# Patient Record
Sex: MALE | Race: BLACK OR AFRICAN AMERICAN | NOT HISPANIC OR LATINO | URBAN - METROPOLITAN AREA
[De-identification: names, ages, dates, MRNs, and addresses within clinical notes are randomized per-mention and may not be internally consistent; named-entity substitution may affect disease eponyms.]

---

## 2017-09-28 ENCOUNTER — OUTPATIENT (OUTPATIENT)
Dept: OUTPATIENT SERVICES | Facility: HOSPITAL | Age: 52
LOS: 1 days | Discharge: HOME | End: 2017-09-28
Payer: COMMERCIAL

## 2017-09-28 VITALS
TEMPERATURE: 98 F | SYSTOLIC BLOOD PRESSURE: 106 MMHG | RESPIRATION RATE: 20 BRPM | OXYGEN SATURATION: 100 % | HEART RATE: 89 BPM | WEIGHT: 233.91 LBS | HEIGHT: 72 IN | DIASTOLIC BLOOD PRESSURE: 61 MMHG

## 2017-09-28 DIAGNOSIS — Z98.890 OTHER SPECIFIED POSTPROCEDURAL STATES: Chronic | ICD-10-CM

## 2017-09-28 DIAGNOSIS — Z90.89 ACQUIRED ABSENCE OF OTHER ORGANS: Chronic | ICD-10-CM

## 2017-09-28 DIAGNOSIS — M25.512 PAIN IN LEFT SHOULDER: ICD-10-CM

## 2017-09-28 LAB
ANION GAP SERPL CALC-SCNC: 7 MMOL/L — SIGNIFICANT CHANGE UP (ref 5–17)
APTT BLD: 32.2 SEC — SIGNIFICANT CHANGE UP (ref 27.5–37.4)
BASOPHILS # BLD AUTO: 0.1 K/UL — SIGNIFICANT CHANGE UP (ref 0–0.2)
BASOPHILS NFR BLD AUTO: 1.2 % — SIGNIFICANT CHANGE UP (ref 0–2)
BUN SERPL-MCNC: 11 MG/DL — SIGNIFICANT CHANGE UP (ref 7–23)
CALCIUM SERPL-MCNC: 8.7 MG/DL — SIGNIFICANT CHANGE UP (ref 8.5–10.1)
CHLORIDE SERPL-SCNC: 106 MMOL/L — SIGNIFICANT CHANGE UP (ref 96–108)
CO2 SERPL-SCNC: 26 MMOL/L — SIGNIFICANT CHANGE UP (ref 22–31)
CREAT SERPL-MCNC: 1.29 MG/DL — SIGNIFICANT CHANGE UP (ref 0.5–1.3)
EOSINOPHIL # BLD AUTO: 0.3 K/UL — SIGNIFICANT CHANGE UP (ref 0–0.5)
EOSINOPHIL NFR BLD AUTO: 3.6 % — SIGNIFICANT CHANGE UP (ref 0–6)
GLUCOSE SERPL-MCNC: 104 MG/DL — HIGH (ref 70–99)
HCT VFR BLD CALC: 41.8 % — SIGNIFICANT CHANGE UP (ref 39–50)
HGB BLD-MCNC: 13.8 G/DL — SIGNIFICANT CHANGE UP (ref 13–17)
INR BLD: 1.09 RATIO — SIGNIFICANT CHANGE UP (ref 0.88–1.16)
LYMPHOCYTES # BLD AUTO: 2 K/UL — SIGNIFICANT CHANGE UP (ref 1–3.3)
LYMPHOCYTES # BLD AUTO: 23.2 % — SIGNIFICANT CHANGE UP (ref 13–44)
MCHC RBC-ENTMCNC: 28.3 PG — SIGNIFICANT CHANGE UP (ref 27–34)
MCHC RBC-ENTMCNC: 33 GM/DL — SIGNIFICANT CHANGE UP (ref 32–36)
MCV RBC AUTO: 85.6 FL — SIGNIFICANT CHANGE UP (ref 80–100)
MONOCYTES # BLD AUTO: 0.8 K/UL — SIGNIFICANT CHANGE UP (ref 0–0.9)
MONOCYTES NFR BLD AUTO: 8.6 % — SIGNIFICANT CHANGE UP (ref 2–14)
NEUTROPHILS # BLD AUTO: 5.6 K/UL — SIGNIFICANT CHANGE UP (ref 1.8–7.4)
NEUTROPHILS NFR BLD AUTO: 63.4 % — SIGNIFICANT CHANGE UP (ref 43–77)
PLATELET # BLD AUTO: 237 K/UL — SIGNIFICANT CHANGE UP (ref 150–400)
POTASSIUM SERPL-MCNC: 4 MMOL/L — SIGNIFICANT CHANGE UP (ref 3.5–5.3)
POTASSIUM SERPL-SCNC: 4 MMOL/L — SIGNIFICANT CHANGE UP (ref 3.5–5.3)
PROTHROM AB SERPL-ACNC: 11.8 SEC — SIGNIFICANT CHANGE UP (ref 9.8–12.7)
RBC # BLD: 4.88 M/UL — SIGNIFICANT CHANGE UP (ref 4.2–5.8)
RBC # FLD: 14.8 % — HIGH (ref 10.3–14.5)
SODIUM SERPL-SCNC: 139 MMOL/L — SIGNIFICANT CHANGE UP (ref 135–145)
WBC # BLD: 8.8 K/UL — SIGNIFICANT CHANGE UP (ref 3.8–10.5)
WBC # FLD AUTO: 8.8 K/UL — SIGNIFICANT CHANGE UP (ref 3.8–10.5)

## 2017-09-28 PROCEDURE — 71020: CPT | Mod: 26

## 2017-09-28 PROCEDURE — 93010 ELECTROCARDIOGRAM REPORT: CPT

## 2017-09-28 NOTE — H&P PST ADULT - PMH
Anxiety    Asthma    Diabetes mellitus    GERD (gastroesophageal reflux disease)    Hiatal hernia    HTN (hypertension)    Intervertebral disc disorder  bulging discs cervical and lumbar region  Low testosterone in male    Osteoarthritis    Reactive airway disease    Rotator cuff tear, left Anxiety    Asthma    Diabetes mellitus    GERD (gastroesophageal reflux disease)    Hiatal hernia    HTN (hypertension)    Intervertebral disc disorder  bulging discs cervical and lumbar region  Low testosterone in male    Osteoarthritis    Reactive airway disease    Shoulder pain, left

## 2017-09-28 NOTE — H&P PST ADULT - ASSESSMENT
53 y/o male with chronic left shoulder pain. Pt tried physical therapy and injection without pain relief. Pt has limited ROM of left shoulder. Scheduled for left shoulder arthroscopy with Dr. Ram.    Plan  1. Stop all NSAIDS, herbal supplements and vitamins for 7 days.  2. NPO at midnight.  3. Take the following medications (norvasc, lisinopril, reglan, omeprazole, escitalopram and use inhalers) with small sips of water on the morning of your procedure/surgery.  4. Use sponges as directed

## 2017-09-28 NOTE — H&P PST ADULT - MUSCULOSKELETAL
No joint pain, swelling or deformity; no limitation of movement details… detailed exam left shoulder/decreased ROM due to pain

## 2017-09-28 NOTE — H&P PST ADULT - PSH
H/O submucous nasal surgery  septoplasty  S/P Achilles tendon repair  right - done 2x  S/P carpal tunnel release  right  S/P laparoscopic fundoplication  2x  S/P tonsillectomy and adenoidectomy

## 2017-09-28 NOTE — H&P PST ADULT - FAMILY HISTORY
Father  Still living? No  Family history of diabetes mellitus, Age at diagnosis: Age Unknown  Family history of prostate cancer, Age at diagnosis: Age Unknown     Mother  Still living? No  Family history of other heart disease, Age at diagnosis: Age Unknown

## 2017-09-28 NOTE — H&P PST ADULT - HISTORY OF PRESENT ILLNESS
53 y/o male with chronic left shoulder pain. Pt tried physical therapy and injection without pain relief. Pt has limited ROM of left shoulder. Here today for PST for left shoulder arthroscopy.

## 2017-10-05 ENCOUNTER — OUTPATIENT (OUTPATIENT)
Dept: OUTPATIENT SERVICES | Facility: HOSPITAL | Age: 52
LOS: 1 days | Discharge: HOME | End: 2017-10-05
Payer: COMMERCIAL

## 2017-10-05 VITALS
TEMPERATURE: 98 F | HEART RATE: 81 BPM | DIASTOLIC BLOOD PRESSURE: 87 MMHG | RESPIRATION RATE: 16 BRPM | OXYGEN SATURATION: 100 % | WEIGHT: 233.69 LBS | HEIGHT: 72 IN | SYSTOLIC BLOOD PRESSURE: 128 MMHG

## 2017-10-05 VITALS
DIASTOLIC BLOOD PRESSURE: 70 MMHG | SYSTOLIC BLOOD PRESSURE: 109 MMHG | TEMPERATURE: 98 F | HEART RATE: 77 BPM | OXYGEN SATURATION: 98 % | RESPIRATION RATE: 16 BRPM

## 2017-10-05 DIAGNOSIS — Z90.89 ACQUIRED ABSENCE OF OTHER ORGANS: Chronic | ICD-10-CM

## 2017-10-05 DIAGNOSIS — Z98.890 OTHER SPECIFIED POSTPROCEDURAL STATES: Chronic | ICD-10-CM

## 2017-10-05 PROCEDURE — 88304 TISSUE EXAM BY PATHOLOGIST: CPT | Mod: 26

## 2017-10-05 RX ORDER — OXYCODONE HYDROCHLORIDE 5 MG/1
5 TABLET ORAL EVERY 4 HOURS
Qty: 0 | Refills: 0 | Status: DISCONTINUED | OUTPATIENT
Start: 2017-10-05 | End: 2017-10-05

## 2017-10-05 RX ORDER — SODIUM CHLORIDE 9 MG/ML
1000 INJECTION INTRAMUSCULAR; INTRAVENOUS; SUBCUTANEOUS
Qty: 0 | Refills: 0 | Status: DISCONTINUED | OUTPATIENT
Start: 2017-10-05 | End: 2017-10-05

## 2017-10-05 RX ORDER — ACETAMINOPHEN 500 MG
1000 TABLET ORAL ONCE
Qty: 0 | Refills: 0 | Status: DISCONTINUED | OUTPATIENT
Start: 2017-10-05 | End: 2017-10-05

## 2017-10-05 RX ORDER — SODIUM CHLORIDE 9 MG/ML
3 INJECTION INTRAMUSCULAR; INTRAVENOUS; SUBCUTANEOUS EVERY 8 HOURS
Qty: 0 | Refills: 0 | Status: DISCONTINUED | OUTPATIENT
Start: 2017-10-05 | End: 2017-10-05

## 2017-10-05 RX ORDER — ONDANSETRON 8 MG/1
4 TABLET, FILM COATED ORAL ONCE
Qty: 0 | Refills: 0 | Status: DISCONTINUED | OUTPATIENT
Start: 2017-10-05 | End: 2017-10-05

## 2017-10-05 RX ORDER — FENTANYL CITRATE 50 UG/ML
25 INJECTION INTRAVENOUS
Qty: 0 | Refills: 0 | Status: DISCONTINUED | OUTPATIENT
Start: 2017-10-05 | End: 2017-10-05

## 2017-10-05 RX ADMIN — SODIUM CHLORIDE 75 MILLILITER(S): 9 INJECTION INTRAMUSCULAR; INTRAVENOUS; SUBCUTANEOUS at 12:32

## 2017-10-05 NOTE — PROGRESS NOTE ADULT - SUBJECTIVE AND OBJECTIVE BOX
__Left____ Interscalene Nerve Block Note:  Time out performed, pt awake and alert, sterile prep with chlorhexidine and drape, ultrasound-guided, 21G 2" echostim needle, good visualization of needle and nerve at all times, 20cc of 0.5% Ropivacaine and 10cc of 2% Lidocaine injected easily, no heme after aspirating every 5cc, no intraneural injection, no paresthesia.  Procedure well tolerated without complications.

## 2017-10-05 NOTE — BRIEF OPERATIVE NOTE - PROCEDURE
<<-----Click on this checkbox to enter Procedure Subacromial decompression  10/05/2017    Active  NFRANE

## 2017-10-05 NOTE — ASU DISCHARGE PLAN (ADULT/PEDIATRIC). - MEDICATION SUMMARY - MEDICATIONS TO TAKE
I will START or STAY ON the medications listed below when I get home from the hospital:    oxyCODONE-acetaminophen 5 mg-325 mg oral tablet  -- 1 tab(s) by mouth every 4 hours, As Needed -for severe pain MDD:6  -- Caution federal law prohibits the transfer of this drug to any person other  than the person for whom it was prescribed.  May cause drowsiness.  Alcohol may intensify this effect.  Use care when operating dangerous machinery.  This prescription cannot be refilled.  This product contains acetaminophen.  Do not use  with any other product containing acetaminophen to prevent possible liver damage.  Using more of this medication than prescribed may cause serious breathing problems.    -- Indication: For Prn pain    lisinopril 5 mg oral tablet  -- 1 tab(s) by mouth once a day  -- Indication: For home med    escitalopram 10 mg oral tablet  -- 1 tab(s) by mouth once a day  -- Indication: For home med    metFORMIN 500 mg oral tablet  -- 3 tab(s) by mouth once a day  -- Indication: For home med    Reglan 10 mg oral tablet  -- 1 tab(s) by mouth once a day, As Needed  -- Indication: For home med    zolpidem 12.5 mg oral tablet, extended release  -- 1 tab(s) by mouth once a day (at bedtime), As Needed  -- Indication: For home med    Advair Diskus 500 mcg-50 mcg inhalation powder  -- 1 puff(s) inhaled 2 times a day  -- Indication: For home med    Spiriva 18 mcg inhalation capsule  -- 1 cap(s) inhaled once a day  -- Indication: For home med    Norvasc 5 mg oral tablet  -- 1 tab(s) by mouth once a day  -- Indication: For home med    Zantac 150 oral tablet  -- 1 tab(s) by mouth once a day (at bedtime), As Needed  -- Indication: For home med    Nasacort 55 mcg/inh nasal aerosol  -- 2 spray(s) into nose once a day  -- Indication: For home med    omeprazole 40 mg oral delayed release capsule  -- 1 cap(s) by mouth once a day  -- Indication: For home med    Testosterone Cypionate 200 mg/mL intramuscular solution  -- 0.5 milliliter(s) intramuscular once a week  -- Indication: For home med    Vitamin D2 50,000 intl units (1.25 mg) oral capsule  -- 1 cap(s) by mouth once a week  -- Indication: For home med

## 2017-10-05 NOTE — ASU PATIENT PROFILE, ADULT - PMH
Anxiety    Asthma    Diabetes mellitus    GERD (gastroesophageal reflux disease)    Hiatal hernia    HTN (hypertension)    Intervertebral disc disorder  bulging discs cervical and lumbar region  Low testosterone in male    Osteoarthritis    Reactive airway disease    Shoulder pain, left

## 2017-10-06 LAB — SURGICAL PATHOLOGY FINAL REPORT - CH: SIGNIFICANT CHANGE UP

## 2017-10-11 DIAGNOSIS — M75.42 IMPINGEMENT SYNDROME OF LEFT SHOULDER: ICD-10-CM

## 2017-10-11 DIAGNOSIS — J43.9 EMPHYSEMA, UNSPECIFIED: ICD-10-CM

## 2017-10-11 DIAGNOSIS — J45.909 UNSPECIFIED ASTHMA, UNCOMPLICATED: ICD-10-CM

## 2017-10-11 DIAGNOSIS — K21.9 GASTRO-ESOPHAGEAL REFLUX DISEASE WITHOUT ESOPHAGITIS: ICD-10-CM

## 2017-10-11 DIAGNOSIS — I10 ESSENTIAL (PRIMARY) HYPERTENSION: ICD-10-CM

## 2017-10-11 DIAGNOSIS — E11.9 TYPE 2 DIABETES MELLITUS WITHOUT COMPLICATIONS: ICD-10-CM

## 2017-10-11 DIAGNOSIS — G47.33 OBSTRUCTIVE SLEEP APNEA (ADULT) (PEDIATRIC): ICD-10-CM

## 2018-04-30 PROBLEM — Z00.00 ENCOUNTER FOR PREVENTIVE HEALTH EXAMINATION: Status: ACTIVE | Noted: 2018-04-30

## 2018-05-09 ENCOUNTER — APPOINTMENT (OUTPATIENT)
Dept: GASTROENTEROLOGY | Facility: CLINIC | Age: 53
End: 2018-05-09
Payer: COMMERCIAL

## 2018-05-09 VITALS
HEIGHT: 72 IN | DIASTOLIC BLOOD PRESSURE: 64 MMHG | OXYGEN SATURATION: 98 % | TEMPERATURE: 96.9 F | RESPIRATION RATE: 15 BRPM | WEIGHT: 232 LBS | HEART RATE: 79 BPM | SYSTOLIC BLOOD PRESSURE: 110 MMHG | BODY MASS INDEX: 31.42 KG/M2

## 2018-05-09 DIAGNOSIS — G47.30 SLEEP APNEA, UNSPECIFIED: ICD-10-CM

## 2018-05-09 DIAGNOSIS — Z80.0 FAMILY HISTORY OF MALIGNANT NEOPLASM OF DIGESTIVE ORGANS: ICD-10-CM

## 2018-05-09 DIAGNOSIS — E11.9 TYPE 2 DIABETES MELLITUS W/OUT COMPLICATIONS: ICD-10-CM

## 2018-05-09 DIAGNOSIS — J45.909 UNSPECIFIED ASTHMA, UNCOMPLICATED: ICD-10-CM

## 2018-05-09 DIAGNOSIS — Z80.42 FAMILY HISTORY OF MALIGNANT NEOPLASM OF PROSTATE: ICD-10-CM

## 2018-05-09 PROCEDURE — 99204 OFFICE O/P NEW MOD 45 MIN: CPT

## 2018-05-16 ENCOUNTER — FORM ENCOUNTER (OUTPATIENT)
Age: 53
End: 2018-05-16

## 2018-05-17 ENCOUNTER — OUTPATIENT (OUTPATIENT)
Dept: OUTPATIENT SERVICES | Facility: HOSPITAL | Age: 53
LOS: 1 days | End: 2018-05-17

## 2018-05-17 ENCOUNTER — APPOINTMENT (OUTPATIENT)
Dept: RADIOLOGY | Facility: HOSPITAL | Age: 53
End: 2018-05-17
Payer: COMMERCIAL

## 2018-05-17 DIAGNOSIS — Z98.890 OTHER SPECIFIED POSTPROCEDURAL STATES: Chronic | ICD-10-CM

## 2018-05-17 DIAGNOSIS — Z90.89 ACQUIRED ABSENCE OF OTHER ORGANS: Chronic | ICD-10-CM

## 2018-05-17 DIAGNOSIS — K21.9 GASTRO-ESOPHAGEAL REFLUX DISEASE WITHOUT ESOPHAGITIS: ICD-10-CM

## 2018-05-17 DIAGNOSIS — R13.12 DYSPHAGIA, OROPHARYNGEAL PHASE: ICD-10-CM

## 2018-05-17 PROCEDURE — 74241: CPT | Mod: 26

## 2018-05-30 ENCOUNTER — APPOINTMENT (OUTPATIENT)
Dept: GASTROENTEROLOGY | Facility: CLINIC | Age: 53
End: 2018-05-30
Payer: COMMERCIAL

## 2018-05-30 ENCOUNTER — APPOINTMENT (OUTPATIENT)
Dept: GASTROENTEROLOGY | Facility: CLINIC | Age: 53
End: 2018-05-30

## 2018-05-30 VITALS
OXYGEN SATURATION: 99 % | BODY MASS INDEX: 30.75 KG/M2 | HEIGHT: 72 IN | HEART RATE: 98 BPM | RESPIRATION RATE: 16 BRPM | WEIGHT: 227 LBS | SYSTOLIC BLOOD PRESSURE: 119 MMHG | DIASTOLIC BLOOD PRESSURE: 78 MMHG

## 2018-05-30 DIAGNOSIS — J32.9 CHRONIC SINUSITIS, UNSPECIFIED: ICD-10-CM

## 2018-05-30 DIAGNOSIS — E55.9 VITAMIN D DEFICIENCY, UNSPECIFIED: ICD-10-CM

## 2018-05-30 DIAGNOSIS — Z82.49 FAMILY HISTORY OF ISCHEMIC HEART DISEASE AND OTHER DISEASES OF THE CIRCULATORY SYSTEM: ICD-10-CM

## 2018-05-30 DIAGNOSIS — Z83.79 FAMILY HISTORY OF OTHER DISEASES OF THE DIGESTIVE SYSTEM: ICD-10-CM

## 2018-05-30 DIAGNOSIS — K57.30 DIVERTICULOSIS OF LARGE INTESTINE W/OUT PERFORATION OR ABSCESS W/OUT BLEEDING: ICD-10-CM

## 2018-05-30 DIAGNOSIS — N52.9 MALE ERECTILE DYSFUNCTION, UNSPECIFIED: ICD-10-CM

## 2018-05-30 DIAGNOSIS — Z57.9 OCCUPATIONAL EXPOSURE TO UNSPECIFIED RISK FACTOR: ICD-10-CM

## 2018-05-30 PROCEDURE — 99214 OFFICE O/P EST MOD 30 MIN: CPT

## 2018-05-30 RX ORDER — TIOTROPIUM BROMIDE 18 UG/1
18 CAPSULE ORAL; RESPIRATORY (INHALATION) DAILY
Refills: 0 | Status: ACTIVE | COMMUNITY

## 2018-05-30 RX ORDER — RANITIDINE 150 MG/1
150 TABLET ORAL
Refills: 0 | Status: COMPLETED | COMMUNITY
Start: 2010-05-30 | End: 2018-02-28

## 2018-05-30 RX ORDER — ALBUTEROL 90 MCG
90 AEROSOL (GRAM) INHALATION
Refills: 0 | Status: ACTIVE | COMMUNITY

## 2018-05-30 RX ORDER — CHLORPROMAZINE HCL 100 MG
100 TABLET ORAL
Refills: 0 | Status: DISCONTINUED | COMMUNITY
End: 2018-05-30

## 2018-05-30 RX ORDER — CELECOXIB 100 MG/1
100 CAPSULE ORAL
Refills: 0 | Status: DISCONTINUED | COMMUNITY
End: 2018-05-30

## 2018-05-30 RX ORDER — PROMETHAZINE HYDROCHLORIDE 25 MG/1
25 SUPPOSITORY RECTAL
Refills: 0 | Status: DISCONTINUED | COMMUNITY
End: 2018-05-30

## 2018-05-30 RX ORDER — OMEPRAZOLE 40 MG/1
40 CAPSULE, DELAYED RELEASE ORAL
Refills: 0 | Status: DISCONTINUED | COMMUNITY
End: 2018-05-30

## 2018-05-30 RX ORDER — FLUTICASONE PROPIONATE AND SALMETEROL 50; 500 UG/1; UG/1
500-50 POWDER RESPIRATORY (INHALATION) TWICE DAILY
Refills: 0 | Status: ACTIVE | COMMUNITY

## 2018-05-30 SDOH — HEALTH STABILITY - PHYSICAL HEALTH: OCCUPATIONAL EXPOSURE TO UNSPECIFIED RISK FACTOR: Z57.9

## 2018-06-13 ENCOUNTER — APPOINTMENT (OUTPATIENT)
Age: 53
End: 2018-06-13

## 2018-06-13 ENCOUNTER — APPOINTMENT (OUTPATIENT)
Dept: RADIOLOGY | Facility: HOSPITAL | Age: 53
End: 2018-06-13

## 2018-06-13 ENCOUNTER — APPOINTMENT (OUTPATIENT)
Dept: SPEECH THERAPY | Facility: HOSPITAL | Age: 53
End: 2018-06-13

## 2018-06-13 PROBLEM — Z00.00 ENCOUNTER FOR PREVENTIVE HEALTH EXAMINATION: Noted: 2018-06-13

## 2018-06-15 ENCOUNTER — OUTPATIENT (OUTPATIENT)
Dept: OUTPATIENT SERVICES | Facility: HOSPITAL | Age: 53
LOS: 1 days | End: 2018-06-15
Payer: COMMERCIAL

## 2018-06-15 ENCOUNTER — APPOINTMENT (OUTPATIENT)
Dept: GASTROENTEROLOGY | Facility: HOSPITAL | Age: 53
End: 2018-06-15

## 2018-06-15 ENCOUNTER — RESULT REVIEW (OUTPATIENT)
Age: 53
End: 2018-06-15

## 2018-06-15 DIAGNOSIS — Z98.890 OTHER SPECIFIED POSTPROCEDURAL STATES: Chronic | ICD-10-CM

## 2018-06-15 DIAGNOSIS — Z90.89 ACQUIRED ABSENCE OF OTHER ORGANS: Chronic | ICD-10-CM

## 2018-06-15 DIAGNOSIS — K21.9 GASTRO-ESOPHAGEAL REFLUX DISEASE WITHOUT ESOPHAGITIS: ICD-10-CM

## 2018-06-15 PROBLEM — Z57.9 OCCUPATIONAL EXPOSURE IN WORKPLACE: Status: ACTIVE | Noted: 2018-05-30

## 2018-06-15 LAB — GLUCOSE BLDC GLUCOMTR-MCNC: 99 MG/DL — SIGNIFICANT CHANGE UP (ref 70–99)

## 2018-06-15 PROCEDURE — 43239 EGD BIOPSY SINGLE/MULTIPLE: CPT

## 2018-06-15 PROCEDURE — 82962 GLUCOSE BLOOD TEST: CPT

## 2018-06-15 PROCEDURE — 91035 G-ESOPH REFLX TST W/ELECTROD: CPT

## 2018-06-15 PROCEDURE — C1889: CPT

## 2018-06-15 RX ORDER — AZITHROMYCIN 250 MG/1
250 TABLET, FILM COATED ORAL
Qty: 6 | Refills: 0 | Status: DISCONTINUED | COMMUNITY
Start: 2018-02-26 | End: 2018-06-15

## 2018-06-15 RX ORDER — CYCLOBENZAPRINE HYDROCHLORIDE 7.5 MG/1
7.5 TABLET, FILM COATED ORAL
Qty: 90 | Refills: 0 | Status: DISCONTINUED | COMMUNITY
Start: 2017-11-17 | End: 2018-06-15

## 2018-06-15 RX ORDER — FLUCONAZOLE 100 MG/1
100 TABLET ORAL DAILY
Qty: 10 | Refills: 0 | Status: COMPLETED | COMMUNITY
Start: 2018-06-15 | End: 2018-06-25

## 2018-06-15 RX ORDER — LIDOCAINE 5 G/100G
5 OINTMENT TOPICAL
Qty: 300 | Refills: 0 | Status: DISCONTINUED | COMMUNITY
Start: 2017-11-17 | End: 2018-06-15

## 2018-06-15 RX ORDER — MIRTAZAPINE 15 MG/1
15 TABLET, FILM COATED ORAL
Qty: 30 | Refills: 0 | Status: DISCONTINUED | COMMUNITY
Start: 2017-11-28 | End: 2018-06-15

## 2018-06-18 PROCEDURE — 91035 G-ESOPH REFLX TST W/ELECTROD: CPT | Mod: 26

## 2018-07-10 ENCOUNTER — APPOINTMENT (OUTPATIENT)
Dept: GASTROENTEROLOGY | Facility: HOSPITAL | Age: 53
End: 2018-07-10
Payer: COMMERCIAL

## 2018-07-10 ENCOUNTER — LABORATORY RESULT (OUTPATIENT)
Age: 53
End: 2018-07-10

## 2018-07-10 ENCOUNTER — OUTPATIENT (OUTPATIENT)
Dept: OUTPATIENT SERVICES | Facility: HOSPITAL | Age: 53
LOS: 1 days | Discharge: HOME | End: 2018-07-10
Payer: COMMERCIAL

## 2018-07-10 DIAGNOSIS — Z98.890 OTHER SPECIFIED POSTPROCEDURAL STATES: Chronic | ICD-10-CM

## 2018-07-10 DIAGNOSIS — K31.84 GASTROPARESIS: ICD-10-CM

## 2018-07-10 DIAGNOSIS — Z90.89 ACQUIRED ABSENCE OF OTHER ORGANS: Chronic | ICD-10-CM

## 2018-07-10 DIAGNOSIS — R13.12 DYSPHAGIA, OROPHARYNGEAL PHASE: ICD-10-CM

## 2018-07-10 PROCEDURE — 43239 EGD BIOPSY SINGLE/MULTIPLE: CPT

## 2018-07-10 PROCEDURE — 91010 ESOPHAGUS MOTILITY STUDY: CPT | Mod: 26,53

## 2018-07-10 RX ORDER — ONABOTULINUMTOXINA 100 [USP'U]/1
100 INJECTION, POWDER, LYOPHILIZED, FOR SOLUTION INTRADERMAL; INTRAMUSCULAR
Qty: 1 | Refills: 0 | Status: COMPLETED | COMMUNITY
Start: 2018-05-11 | End: 2018-06-15

## 2018-07-10 RX ORDER — DULAGLUTIDE 1.5 MG/.5ML
1.5 INJECTION, SOLUTION SUBCUTANEOUS
Qty: 6 | Refills: 0 | Status: DISCONTINUED | COMMUNITY
Start: 2018-04-02 | End: 2018-06-15

## 2018-07-10 RX ORDER — TESTOSTERONE CYPIONATE 100 MG/ML
100 INJECTION, SOLUTION INTRAMUSCULAR WEEKLY
Refills: 0 | Status: DISCONTINUED | COMMUNITY
Start: 2018-05-30 | End: 2018-06-10

## 2018-07-11 ENCOUNTER — APPOINTMENT (OUTPATIENT)
Dept: GASTROENTEROLOGY | Facility: CLINIC | Age: 53
End: 2018-07-11
Payer: COMMERCIAL

## 2018-07-11 DIAGNOSIS — K20.9 ESOPHAGITIS, UNSPECIFIED: ICD-10-CM

## 2018-07-11 DIAGNOSIS — K31.83: ICD-10-CM

## 2018-07-11 DIAGNOSIS — T18.2XXA FOREIGN BODY IN STOMACH, INITIAL ENCOUNTER: ICD-10-CM

## 2018-07-11 DIAGNOSIS — K29.40 CHRONIC ATROPHIC GASTRITIS W/OUT BLEEDING: ICD-10-CM

## 2018-07-11 PROCEDURE — 99214 OFFICE O/P EST MOD 30 MIN: CPT

## 2018-07-13 ENCOUNTER — RESULT REVIEW (OUTPATIENT)
Age: 53
End: 2018-07-13

## 2018-07-16 PROBLEM — E29.1 TESTICULAR HYPOFUNCTION: Chronic | Status: ACTIVE | Noted: 2017-09-28

## 2018-07-16 PROBLEM — M19.90 UNSPECIFIED OSTEOARTHRITIS, UNSPECIFIED SITE: Chronic | Status: ACTIVE | Noted: 2017-09-28

## 2018-07-16 PROBLEM — I10 ESSENTIAL (PRIMARY) HYPERTENSION: Chronic | Status: ACTIVE | Noted: 2017-09-28

## 2018-07-16 PROBLEM — J45.909 UNSPECIFIED ASTHMA, UNCOMPLICATED: Chronic | Status: ACTIVE | Noted: 2017-09-28

## 2018-07-16 PROBLEM — M51.9 UNSPECIFIED THORACIC, THORACOLUMBAR AND LUMBOSACRAL INTERVERTEBRAL DISC DISORDER: Chronic | Status: ACTIVE | Noted: 2017-09-28

## 2018-07-16 PROBLEM — K21.9 GASTRO-ESOPHAGEAL REFLUX DISEASE WITHOUT ESOPHAGITIS: Chronic | Status: ACTIVE | Noted: 2017-09-28

## 2018-07-16 PROBLEM — E11.9 TYPE 2 DIABETES MELLITUS WITHOUT COMPLICATIONS: Chronic | Status: ACTIVE | Noted: 2017-09-28

## 2018-07-16 PROBLEM — M25.512 PAIN IN LEFT SHOULDER: Chronic | Status: ACTIVE | Noted: 2017-09-28

## 2018-07-16 PROBLEM — K44.9 DIAPHRAGMATIC HERNIA WITHOUT OBSTRUCTION OR GANGRENE: Chronic | Status: ACTIVE | Noted: 2017-09-28

## 2018-07-18 DIAGNOSIS — K31.83 ACHLORHYDRIA: ICD-10-CM

## 2018-07-23 ENCOUNTER — FORM ENCOUNTER (OUTPATIENT)
Age: 53
End: 2018-07-23

## 2018-07-24 ENCOUNTER — OUTPATIENT (OUTPATIENT)
Dept: OUTPATIENT SERVICES | Facility: HOSPITAL | Age: 53
LOS: 1 days | End: 2018-07-24
Payer: COMMERCIAL

## 2018-07-24 ENCOUNTER — APPOINTMENT (OUTPATIENT)
Dept: GASTROENTEROLOGY | Facility: HOSPITAL | Age: 53
End: 2018-07-24
Payer: COMMERCIAL

## 2018-07-24 DIAGNOSIS — R51 HEADACHE: ICD-10-CM

## 2018-07-24 DIAGNOSIS — K29.40 CHRONIC ATROPHIC GASTRITIS WITHOUT BLEEDING: ICD-10-CM

## 2018-07-24 DIAGNOSIS — Z90.89 ACQUIRED ABSENCE OF OTHER ORGANS: Chronic | ICD-10-CM

## 2018-07-24 DIAGNOSIS — Z98.890 OTHER SPECIFIED POSTPROCEDURAL STATES: Chronic | ICD-10-CM

## 2018-07-24 DIAGNOSIS — K31.83 ACHLORHYDRIA: ICD-10-CM

## 2018-07-24 PROCEDURE — 71046 X-RAY EXAM CHEST 2 VIEWS: CPT

## 2018-07-24 PROCEDURE — 71046 X-RAY EXAM CHEST 2 VIEWS: CPT | Mod: 26

## 2018-07-24 PROCEDURE — 91034 GASTROESOPHAGEAL REFLUX TEST: CPT

## 2018-07-24 PROCEDURE — 91038 ESOPH IMPED FUNCT TEST > 1HR: CPT | Mod: 26

## 2018-07-24 RX ORDER — METFORMIN ER 500 MG 500 MG/1
500 TABLET ORAL
Qty: 270 | Refills: 0 | Status: DISCONTINUED | COMMUNITY
Start: 2017-06-30 | End: 2018-07-24

## 2018-07-25 RX ORDER — OXYCODONE AND ACETAMINOPHEN 7.5; 325 MG/1; MG/1
7.5-325 TABLET ORAL
Qty: 3 | Refills: 0 | Status: COMPLETED | COMMUNITY
Start: 2018-07-24 | End: 2018-07-26

## 2018-08-20 ENCOUNTER — APPOINTMENT (OUTPATIENT)
Dept: GASTROENTEROLOGY | Facility: CLINIC | Age: 53
End: 2018-08-20
Payer: COMMERCIAL

## 2018-08-20 PROCEDURE — 91112 GI WIRELESS CAPSULE MEASURE: CPT

## 2018-09-04 ENCOUNTER — APPOINTMENT (OUTPATIENT)
Dept: GASTROENTEROLOGY | Facility: CLINIC | Age: 53
End: 2018-09-04

## 2018-09-07 ENCOUNTER — APPOINTMENT (OUTPATIENT)
Dept: GASTROENTEROLOGY | Facility: CLINIC | Age: 53
End: 2018-09-07
Payer: COMMERCIAL

## 2018-09-07 VITALS
WEIGHT: 210 LBS | TEMPERATURE: 98.1 F | SYSTOLIC BLOOD PRESSURE: 133 MMHG | OXYGEN SATURATION: 99 % | RESPIRATION RATE: 16 BRPM | HEIGHT: 72 IN | DIASTOLIC BLOOD PRESSURE: 89 MMHG | HEART RATE: 87 BPM | BODY MASS INDEX: 28.44 KG/M2

## 2018-09-07 PROCEDURE — 99214 OFFICE O/P EST MOD 30 MIN: CPT

## 2018-09-07 RX ORDER — NEEDLES, DISPOSABLE 25GX5/8"
18G X 1" NEEDLE, DISPOSABLE MISCELLANEOUS
Qty: 1 | Refills: 0 | Status: DISCONTINUED | COMMUNITY
Start: 2018-04-02 | End: 2018-09-07

## 2018-09-07 RX ORDER — SYRINGE WITH NEEDLE, 1 ML 25GX5/8"
25G X 5/8" SYRINGE, EMPTY DISPOSABLE MISCELLANEOUS
Qty: 5 | Refills: 2 | Status: DISCONTINUED | COMMUNITY
Start: 2018-07-13 | End: 2018-09-07

## 2018-09-07 RX ORDER — TADALAFIL 10 MG/1
10 TABLET, FILM COATED ORAL
Refills: 0 | Status: DISCONTINUED | COMMUNITY
End: 2018-09-07

## 2018-09-07 RX ORDER — MUPIROCIN 20 MG/G
2 OINTMENT TOPICAL
Refills: 0 | Status: DISCONTINUED | COMMUNITY
End: 2018-09-07

## 2018-09-07 RX ORDER — CLOTRIMAZOLE 10 MG/1
10 LOZENGE ORAL 3 TIMES DAILY
Qty: 63 | Refills: 1 | Status: COMPLETED | COMMUNITY
Start: 2018-06-15 | End: 2018-09-07

## 2018-09-07 RX ORDER — METOCLOPRAMIDE 5 MG/1
5 TABLET ORAL 3 TIMES DAILY
Qty: 90 | Refills: 1 | Status: DISCONTINUED | COMMUNITY
Start: 2018-07-12 | End: 2018-09-07

## 2018-09-07 RX ORDER — OMEPRAZOLE 40 MG/1
40 CAPSULE, DELAYED RELEASE ORAL
Refills: 0 | Status: DISCONTINUED | COMMUNITY
End: 2018-09-07

## 2018-09-07 RX ORDER — TESTOSTERONE CYPIONATE 200 MG/ML
200 INJECTION, SOLUTION INTRAMUSCULAR
Qty: 6 | Refills: 0 | Status: DISCONTINUED | COMMUNITY
Start: 2018-02-07 | End: 2018-09-07

## 2018-09-07 RX ORDER — SYRINGE W-NEEDLE,DISPOSAB,3 ML 25GX1"
25G X 1" SYRINGE, EMPTY DISPOSABLE MISCELLANEOUS
Qty: 12 | Refills: 0 | Status: DISCONTINUED | COMMUNITY
Start: 2018-08-20 | End: 2018-09-07

## 2018-09-07 RX ORDER — SYRINGE WITH NEEDLE, 1 ML 25GX5/8"
23G X 1" SYRINGE, EMPTY DISPOSABLE MISCELLANEOUS
Qty: 4 | Refills: 0 | Status: DISCONTINUED | COMMUNITY
Start: 2017-06-30 | End: 2018-09-07

## 2018-09-07 RX ORDER — CHOLECALCIFEROL (VITAMIN D3) 1250 MCG
1.25 MG CAPSULE ORAL
Qty: 12 | Refills: 0 | Status: DISCONTINUED | COMMUNITY
Start: 2018-02-26 | End: 2018-09-07

## 2018-09-20 ENCOUNTER — APPOINTMENT (OUTPATIENT)
Dept: SURGERY | Facility: CLINIC | Age: 53
End: 2018-09-20
Payer: COMMERCIAL

## 2018-09-20 VITALS
WEIGHT: 215 LBS | SYSTOLIC BLOOD PRESSURE: 130 MMHG | DIASTOLIC BLOOD PRESSURE: 86 MMHG | BODY MASS INDEX: 29.12 KG/M2 | HEART RATE: 101 BPM | OXYGEN SATURATION: 97 % | HEIGHT: 72 IN | TEMPERATURE: 98 F

## 2018-09-20 PROCEDURE — 99242 OFF/OP CONSLTJ NEW/EST SF 20: CPT

## 2018-09-24 ENCOUNTER — OUTPATIENT (OUTPATIENT)
Dept: OUTPATIENT SERVICES | Facility: HOSPITAL | Age: 53
LOS: 1 days | End: 2018-09-24
Payer: COMMERCIAL

## 2018-09-24 DIAGNOSIS — Z98.890 OTHER SPECIFIED POSTPROCEDURAL STATES: Chronic | ICD-10-CM

## 2018-09-24 DIAGNOSIS — Z90.89 ACQUIRED ABSENCE OF OTHER ORGANS: Chronic | ICD-10-CM

## 2018-09-24 DIAGNOSIS — Z00.00 ENCOUNTER FOR GENERAL ADULT MEDICAL EXAMINATION WITHOUT ABNORMAL FINDINGS: ICD-10-CM

## 2018-09-24 LAB — GLUCOSE BLDC GLUCOMTR-MCNC: 99 MG/DL — SIGNIFICANT CHANGE UP (ref 70–99)

## 2018-09-24 PROCEDURE — 78264 GASTRIC EMPTYING IMG STUDY: CPT

## 2018-09-24 PROCEDURE — 78264 GASTRIC EMPTYING IMG STUDY: CPT | Mod: 26,GC

## 2018-09-24 PROCEDURE — A9541: CPT

## 2018-09-24 PROCEDURE — 82962 GLUCOSE BLOOD TEST: CPT

## 2018-09-27 ENCOUNTER — APPOINTMENT (OUTPATIENT)
Dept: SURGERY | Facility: CLINIC | Age: 53
End: 2018-09-27
Payer: COMMERCIAL

## 2018-09-27 VITALS
BODY MASS INDEX: 29.12 KG/M2 | WEIGHT: 215 LBS | OXYGEN SATURATION: 99 % | HEIGHT: 72 IN | HEART RATE: 104 BPM | DIASTOLIC BLOOD PRESSURE: 77 MMHG | SYSTOLIC BLOOD PRESSURE: 122 MMHG | RESPIRATION RATE: 16 BRPM

## 2018-09-27 PROCEDURE — 99214 OFFICE O/P EST MOD 30 MIN: CPT

## 2018-10-10 ENCOUNTER — OUTPATIENT (OUTPATIENT)
Dept: OUTPATIENT SERVICES | Facility: HOSPITAL | Age: 53
LOS: 1 days | End: 2018-10-10
Payer: COMMERCIAL

## 2018-10-10 VITALS
WEIGHT: 218.04 LBS | TEMPERATURE: 98 F | OXYGEN SATURATION: 99 % | DIASTOLIC BLOOD PRESSURE: 57 MMHG | SYSTOLIC BLOOD PRESSURE: 98 MMHG | RESPIRATION RATE: 16 BRPM | HEART RATE: 107 BPM | HEIGHT: 72 IN

## 2018-10-10 DIAGNOSIS — I10 ESSENTIAL (PRIMARY) HYPERTENSION: ICD-10-CM

## 2018-10-10 DIAGNOSIS — E11.43 TYPE 2 DIABETES MELLITUS WITH DIABETIC AUTONOMIC (POLY)NEUROPATHY: ICD-10-CM

## 2018-10-10 DIAGNOSIS — Z98.890 OTHER SPECIFIED POSTPROCEDURAL STATES: Chronic | ICD-10-CM

## 2018-10-10 DIAGNOSIS — Z01.818 ENCOUNTER FOR OTHER PREPROCEDURAL EXAMINATION: ICD-10-CM

## 2018-10-10 DIAGNOSIS — Z90.89 ACQUIRED ABSENCE OF OTHER ORGANS: Chronic | ICD-10-CM

## 2018-10-10 DIAGNOSIS — E11.9 TYPE 2 DIABETES MELLITUS WITHOUT COMPLICATIONS: ICD-10-CM

## 2018-10-10 DIAGNOSIS — Z87.19 PERSONAL HISTORY OF OTHER DISEASES OF THE DIGESTIVE SYSTEM: Chronic | ICD-10-CM

## 2018-10-10 DIAGNOSIS — K31.84 GASTROPARESIS: ICD-10-CM

## 2018-10-10 DIAGNOSIS — G47.00 INSOMNIA, UNSPECIFIED: ICD-10-CM

## 2018-10-10 DIAGNOSIS — F32.9 MAJOR DEPRESSIVE DISORDER, SINGLE EPISODE, UNSPECIFIED: ICD-10-CM

## 2018-10-10 DIAGNOSIS — I26.99 OTHER PULMONARY EMBOLISM WITHOUT ACUTE COR PULMONALE: ICD-10-CM

## 2018-10-10 DIAGNOSIS — J45.909 UNSPECIFIED ASTHMA, UNCOMPLICATED: ICD-10-CM

## 2018-10-10 LAB
ALBUMIN SERPL ELPH-MCNC: 3.9 G/DL — SIGNIFICANT CHANGE UP (ref 3.3–5)
ALP SERPL-CCNC: 82 U/L — SIGNIFICANT CHANGE UP (ref 40–120)
ALT FLD-CCNC: 75 U/L — SIGNIFICANT CHANGE UP (ref 12–78)
ANION GAP SERPL CALC-SCNC: 7 MMOL/L — SIGNIFICANT CHANGE UP (ref 5–17)
AST SERPL-CCNC: 45 U/L — HIGH (ref 15–37)
BILIRUB SERPL-MCNC: 0.5 MG/DL — SIGNIFICANT CHANGE UP (ref 0.2–1.2)
BUN SERPL-MCNC: 7 MG/DL — SIGNIFICANT CHANGE UP (ref 7–23)
CALCIUM SERPL-MCNC: 8.7 MG/DL — SIGNIFICANT CHANGE UP (ref 8.5–10.1)
CHLORIDE SERPL-SCNC: 105 MMOL/L — SIGNIFICANT CHANGE UP (ref 96–108)
CO2 SERPL-SCNC: 30 MMOL/L — SIGNIFICANT CHANGE UP (ref 22–31)
CREAT SERPL-MCNC: 1.2 MG/DL — SIGNIFICANT CHANGE UP (ref 0.5–1.3)
GLUCOSE SERPL-MCNC: 92 MG/DL — SIGNIFICANT CHANGE UP (ref 70–99)
HBA1C BLD-MCNC: 5.8 % — HIGH (ref 4–5.6)
HCT VFR BLD CALC: 42.8 % — SIGNIFICANT CHANGE UP (ref 39–50)
HGB BLD-MCNC: 14.5 G/DL — SIGNIFICANT CHANGE UP (ref 13–17)
MCHC RBC-ENTMCNC: 28.9 PG — SIGNIFICANT CHANGE UP (ref 27–34)
MCHC RBC-ENTMCNC: 33.9 GM/DL — SIGNIFICANT CHANGE UP (ref 32–36)
MCV RBC AUTO: 85.3 FL — SIGNIFICANT CHANGE UP (ref 80–100)
NRBC # BLD: 0 /100 WBCS — SIGNIFICANT CHANGE UP (ref 0–0)
PLATELET # BLD AUTO: 233 K/UL — SIGNIFICANT CHANGE UP (ref 150–400)
POTASSIUM SERPL-MCNC: 3.8 MMOL/L — SIGNIFICANT CHANGE UP (ref 3.5–5.3)
POTASSIUM SERPL-SCNC: 3.8 MMOL/L — SIGNIFICANT CHANGE UP (ref 3.5–5.3)
PROT SERPL-MCNC: 8.1 G/DL — SIGNIFICANT CHANGE UP (ref 6–8.3)
RBC # BLD: 5.02 M/UL — SIGNIFICANT CHANGE UP (ref 4.2–5.8)
RBC # FLD: 17.5 % — HIGH (ref 10.3–14.5)
SODIUM SERPL-SCNC: 142 MMOL/L — SIGNIFICANT CHANGE UP (ref 135–145)
WBC # BLD: 6.76 K/UL — SIGNIFICANT CHANGE UP (ref 3.8–10.5)
WBC # FLD AUTO: 6.76 K/UL — SIGNIFICANT CHANGE UP (ref 3.8–10.5)

## 2018-10-10 PROCEDURE — 83036 HEMOGLOBIN GLYCOSYLATED A1C: CPT

## 2018-10-10 PROCEDURE — 80053 COMPREHEN METABOLIC PANEL: CPT

## 2018-10-10 PROCEDURE — 93010 ELECTROCARDIOGRAM REPORT: CPT | Mod: NC

## 2018-10-10 PROCEDURE — 85027 COMPLETE CBC AUTOMATED: CPT

## 2018-10-10 PROCEDURE — G0463: CPT

## 2018-10-10 PROCEDURE — 93005 ELECTROCARDIOGRAM TRACING: CPT

## 2018-10-10 PROCEDURE — 36415 COLL VENOUS BLD VENIPUNCTURE: CPT

## 2018-10-10 RX ORDER — ERGOCALCIFEROL 1.25 MG/1
1 CAPSULE ORAL
Qty: 0 | Refills: 0 | COMMUNITY

## 2018-10-10 RX ORDER — OMEPRAZOLE 10 MG/1
1 CAPSULE, DELAYED RELEASE ORAL
Qty: 0 | Refills: 0 | COMMUNITY

## 2018-10-10 RX ORDER — RANITIDINE HYDROCHLORIDE 150 MG/1
1 TABLET, FILM COATED ORAL
Qty: 0 | Refills: 0 | COMMUNITY

## 2018-10-10 RX ORDER — METOCLOPRAMIDE HCL 10 MG
1 TABLET ORAL
Qty: 0 | Refills: 0 | COMMUNITY

## 2018-10-10 RX ORDER — METFORMIN HYDROCHLORIDE 850 MG/1
3 TABLET ORAL
Qty: 0 | Refills: 0 | COMMUNITY

## 2018-10-10 NOTE — H&P PST ADULT - NEGATIVE NEUROLOGICAL SYMPTOMS
no loss of consciousness/no generalized seizures/no focal seizures/no headache/no confusion/no tremors/no loss of sensation/no facial palsy/no paresthesias/no vertigo/no transient paralysis/no weakness/no syncope/no difficulty walking

## 2018-10-10 NOTE — H&P PST ADULT - RS GEN PE MLT RESP DETAILS PC
no wheezes/good air movement/respirations non-labored/airway patent/breath sounds equal/clear to auscultation bilaterally

## 2018-10-10 NOTE — H&P PST ADULT - PROBLEM SELECTOR PLAN 3
Pt to continue Metformin as ordered, last dose to be taken in am on 10/21/18, no pm doses, aware to hold on 10/22.

## 2018-10-10 NOTE — H&P PST ADULT - NEGATIVE CARDIOVASCULAR SYMPTOMS
no palpitations/no orthopnea/no paroxysmal nocturnal dyspnea/no chest pain/no peripheral edema/no dyspnea on exertion/no claudication

## 2018-10-10 NOTE — H&P PST ADULT - NEGATIVE MUSCULOSKELETAL SYMPTOMS
no muscle weakness/no back pain/no stiffness/no arm pain L/no arm pain R/no leg pain R/no leg pain L/no muscle cramps/no neck pain/no arthritis/no joint swelling/no myalgia

## 2018-10-10 NOTE — H&P PST ADULT - ASSESSMENT
DX: Type 2 DM with diabetic autonomic (poly) neuropathy, gastroparesis and evaluated for a scheduled placement of gastric pacemaker and upper endoscopy on 10/22/1

## 2018-10-10 NOTE — H&P PST ADULT - HISTORY OF PRESENT ILLNESS
54 y/o male w/ PMH of PE (7/2018, on Eliquis), DM, Asthma, HTN. Presents to PST with a preop dx of Type 2 DM with diabetic autonomic (poly) neuropathy, gastroparesis and to be evaluated for a scheduled placement of gastric pacemaker and upper endoscopy on 10/22/18. Pt states "the nerve in my stomach isn't functioning well, my emptying ability isn't good" this problem has been present for several years, pt is S/P fundoplication x 2, hiatal hernia repair as well. Pt now referred to Dr Herrera for gastric pacemaker for which is now scheduled.

## 2018-10-10 NOTE — H&P PST ADULT - PROBLEM SELECTOR PLAN 1
Pt is scheduled for a placement of gastric pacemaker and upper endoscopy on 10/22/18. Preop instructions provided including NPO status, Hibiclens. Verbalized understanding.   Pt states has PCP and new cardiologist appt next week. Forms provided for clearances.

## 2018-10-10 NOTE — H&P PST ADULT - NEGATIVE PSYCHIATRIC SYMPTOMS
no paranoia/no mood swings/no anxiety/no suicidal ideation/no memory loss/no auditory hallucinations/no agitation/no visual hallucinations

## 2018-10-10 NOTE — H&P PST ADULT - PMH
Anxiety    Asthma    Depression    Diabetes mellitus    Gastroparesis    GERD (gastroesophageal reflux disease)    Hiatal hernia    HTN (hypertension)    Insomnia    Intervertebral disc disorder  bulging discs cervical and lumbar region  Low testosterone in male    Osteoarthritis    PE (pulmonary thromboembolism)    Reactive airway disease    Shoulder pain, left

## 2018-10-15 PROBLEM — K31.84 GASTROPARESIS: Chronic | Status: ACTIVE | Noted: 2018-10-10

## 2018-10-15 PROBLEM — I26.99 OTHER PULMONARY EMBOLISM WITHOUT ACUTE COR PULMONALE: Chronic | Status: ACTIVE | Noted: 2018-10-10

## 2018-10-15 PROBLEM — G47.00 INSOMNIA, UNSPECIFIED: Chronic | Status: ACTIVE | Noted: 2018-10-10

## 2018-10-15 PROBLEM — F32.9 MAJOR DEPRESSIVE DISORDER, SINGLE EPISODE, UNSPECIFIED: Chronic | Status: ACTIVE | Noted: 2018-10-10

## 2018-10-19 RX ORDER — SODIUM CHLORIDE 9 MG/ML
1000 INJECTION, SOLUTION INTRAVENOUS
Qty: 0 | Refills: 0 | Status: DISCONTINUED | OUTPATIENT
Start: 2018-10-22 | End: 2018-10-22

## 2018-10-19 RX ORDER — DEXTROSE 50 % IN WATER 50 %
12.5 SYRINGE (ML) INTRAVENOUS ONCE
Qty: 0 | Refills: 0 | Status: DISCONTINUED | OUTPATIENT
Start: 2018-10-22 | End: 2018-11-06

## 2018-10-19 RX ORDER — DEXTROSE 50 % IN WATER 50 %
25 SYRINGE (ML) INTRAVENOUS ONCE
Qty: 0 | Refills: 0 | Status: DISCONTINUED | OUTPATIENT
Start: 2018-10-22 | End: 2018-11-06

## 2018-10-21 ENCOUNTER — TRANSCRIPTION ENCOUNTER (OUTPATIENT)
Age: 53
End: 2018-10-21

## 2018-10-22 ENCOUNTER — OUTPATIENT (OUTPATIENT)
Dept: OUTPATIENT SERVICES | Facility: HOSPITAL | Age: 53
LOS: 1 days | End: 2018-10-22
Payer: COMMERCIAL

## 2018-10-22 ENCOUNTER — APPOINTMENT (OUTPATIENT)
Dept: SURGERY | Facility: HOSPITAL | Age: 53
End: 2018-10-22

## 2018-10-22 VITALS
RESPIRATION RATE: 17 BRPM | HEART RATE: 82 BPM | DIASTOLIC BLOOD PRESSURE: 80 MMHG | WEIGHT: 220.02 LBS | SYSTOLIC BLOOD PRESSURE: 121 MMHG | TEMPERATURE: 98 F | HEIGHT: 72 IN | OXYGEN SATURATION: 100 %

## 2018-10-22 VITALS
OXYGEN SATURATION: 96 % | TEMPERATURE: 98 F | SYSTOLIC BLOOD PRESSURE: 142 MMHG | RESPIRATION RATE: 15 BRPM | DIASTOLIC BLOOD PRESSURE: 89 MMHG | HEART RATE: 85 BPM

## 2018-10-22 DIAGNOSIS — Z98.890 OTHER SPECIFIED POSTPROCEDURAL STATES: Chronic | ICD-10-CM

## 2018-10-22 DIAGNOSIS — E11.43 TYPE 2 DIABETES MELLITUS WITH DIABETIC AUTONOMIC (POLY)NEUROPATHY: ICD-10-CM

## 2018-10-22 DIAGNOSIS — Z90.89 ACQUIRED ABSENCE OF OTHER ORGANS: Chronic | ICD-10-CM

## 2018-10-22 DIAGNOSIS — Z87.19 PERSONAL HISTORY OF OTHER DISEASES OF THE DIGESTIVE SYSTEM: Chronic | ICD-10-CM

## 2018-10-22 DIAGNOSIS — K31.84 GASTROPARESIS: ICD-10-CM

## 2018-10-22 PROCEDURE — 43647 LAP IMPL ELECTRODE ANTRUM: CPT

## 2018-10-22 PROCEDURE — 64590 INS/RPL PRPH SAC/GSTR NPG/R: CPT

## 2018-10-22 PROCEDURE — 43647 LAP IMPL ELECTRODE ANTRUM: CPT | Mod: AS

## 2018-10-22 PROCEDURE — C1778: CPT

## 2018-10-22 PROCEDURE — 82962 GLUCOSE BLOOD TEST: CPT

## 2018-10-22 PROCEDURE — C1767: CPT

## 2018-10-22 RX ORDER — CEFAZOLIN SODIUM 1 G
2000 VIAL (EA) INJECTION ONCE
Qty: 0 | Refills: 0 | Status: COMPLETED | OUTPATIENT
Start: 2018-10-22 | End: 2018-10-22

## 2018-10-22 RX ORDER — APIXABAN 2.5 MG/1
1 TABLET, FILM COATED ORAL
Qty: 0 | Refills: 0 | COMMUNITY

## 2018-10-22 RX ORDER — DEXTROSE 50 % IN WATER 50 %
25 SYRINGE (ML) INTRAVENOUS ONCE
Qty: 0 | Refills: 0 | Status: DISCONTINUED | OUTPATIENT
Start: 2018-10-22 | End: 2018-10-22

## 2018-10-22 RX ORDER — ACETAMINOPHEN 500 MG
1000 TABLET ORAL ONCE
Qty: 0 | Refills: 0 | Status: COMPLETED | OUTPATIENT
Start: 2018-10-22 | End: 2018-10-22

## 2018-10-22 RX ORDER — ONDANSETRON 8 MG/1
4 TABLET, FILM COATED ORAL ONCE
Qty: 0 | Refills: 0 | Status: DISCONTINUED | OUTPATIENT
Start: 2018-10-22 | End: 2018-10-22

## 2018-10-22 RX ORDER — DEXTROSE 50 % IN WATER 50 %
12.5 SYRINGE (ML) INTRAVENOUS ONCE
Qty: 0 | Refills: 0 | Status: DISCONTINUED | OUTPATIENT
Start: 2018-10-22 | End: 2018-10-22

## 2018-10-22 RX ORDER — HYDROMORPHONE HYDROCHLORIDE 2 MG/ML
0.5 INJECTION INTRAMUSCULAR; INTRAVENOUS; SUBCUTANEOUS
Qty: 0 | Refills: 0 | Status: DISCONTINUED | OUTPATIENT
Start: 2018-10-22 | End: 2018-10-22

## 2018-10-22 RX ORDER — HYDROMORPHONE HYDROCHLORIDE 2 MG/ML
1 INJECTION INTRAMUSCULAR; INTRAVENOUS; SUBCUTANEOUS
Qty: 0 | Refills: 0 | Status: DISCONTINUED | OUTPATIENT
Start: 2018-10-22 | End: 2018-10-22

## 2018-10-22 RX ORDER — DEXTROSE 50 % IN WATER 50 %
12.5 SYRINGE (ML) INTRAVENOUS ONCE
Qty: 0 | Refills: 0 | Status: COMPLETED | OUTPATIENT
Start: 2018-10-22 | End: 2018-10-22

## 2018-10-22 RX ORDER — SODIUM CHLORIDE 9 MG/ML
1000 INJECTION, SOLUTION INTRAVENOUS
Qty: 0 | Refills: 0 | Status: DISCONTINUED | OUTPATIENT
Start: 2018-10-22 | End: 2018-10-22

## 2018-10-22 RX ORDER — DEXTROSE 50 % IN WATER 50 %
12.5 SYRINGE (ML) INTRAVENOUS ONCE
Qty: 0 | Refills: 0 | Status: DISCONTINUED | OUTPATIENT
Start: 2018-10-22 | End: 2018-11-06

## 2018-10-22 RX ORDER — ZOLPIDEM TARTRATE 10 MG/1
1 TABLET ORAL
Qty: 0 | Refills: 0 | COMMUNITY

## 2018-10-22 RX ORDER — OXYCODONE HYDROCHLORIDE 5 MG/1
1 TABLET ORAL
Qty: 10 | Refills: 0 | OUTPATIENT
Start: 2018-10-22 | End: 2018-10-23

## 2018-10-22 RX ADMIN — SODIUM CHLORIDE 40 MILLILITER(S): 9 INJECTION, SOLUTION INTRAVENOUS at 06:43

## 2018-10-22 RX ADMIN — Medication 400 MILLIGRAM(S): at 14:58

## 2018-10-22 RX ADMIN — HYDROMORPHONE HYDROCHLORIDE 0.5 MILLIGRAM(S): 2 INJECTION INTRAMUSCULAR; INTRAVENOUS; SUBCUTANEOUS at 10:20

## 2018-10-22 RX ADMIN — SODIUM CHLORIDE 50 MILLILITER(S): 9 INJECTION, SOLUTION INTRAVENOUS at 10:07

## 2018-10-22 RX ADMIN — HYDROMORPHONE HYDROCHLORIDE 0.5 MILLIGRAM(S): 2 INJECTION INTRAMUSCULAR; INTRAVENOUS; SUBCUTANEOUS at 10:05

## 2018-10-22 RX ADMIN — HYDROMORPHONE HYDROCHLORIDE 1 MILLIGRAM(S): 2 INJECTION INTRAMUSCULAR; INTRAVENOUS; SUBCUTANEOUS at 10:25

## 2018-10-22 RX ADMIN — Medication 12.5 MILLILITER(S): at 10:52

## 2018-10-22 RX ADMIN — HYDROMORPHONE HYDROCHLORIDE 1 MILLIGRAM(S): 2 INJECTION INTRAMUSCULAR; INTRAVENOUS; SUBCUTANEOUS at 10:40

## 2018-10-22 NOTE — ASU DISCHARGE PLAN (ADULT/PEDIATRIC). - NOTIFY
Fever greater than 101/Pain not relieved by Medications/Unable to Urinate/Bleeding that does not stop/Inability to Tolerate Liquids or Foods/Persistent Nausea and Vomiting

## 2018-10-22 NOTE — PROVIDER CONTACT NOTE (MEDICATION) - ASSESSMENT
bs=94 attempted po only able to tolerate about 2 oz juice bs repeated after 15 mins =91 pt unable to delio po fluids

## 2018-10-22 NOTE — ASU DISCHARGE PLAN (ADULT/PEDIATRIC). - MEDICATION SUMMARY - MEDICATIONS TO TAKE
I will START or STAY ON the medications listed below when I get home from the hospital:    Tylenol Extra Strength 500 mg oral tablet  -- 2 tab(s) by mouth every 6 hours, As Needed  -- Indication: For MILD TO MODERATE PAIN     oxyCODONE 5 mg oral tablet  -- 1 tab(s) by mouth every 8 hours, As Needed -for severe pain MDD:3  -- Caution federal law prohibits the transfer of this drug to any person other  than the person for whom it was prescribed.  It is very important that you take or use this exactly as directed.  Do not skip doses or discontinue unless directed by your doctor.  May cause drowsiness.  Alcohol may intensify this effect.  Use care when operating dangerous machinery.  This prescription cannot be refilled.  Using more of this medication than prescribed may cause serious breathing problems.    -- Indication: For SEVERE PAIN     lisinopril 5 mg oral tablet  -- 1 tab(s) by mouth once a day  -- Indication: For HIGH BLOOD PRESSURE     Eliquis 5 mg oral tablet  -- 1 tab(s) by mouth 2 times a day  -- Indication: For PULMONARY EMBOLISM     escitalopram 10 mg oral tablet  -- 1 tab(s) by mouth once a day  -- Indication: For DEPRESSION     metFORMIN 500 mg oral tablet  -- 1 tab(s) by mouth once a day  -- Indication: For DIABETES     zolpidem 12.5 mg oral tablet, extended release  -- 1 tab(s) by mouth once a day (at bedtime), As Needed  -- Indication: For INSOMNIA     Spiriva 18 mcg inhalation capsule  -- 1 cap(s) inhaled once a day  -- Indication: For ASTHMA     ProAir HFA 90 mcg/inh inhalation aerosol  -- 2 puff(s) inhaled 4 times a day, As Needed  -- Indication: For ASTHMA     Advair Diskus 500 mcg-50 mcg inhalation powder  -- 1 puff(s) inhaled 2 times a day  -- Indication: For ASTHMA     Norvasc 5 mg oral tablet  -- 1 tab(s) by mouth once a day  -- Indication: For HIGH BLOOD PRESSURE     Nasacort 55 mcg/inh nasal aerosol  -- 2 spray(s) into nose once a day, As Needed  -- Indication: For ASTHMA I will START or STAY ON the medications listed below when I get home from the hospital:    Tylenol Extra Strength 500 mg oral tablet  -- 2 tab(s) by mouth every 6 hours, As Needed  -- Indication: For MILD TO MODERATE PAIN     oxyCODONE 5 mg oral tablet  -- 1 tab(s) by mouth every 8 hours, As Needed -for severe pain MDD:3  -- Caution federal law prohibits the transfer of this drug to any person other  than the person for whom it was prescribed.  It is very important that you take or use this exactly as directed.  Do not skip doses or discontinue unless directed by your doctor.  May cause drowsiness.  Alcohol may intensify this effect.  Use care when operating dangerous machinery.  This prescription cannot be refilled.  Using more of this medication than prescribed may cause serious breathing problems.    -- Indication: For SEVERE PAIN     lisinopril 5 mg oral tablet  -- 1 tab(s) by mouth once a day  -- Indication: For HIGH BLOOD PRESSURE     Eliquis 5 mg oral tablet  -- 1 tab(s) by mouth 2 times a day  -- Indication: For HISTORY OF PULMONARY EMBOLISM, RESUME TUESDAY 10/23/2018    escitalopram 10 mg oral tablet  -- 1 tab(s) by mouth once a day  -- Indication: For DEPRESSION     metFORMIN 500 mg oral tablet  -- 1 tab(s) by mouth once a day  -- Indication: For DIABETES     zolpidem 12.5 mg oral tablet, extended release  -- 1 tab(s) by mouth once a day (at bedtime), As Needed  -- Indication: For INSOMNIA,  RESUME TUESDAY 10/23/2018    Advair Diskus 500 mcg-50 mcg inhalation powder  -- 1 puff(s) inhaled 2 times a day  -- Indication: For ASTHMA     ProAir HFA 90 mcg/inh inhalation aerosol  -- 2 puff(s) inhaled 4 times a day, As Needed  -- Indication: For ASTHMA     Spiriva 18 mcg inhalation capsule  -- 1 cap(s) inhaled once a day  -- Indication: For ASTHMA     Norvasc 5 mg oral tablet  -- 1 tab(s) by mouth once a day  -- Indication: For HIGH BLOOD PRESSURE     Nasacort 55 mcg/inh nasal aerosol  -- 2 spray(s) into nose once a day, As Needed  -- Indication: For ASTHMA

## 2018-10-22 NOTE — ASU PATIENT PROFILE, ADULT - PSH
H/O hiatal hernia  repaired  H/O submucous nasal surgery  septoplasty  S/P Achilles tendon repair  right - done 2x  S/P carpal tunnel release  right  S/P laparoscopic fundoplication  2x  S/P tonsillectomy and adenoidectomy

## 2018-10-22 NOTE — BRIEF OPERATIVE NOTE - PROCEDURE
<<-----Click on this checkbox to enter Procedure Gastric stimulator implant surgery  10/22/2018    Active  RAYMOND

## 2018-10-22 NOTE — BRIEF OPERATIVE NOTE - OPERATION/FINDINGS
LAPAROSCOPIC GASTRIC NEUROSTIMULATOR PLACED , ELECTRODES SECURED IN PLACE, PLACEMENT CONFIRMED WITH EGD. SC POCKET MADE FOR THE GENERATOR, DEVICE CHECKED PRE AND POST CLOSURE  WITH COMPANY'S  REP.

## 2018-10-23 RX ORDER — APIXABAN 2.5 MG/1
1 TABLET, FILM COATED ORAL
Qty: 0 | Refills: 0 | DISCHARGE
Start: 2018-10-23

## 2018-10-23 RX ORDER — ZOLPIDEM TARTRATE 10 MG/1
1 TABLET ORAL
Qty: 0 | Refills: 0 | COMMUNITY
Start: 2018-10-23

## 2018-10-23 RX ORDER — APIXABAN 2.5 MG/1
1 TABLET, FILM COATED ORAL
Qty: 0 | Refills: 0 | COMMUNITY
Start: 2018-10-23

## 2018-10-30 ENCOUNTER — APPOINTMENT (OUTPATIENT)
Dept: SURGERY | Facility: CLINIC | Age: 53
End: 2018-10-30
Payer: COMMERCIAL

## 2018-10-30 VITALS
WEIGHT: 220 LBS | HEIGHT: 72 IN | BODY MASS INDEX: 29.8 KG/M2 | SYSTOLIC BLOOD PRESSURE: 120 MMHG | HEART RATE: 88 BPM | RESPIRATION RATE: 14 BRPM | OXYGEN SATURATION: 99 % | DIASTOLIC BLOOD PRESSURE: 82 MMHG

## 2018-10-30 PROCEDURE — 99024 POSTOP FOLLOW-UP VISIT: CPT

## 2018-11-04 ENCOUNTER — TRANSCRIPTION ENCOUNTER (OUTPATIENT)
Age: 53
End: 2018-11-04

## 2018-11-08 ENCOUNTER — APPOINTMENT (OUTPATIENT)
Dept: GASTROENTEROLOGY | Facility: CLINIC | Age: 53
End: 2018-11-08
Payer: COMMERCIAL

## 2018-11-08 VITALS
DIASTOLIC BLOOD PRESSURE: 78 MMHG | TEMPERATURE: 98 F | OXYGEN SATURATION: 97 % | BODY MASS INDEX: 29.8 KG/M2 | WEIGHT: 220 LBS | SYSTOLIC BLOOD PRESSURE: 126 MMHG | HEART RATE: 88 BPM | HEIGHT: 72 IN

## 2018-11-08 DIAGNOSIS — R06.6 HICCOUGH: ICD-10-CM

## 2018-11-08 DIAGNOSIS — D51.0 VITAMIN B12 DEFICIENCY ANEMIA DUE TO INTRINSIC FACTOR DEFICIENCY: ICD-10-CM

## 2018-11-08 DIAGNOSIS — R07.89 OTHER CHEST PAIN: ICD-10-CM

## 2018-11-08 DIAGNOSIS — K12.1 OTHER FORMS OF STOMATITIS: ICD-10-CM

## 2018-11-08 DIAGNOSIS — Z86.010 PERSONAL HISTORY OF COLONIC POLYPS: ICD-10-CM

## 2018-11-08 PROCEDURE — 99214 OFFICE O/P EST MOD 30 MIN: CPT

## 2018-12-29 PROBLEM — K20.9 LYMPHOCYTIC ESOPHAGITIS: Status: ACTIVE | Noted: 2018-06-20

## 2018-12-29 PROBLEM — K29.40 ATROPHIC GASTRITIS: Status: ACTIVE | Noted: 2018-06-20

## 2018-12-29 PROBLEM — R51 ACUTE FACIAL PAIN: Status: RESOLVED | Noted: 2018-07-24 | Resolved: 2018-07-31

## 2018-12-29 PROBLEM — T18.2XXA: Status: ACTIVE | Noted: 2018-06-15

## 2018-12-29 PROBLEM — K31.83 ACHLORHYDRIA, GASTRIC: Status: ACTIVE | Noted: 2018-07-13

## 2018-12-31 ENCOUNTER — MOBILE ON CALL (OUTPATIENT)
Age: 53
End: 2018-12-31

## 2019-01-01 LAB
BASOPHILS # BLD AUTO: 0 K/UL
BASOPHILS NFR BLD AUTO: 0 %
EOSINOPHIL # BLD AUTO: 0 K/UL
EOSINOPHIL NFR BLD AUTO: 0 %
FERRITIN SERPL-MCNC: 295 NG/ML
FOLATE SERPL-MCNC: 8.9 NG/ML
GASTRIN SERPL-MCNC: 404 PG/ML
HCT VFR BLD CALC: 43.5 %
HGB BLD-MCNC: 13.4 G/DL
IF BLOCK AB SER QL: NORMAL
IRON SATN MFR SERPL: 9 %
IRON SERPL-MCNC: 48 UG/DL
LYMPHOCYTES # BLD AUTO: 0.99 K/UL
LYMPHOCYTES NFR BLD AUTO: 10.9 %
MAN DIFF?: NORMAL
MCHC RBC-ENTMCNC: 24.5 PG
MCHC RBC-ENTMCNC: 30.8 GM/DL
MCV RBC AUTO: 79.4 FL
MONOCYTES # BLD AUTO: 0.33 K/UL
MONOCYTES NFR BLD AUTO: 3.6 %
NEUTROPHILS # BLD AUTO: 7.78 K/UL
NEUTROPHILS NFR BLD AUTO: 85.5 %
PCA AB SER QL IF: ABNORMAL
PLATELET # BLD AUTO: 459 K/UL
RBC # BLD: 5.48 M/UL
RBC # FLD: 21 %
TIBC SERPL-MCNC: 508 UG/DL
UIBC SERPL-MCNC: 460 UG/DL
VIT B12 SERPL-MCNC: 483 PG/ML
WBC # FLD AUTO: 9.1 K/UL

## 2019-01-14 ENCOUNTER — APPOINTMENT (OUTPATIENT)
Dept: GASTROENTEROLOGY | Facility: CLINIC | Age: 54
End: 2019-01-14

## 2019-01-24 ENCOUNTER — APPOINTMENT (OUTPATIENT)
Dept: GASTROENTEROLOGY | Facility: CLINIC | Age: 54
End: 2019-01-24
Payer: COMMERCIAL

## 2019-01-24 VITALS
TEMPERATURE: 98 F | HEIGHT: 72 IN | OXYGEN SATURATION: 98 % | HEART RATE: 90 BPM | RESPIRATION RATE: 14 BRPM | SYSTOLIC BLOOD PRESSURE: 110 MMHG | BODY MASS INDEX: 29.8 KG/M2 | WEIGHT: 220 LBS | DIASTOLIC BLOOD PRESSURE: 82 MMHG

## 2019-01-24 PROCEDURE — 99214 OFFICE O/P EST MOD 30 MIN: CPT

## 2019-01-24 NOTE — REVIEW OF SYSTEMS
[Recent Weight Loss (___ Lbs)] : recent [unfilled] ~Ulb weight loss [As Noted in HPI] : as noted in HPI [Negative] : Heme/Lymph [Recent Weight Gain (___ Lbs)] : no recent weight gain

## 2019-01-24 NOTE — PHYSICAL EXAM
[General Appearance - Alert] : alert [General Appearance - In No Acute Distress] : in no acute distress [General Appearance - Well Nourished] : well nourished [General Appearance - Well Developed] : well developed [General Appearance - Well-Appearing] : healthy appearing [Sclera] : the sclera and conjunctiva were normal [Neck Appearance] : the appearance of the neck was normal [Neck Cervical Mass (___cm)] : no neck mass was observed [Jugular Venous Distention Increased] : there was no jugular-venous distention [Exaggerated Use Of Accessory Muscles For Inspiration] : no accessory muscle use [Heart Sounds] : normal S1 and S2 [Abdomen Soft] : soft [Abdomen Tenderness] : non-tender [Abdomen Mass (___ Cm)] : no abdominal mass palpated [Cervical Lymph Nodes Enlarged Posterior Bilaterally] : posterior cervical [Cervical Lymph Nodes Enlarged Anterior Bilaterally] : anterior cervical [No CVA Tenderness] : no ~M costovertebral angle tenderness [No Spinal Tenderness] : no spinal tenderness [] : no rash [No Focal Deficits] : no focal deficits [Oriented To Time, Place, And Person] : oriented to person, place, and time [Impaired Insight] : insight and judgment were intact [Affect] : the affect was normal

## 2019-01-24 NOTE — HISTORY OF PRESENT ILLNESS
[FreeTextEntry1] : 53 with gastroparesis. Symptoms were GERD and bloating predominant and started after 911, where he was a [facility] . He underwent two fundoplications for his GERD symptoms, though prior to this a gastric emptying had never been done. His reflux somewhat improved but he has difficulty with bloating. He was then diagnosed by Dr. Oliveros with GP and underwent a gastric pacemaker by Dr. Herrera. he has been symptomatically better but again many symptoms persist and he is here to further discuss the possibility iof a GPOEM. He has not had any repeat gastric emptying studies after his PPM. \par His BMs are normal. he believes he has lost weight. No diarrhea or steatorrhea. No vomiting, but dyspepsia, fullness and nausea are common. He also has hiccups and belching issues which have started since the fundoplication.

## 2019-01-24 NOTE — ASSESSMENT
[FreeTextEntry1] : Repeat gastric emptying to ascertain what improvement if any he has had in gastric emptying post PPM. if the emptying is truly delayed we will offer a G POEM. This has a 70% chance of objective improvement and 85% chance of symptomatic improvement. He understands that there is a cohort of patients that do not respond but to date there is no good way to predict who that will be.

## 2019-01-31 ENCOUNTER — FORM ENCOUNTER (OUTPATIENT)
Age: 54
End: 2019-01-31

## 2019-02-01 ENCOUNTER — APPOINTMENT (OUTPATIENT)
Dept: NUCLEAR MEDICINE | Facility: HOSPITAL | Age: 54
End: 2019-02-01
Payer: COMMERCIAL

## 2019-02-01 ENCOUNTER — OUTPATIENT (OUTPATIENT)
Dept: OUTPATIENT SERVICES | Facility: HOSPITAL | Age: 54
LOS: 1 days | End: 2019-02-01

## 2019-02-01 DIAGNOSIS — Z98.890 OTHER SPECIFIED POSTPROCEDURAL STATES: Chronic | ICD-10-CM

## 2019-02-01 DIAGNOSIS — Z90.89 ACQUIRED ABSENCE OF OTHER ORGANS: Chronic | ICD-10-CM

## 2019-02-01 DIAGNOSIS — Z87.19 PERSONAL HISTORY OF OTHER DISEASES OF THE DIGESTIVE SYSTEM: Chronic | ICD-10-CM

## 2019-02-01 DIAGNOSIS — K31.84 GASTROPARESIS: ICD-10-CM

## 2019-02-01 PROCEDURE — 78264 GASTRIC EMPTYING IMG STUDY: CPT | Mod: 26

## 2019-02-07 ENCOUNTER — APPOINTMENT (OUTPATIENT)
Dept: SURGERY | Facility: CLINIC | Age: 54
End: 2019-02-07

## 2019-02-14 ENCOUNTER — APPOINTMENT (OUTPATIENT)
Dept: GASTROENTEROLOGY | Facility: CLINIC | Age: 54
End: 2019-02-14
Payer: COMMERCIAL

## 2019-02-14 VITALS
DIASTOLIC BLOOD PRESSURE: 80 MMHG | RESPIRATION RATE: 14 BRPM | OXYGEN SATURATION: 97 % | BODY MASS INDEX: 29.8 KG/M2 | TEMPERATURE: 98.5 F | SYSTOLIC BLOOD PRESSURE: 120 MMHG | HEIGHT: 72 IN | WEIGHT: 220 LBS | HEART RATE: 92 BPM

## 2019-02-14 PROCEDURE — 99213 OFFICE O/P EST LOW 20 MIN: CPT

## 2019-02-22 NOTE — HISTORY OF PRESENT ILLNESS
[FreeTextEntry1] : 53 year old male with gastroparesis. Underwent two fundoplications for GERD symptoms and recently underwent gastric pacemaker by Dr. Herrera. Symptomatically better but has persisting symptoms. Post-ppm GES is improved but remains abnormal. Here to discuss GPOEM.

## 2019-02-22 NOTE — ASSESSMENT
[FreeTextEntry1] : Plan for G POEM\par patient still has evidence of gastroparesis on most recent study and is symptomatic \par We have discussed the possibility that G POEm may have no effect, and also the possible risks involved which include bleeding, infection, perforation and.or leak.\par

## 2019-03-08 ENCOUNTER — APPOINTMENT (OUTPATIENT)
Dept: GASTROENTEROLOGY | Facility: HOSPITAL | Age: 54
End: 2019-03-08

## 2019-03-08 ENCOUNTER — INPATIENT (INPATIENT)
Facility: HOSPITAL | Age: 54
LOS: 0 days | Discharge: HOME | End: 2019-03-09
Attending: HOSPITALIST | Admitting: HOSPITALIST
Payer: COMMERCIAL

## 2019-03-08 VITALS
DIASTOLIC BLOOD PRESSURE: 95 MMHG | SYSTOLIC BLOOD PRESSURE: 146 MMHG | RESPIRATION RATE: 18 BRPM | TEMPERATURE: 97 F | HEART RATE: 80 BPM | OXYGEN SATURATION: 100 %

## 2019-03-08 DIAGNOSIS — I26.99 OTHER PULMONARY EMBOLISM WITHOUT ACUTE COR PULMONALE: ICD-10-CM

## 2019-03-08 DIAGNOSIS — J45.909 UNSPECIFIED ASTHMA, UNCOMPLICATED: ICD-10-CM

## 2019-03-08 DIAGNOSIS — K21.9 GASTRO-ESOPHAGEAL REFLUX DISEASE WITHOUT ESOPHAGITIS: ICD-10-CM

## 2019-03-08 DIAGNOSIS — I10 ESSENTIAL (PRIMARY) HYPERTENSION: ICD-10-CM

## 2019-03-08 DIAGNOSIS — E11.9 TYPE 2 DIABETES MELLITUS WITHOUT COMPLICATIONS: ICD-10-CM

## 2019-03-08 DIAGNOSIS — Z98.890 OTHER SPECIFIED POSTPROCEDURAL STATES: Chronic | ICD-10-CM

## 2019-03-08 DIAGNOSIS — R13.10 DYSPHAGIA, UNSPECIFIED: ICD-10-CM

## 2019-03-08 DIAGNOSIS — Z90.89 ACQUIRED ABSENCE OF OTHER ORGANS: Chronic | ICD-10-CM

## 2019-03-08 DIAGNOSIS — K31.84 GASTROPARESIS: ICD-10-CM

## 2019-03-08 DIAGNOSIS — Z29.9 ENCOUNTER FOR PROPHYLACTIC MEASURES, UNSPECIFIED: ICD-10-CM

## 2019-03-08 DIAGNOSIS — F32.9 MAJOR DEPRESSIVE DISORDER, SINGLE EPISODE, UNSPECIFIED: ICD-10-CM

## 2019-03-08 DIAGNOSIS — R07.89 OTHER CHEST PAIN: ICD-10-CM

## 2019-03-08 DIAGNOSIS — Z87.19 PERSONAL HISTORY OF OTHER DISEASES OF THE DIGESTIVE SYSTEM: Chronic | ICD-10-CM

## 2019-03-08 DIAGNOSIS — G47.00 INSOMNIA, UNSPECIFIED: ICD-10-CM

## 2019-03-08 LAB
ALBUMIN SERPL ELPH-MCNC: 4.4 G/DL — SIGNIFICANT CHANGE UP (ref 3.3–5)
ALP SERPL-CCNC: 66 U/L — SIGNIFICANT CHANGE UP (ref 40–120)
ALT FLD-CCNC: 45 U/L — HIGH (ref 4–41)
ANION GAP SERPL CALC-SCNC: 12 MMO/L — SIGNIFICANT CHANGE UP (ref 7–14)
AST SERPL-CCNC: 30 U/L — SIGNIFICANT CHANGE UP (ref 4–40)
BILIRUB SERPL-MCNC: 0.7 MG/DL — SIGNIFICANT CHANGE UP (ref 0.2–1.2)
BUN SERPL-MCNC: 10 MG/DL — SIGNIFICANT CHANGE UP (ref 7–23)
CALCIUM SERPL-MCNC: 9.6 MG/DL — SIGNIFICANT CHANGE UP (ref 8.4–10.5)
CHLORIDE SERPL-SCNC: 103 MMOL/L — SIGNIFICANT CHANGE UP (ref 98–107)
CO2 SERPL-SCNC: 27 MMOL/L — SIGNIFICANT CHANGE UP (ref 22–31)
CREAT SERPL-MCNC: 1.13 MG/DL — SIGNIFICANT CHANGE UP (ref 0.5–1.3)
GLUCOSE SERPL-MCNC: 119 MG/DL — HIGH (ref 70–99)
HBA1C BLD-MCNC: 5.6 % — SIGNIFICANT CHANGE UP (ref 4–5.6)
HCT VFR BLD CALC: 45.9 % — SIGNIFICANT CHANGE UP (ref 39–50)
HGB BLD-MCNC: 15.1 G/DL — SIGNIFICANT CHANGE UP (ref 13–17)
MCHC RBC-ENTMCNC: 29.9 PG — SIGNIFICANT CHANGE UP (ref 27–34)
MCHC RBC-ENTMCNC: 32.9 % — SIGNIFICANT CHANGE UP (ref 32–36)
MCV RBC AUTO: 90.9 FL — SIGNIFICANT CHANGE UP (ref 80–100)
NRBC # FLD: 0 K/UL — LOW (ref 25–125)
PLATELET # BLD AUTO: 226 K/UL — SIGNIFICANT CHANGE UP (ref 150–400)
PMV BLD: 10.3 FL — SIGNIFICANT CHANGE UP (ref 7–13)
POTASSIUM SERPL-MCNC: 4 MMOL/L — SIGNIFICANT CHANGE UP (ref 3.5–5.3)
POTASSIUM SERPL-SCNC: 4 MMOL/L — SIGNIFICANT CHANGE UP (ref 3.5–5.3)
PROT SERPL-MCNC: 7.8 G/DL — SIGNIFICANT CHANGE UP (ref 6–8.3)
RBC # BLD: 5.05 M/UL — SIGNIFICANT CHANGE UP (ref 4.2–5.8)
RBC # FLD: 13.4 % — SIGNIFICANT CHANGE UP (ref 10.3–14.5)
SODIUM SERPL-SCNC: 142 MMOL/L — SIGNIFICANT CHANGE UP (ref 135–145)
TROPONIN T, HIGH SENSITIVITY: 8 NG/L — SIGNIFICANT CHANGE UP (ref ?–14)
WBC # BLD: 8.77 K/UL — SIGNIFICANT CHANGE UP (ref 3.8–10.5)
WBC # FLD AUTO: 8.77 K/UL — SIGNIFICANT CHANGE UP (ref 3.8–10.5)

## 2019-03-08 PROCEDURE — 99223 1ST HOSP IP/OBS HIGH 75: CPT

## 2019-03-08 PROCEDURE — 71045 X-RAY EXAM CHEST 1 VIEW: CPT | Mod: 26

## 2019-03-08 RX ORDER — LISINOPRIL 2.5 MG/1
1 TABLET ORAL
Qty: 0 | Refills: 0 | COMMUNITY

## 2019-03-08 RX ORDER — BUDESONIDE AND FORMOTEROL FUMARATE DIHYDRATE 160; 4.5 UG/1; UG/1
2 AEROSOL RESPIRATORY (INHALATION)
Qty: 0 | Refills: 0 | Status: DISCONTINUED | OUTPATIENT
Start: 2019-03-08 | End: 2019-03-09

## 2019-03-08 RX ORDER — PANTOPRAZOLE SODIUM 20 MG/1
40 TABLET, DELAYED RELEASE ORAL
Qty: 0 | Refills: 0 | Status: DISCONTINUED | OUTPATIENT
Start: 2019-03-08 | End: 2019-03-09

## 2019-03-08 RX ORDER — DEXTROSE 50 % IN WATER 50 %
15 SYRINGE (ML) INTRAVENOUS ONCE
Qty: 0 | Refills: 0 | Status: DISCONTINUED | OUTPATIENT
Start: 2019-03-08 | End: 2019-03-09

## 2019-03-08 RX ORDER — FLUOXETINE HCL 10 MG
20 CAPSULE ORAL DAILY
Qty: 0 | Refills: 0 | Status: DISCONTINUED | OUTPATIENT
Start: 2019-03-08 | End: 2019-03-09

## 2019-03-08 RX ORDER — METOPROLOL TARTRATE 50 MG
50 TABLET ORAL DAILY
Qty: 0 | Refills: 0 | Status: DISCONTINUED | OUTPATIENT
Start: 2019-03-08 | End: 2019-03-09

## 2019-03-08 RX ORDER — ALBUTEROL 90 UG/1
1 AEROSOL, METERED ORAL
Qty: 0 | Refills: 0 | Status: DISCONTINUED | OUTPATIENT
Start: 2019-03-08 | End: 2019-03-09

## 2019-03-08 RX ORDER — SODIUM CHLORIDE 9 MG/ML
1000 INJECTION, SOLUTION INTRAVENOUS
Qty: 0 | Refills: 0 | Status: DISCONTINUED | OUTPATIENT
Start: 2019-03-08 | End: 2019-03-09

## 2019-03-08 RX ORDER — ZOLPIDEM TARTRATE 10 MG/1
5 TABLET ORAL AT BEDTIME
Qty: 0 | Refills: 0 | Status: DISCONTINUED | OUTPATIENT
Start: 2019-03-08 | End: 2019-03-09

## 2019-03-08 RX ORDER — DEXTROSE 50 % IN WATER 50 %
12.5 SYRINGE (ML) INTRAVENOUS ONCE
Qty: 0 | Refills: 0 | Status: DISCONTINUED | OUTPATIENT
Start: 2019-03-08 | End: 2019-03-09

## 2019-03-08 RX ORDER — ACETAMINOPHEN 500 MG
2 TABLET ORAL
Qty: 0 | Refills: 0 | COMMUNITY

## 2019-03-08 RX ORDER — GLUCAGON INJECTION, SOLUTION 0.5 MG/.1ML
1 INJECTION, SOLUTION SUBCUTANEOUS ONCE
Qty: 0 | Refills: 0 | Status: DISCONTINUED | OUTPATIENT
Start: 2019-03-08 | End: 2019-03-09

## 2019-03-08 RX ORDER — TRIAMCINOLONE ACETONIDE 55 MCG
2 AEROSOL, SPRAY (GRAM) NASAL
Qty: 0 | Refills: 0 | COMMUNITY

## 2019-03-08 RX ORDER — AMLODIPINE BESYLATE 2.5 MG/1
1 TABLET ORAL
Qty: 0 | Refills: 0 | COMMUNITY

## 2019-03-08 RX ORDER — INSULIN LISPRO 100/ML
VIAL (ML) SUBCUTANEOUS EVERY 6 HOURS
Qty: 0 | Refills: 0 | Status: DISCONTINUED | OUTPATIENT
Start: 2019-03-08 | End: 2019-03-09

## 2019-03-08 RX ORDER — DULAGLUTIDE 4.5 MG/.5ML
1 INJECTION, SOLUTION SUBCUTANEOUS
Qty: 0 | Refills: 0 | COMMUNITY

## 2019-03-08 RX ORDER — METFORMIN HYDROCHLORIDE 850 MG/1
1 TABLET ORAL
Qty: 0 | Refills: 0 | COMMUNITY

## 2019-03-08 RX ORDER — TIOTROPIUM BROMIDE 18 UG/1
1 CAPSULE ORAL; RESPIRATORY (INHALATION) DAILY
Qty: 0 | Refills: 0 | Status: DISCONTINUED | OUTPATIENT
Start: 2019-03-08 | End: 2019-03-09

## 2019-03-08 RX ORDER — ACETAMINOPHEN 500 MG
975 TABLET ORAL ONCE
Qty: 0 | Refills: 0 | Status: COMPLETED | OUTPATIENT
Start: 2019-03-08 | End: 2019-03-08

## 2019-03-08 RX ADMIN — TIOTROPIUM BROMIDE 1 CAPSULE(S): 18 CAPSULE ORAL; RESPIRATORY (INHALATION) at 22:30

## 2019-03-08 RX ADMIN — PANTOPRAZOLE SODIUM 40 MILLIGRAM(S): 20 TABLET, DELAYED RELEASE ORAL at 21:21

## 2019-03-08 RX ADMIN — Medication 20 MILLIGRAM(S): at 21:23

## 2019-03-08 RX ADMIN — Medication 975 MILLIGRAM(S): at 22:23

## 2019-03-08 RX ADMIN — Medication 975 MILLIGRAM(S): at 21:23

## 2019-03-08 RX ADMIN — BUDESONIDE AND FORMOTEROL FUMARATE DIHYDRATE 2 PUFF(S): 160; 4.5 AEROSOL RESPIRATORY (INHALATION) at 22:30

## 2019-03-08 RX ADMIN — Medication 50 MILLIGRAM(S): at 21:23

## 2019-03-08 NOTE — H&P ADULT - PROBLEM SELECTOR PLAN 3
s/p POEM procedure, appreciate GI recs  IV PPI BID  Advance to clears tomorrow and continue for 72 hours followed by Full liquid diet for 72 hours.   Levaquin 500 PO daily for 7 days  elevated head of bed.   NPO for now.

## 2019-03-08 NOTE — H&P ADULT - NSHPREVIEWOFSYSTEMS_GEN_ALL_CORE
REVIEW OF SYSTEMS  General: no sweating; fever; chills; + weight loss unintended.  Skin/Breast: no rash; no itching; no dryness	  ENMT: no sinus symptoms: no throat pain  oral pain with lesions.  Respiratory and Thorax: no wheezing; no dyspnea; no cough; no hemoptysis, no sputum production, no stridor	  Cardiovascular: + Chest pain; no palpitations; no dyspnea on exertion; no orthopnea, no pedal edema  Gastrointestinal: no nausea; no vomiting; no melena; no hematochezia; no jaundice; no diarrhea, + abminal pain. chronic GERD symptoms.   Genitourinary: no hematuria; no renal colic; no flank pain L; no flank pain R; no urine discoloration; no dysuria  Musculoskeletal: no arthralgia; no arthritis; no joint swelling; no myalgia  Neurological: no weakness; no headache; no loss of consciousness; no confusion  Psychiatric: no suicidal ideation; no depression; no anxiety; no insomnia  Hematology/Lymphatics: no gum bleeding; no nose bleeding; no skin lumps  Endocrine:	No heat/cold intolerence, no polydipsia, or polyuria.

## 2019-03-08 NOTE — H&P ADULT - FAMILY HISTORY
Father  Still living? Unknown  Family history of diabetes mellitus, Age at diagnosis: Age Unknown  Family history of prostate cancer, Age at diagnosis: Age Unknown     Sibling  Still living? Unknown  Family history of diabetes mellitus, Age at diagnosis: Age Unknown     Mother  Still living? Unknown  Family history of other heart disease, Age at diagnosis: Age Unknown

## 2019-03-08 NOTE — CONSULT NOTE ADULT - ASSESSMENT
Impression:    1. Chest pain: ddx due to intubation vs. esophageal irritation during procedure.    2. Gastroparesis s/p G-POEM    3. PE on Eliquis    Recommendation:  - Admit the patient to hospital [facility] for observation.  - NPO today except for ice chips  - Start clear liquids tomorrow for 72 hours  - Follow this with full liquids for 72 hours  - Levaquin 500 mg PO daily for 7 days  - IV PPI 40 mg BID  - Elevated head of bed  - Hold Eliquis for 48 hours Impression:    1. Chest pain: ddx due to intubation vs. esophageal irritation during procedure. XR/ECG unremarkable.    2. Gastroparesis s/p G-POEM    3. PE on Eliquis    Recommendation:  - Admit the patient to hospital [facility] for observation.  - NPO today except for ice chips  - Start clear liquids tomorrow for 72 hours  - Follow this with full liquids for 72 hours  - Levaquin 500 mg PO daily for 7 days  - IV PPI 40 mg BID  - Elevated head of bed  - Hold Eliquis for 48 hours  - Check routine labs. Impression:    1. Chest pain: ddx due to intubation vs. esophageal irritation during procedure. XR/ECG unremarkable.    2. Gastroparesis s/p G-POEM    3. PE on Eliquis    Recommendation:  - Admit the patient to hospital [facility] for observation.  - NPO today except for ice chips  - Start clear liquids tomorrow for 72 hours  - Follow this with full liquids for 72 hours  - Levaquin 500 mg PO daily for 7 days  - IV PPI 40 mg BID  - Elevated head of bed  - Hold Eliquis for 48 hours  - Check routine labs.  - F/u official ECG read

## 2019-03-08 NOTE — CONSULT NOTE ADULT - SUBJECTIVE AND OBJECTIVE BOX
Chief Complaint:  Patient is a 53y old  Male who presents with a chief complaint of     HPI:  This is a 53 year old man with history of PE on Eliquis, DM, HTN, WTC , GERD s/p fundoplication who presented for elective G-POEM procedure for gastroparesis. The procedure was completed today 3/8/19 without any complications. Post procedure the patient experienced severe chest pain and the decision was made to admit. The chest pain was not related to inspiration. There was no abdominal pain, n/v, shortness of breath. VS were unremarkable and CXR and ECG were unchanged compared to prior studies.  The patient received hydromorphone 0.5 with significant improved.    Allergies:  No Known Allergies      Home Medications:    Hospital Medications:      PMHX/PSHX:  Gastroparesis  Insomnia  PE (pulmonary thromboembolism)  Depression  Shoulder pain, left  Rotator cuff tear, left  Intervertebral disc disorder  Osteoarthritis  Low testosterone in male  Anxiety  Diabetes mellitus  Hiatal hernia  GERD (gastroesophageal reflux disease)  Asthma  Reactive airway disease  HTN (hypertension)  H/O hiatal hernia  S/P tonsillectomy and adenoidectomy  S/P carpal tunnel release  S/P Achilles tendon repair  S/P laparoscopic fundoplication  H/O submucous nasal surgery      Family history:  Family history of prostate cancer  Family history of other heart disease  Family history of diabetes mellitus      Social History:     ROS:     General:  No wt loss, fevers, chills, night sweats, fatigue,   Eyes:  Good vision, no reported pain  ENT:  No sore throat, pain, runny nose, dysphagia  CV:  No pain, palpitations, hypo/hypertension  Resp:  No dyspnea, cough, tachypnea, wheezing  GI:  See HPI  :  No pain, bleeding, incontinence, nocturia  Muscle:  No pain, weakness  Neuro:  No weakness, tingling, memory problems  Psych:  No fatigue, insomnia, mood problems, depression  Endocrine:  No polyuria, polydipsia, cold/heat intolerance  Heme:  No petechiae, ecchymosis, easy bruisability  Skin:  No rash, edema      PHYSICAL EXAM:     GENERAL:  Appears stated age, well-groomed, well-nourished, no distress  HEENT:  NC/AT,  conjunctivae clear and pink,  no JVD  CHEST:  Full & symmetric excursion, no increased effort, breath sounds clear  HEART:  Regular rhythm, S1, S2, no murmur/rub/S3/S4, no abdominal bruit, no edema  ABDOMEN:  Soft, non-tender, non-distended, normoactive bowel sounds,  no masses , no hepatosplenomegaly  EXTREMITIES:  no cyanosis,clubbing or edema  SKIN:  No rash/erythema/ecchymoses/petechiae/wounds/abscess/warm/dry  NEURO:  Alert, oriented    Vital Signs:  Vital Signs Last 24 Hrs  T(C): --  T(F): --  HR: --  BP: --  BP(mean): --  RR: --  SpO2: --  Daily     Daily     LABS:                    Imaging:

## 2019-03-08 NOTE — H&P ADULT - ASSESSMENT
Th patient is a 53 Y.O. male with pmh of severe GERD, gastroparesis s/p fundoplication x2,, dysphagia, PE in 7/2018 on eliquis, long history of non-cardiac chest pain who presented for a POEM procedure complicated by acute chest/abdominal pain post endoscopy.

## 2019-03-08 NOTE — H&P ADULT - NSHPPHYSICALEXAM_GEN_ALL_CORE
PHYSICAL EXAM:    Constitutional: well-developed; well-groomed; well-nourished; no distress,  Eyes: PERRL; EOMI  ENMT: Normal oropharnxy, no oral lesions, no erythema, no exudates  Neck:  Supple; no JVD; normal thyroid gland  MSK/Back: normal shape; ROM intact; strength intact, no CVA tenderness. Normal strength in all ext, No joint swelling, no joint tenderenss, no joint erythema, no effusions  Respiratory: airway patent; breath sounds equal; good air movement, no wheezing, no crackes, no rhonchi. no increase in WOB  Cardiovascular: regular rate and rhythm  no rub , no murmur, no gallops. Chest wall non-tender  Gastrointestinal: soft; no distention, normal BS, tender to palpation in the mid-epigastrics, no rebound, no guarding, no mass, no organomegaly, no ascites.  Extremities: no clubbing; no cyanosis; no pedal edema, non-tender to palpation, DP and Radial pulses intact.  Neurological: alert and oriented x 3; responds to pain; responds to verbal commands; sensation intact: CN nerves grossly intact.   Skin: warm and dry; color normal: no rash: no ulcers  Psychiatric: Calm, no SI/HI VS: Afebrile, HR 80's, /82, Sats 100 RR 16  PHYSICAL EXAM:    Constitutional: well-developed; well-groomed; well-nourished; no distress,  Eyes: PERRL; EOMI  ENMT: Normal oropharnxy, no oral lesions, no erythema, no exudates  Neck:  Supple; no JVD; normal thyroid gland  MSK/Back: normal shape; ROM intact; strength intact, no CVA tenderness. Normal strength in all ext, No joint swelling, no joint tenderenss, no joint erythema, no effusions  Respiratory: airway patent; breath sounds equal; good air movement, no wheezing, no crackes, no rhonchi. no increase in WOB  Cardiovascular: regular rate and rhythm  no rub , no murmur, no gallops. Chest wall non-tender  Gastrointestinal: soft; no distention, normal BS, tender to palpation in the mid-epigastrics, no rebound, no guarding, no mass, no organomegaly, no ascites.  Extremities: no clubbing; no cyanosis; no pedal edema, non-tender to palpation, DP and Radial pulses intact.  Neurological: alert and oriented x 3; responds to pain; responds to verbal commands; sensation intact: CN nerves grossly intact.   Skin: warm and dry; color normal: no rash: no ulcers  Psychiatric: Calm, no SI/HI

## 2019-03-08 NOTE — H&P ADULT - PROBLEM SELECTOR PLAN 1
Long history of atypical chest pain/abn pain. EKG nondiagnostic, still having pain. CXR clear. Recent negative C/W in OCT, not likely cardiac in origin.   - admit to medicine, serial abn exam  - Check Cristofer,  - HD stable, no need for tele at this time. Will reconsider after CE's.

## 2019-03-08 NOTE — H&P ADULT - NSHPLABSRESULTS_GEN_ALL_CORE
Labs: Ordered, pending blood draw  EKG : Personally reviewed. No change from previous ekg. NSR, no ST segment changes.   CXR: Personally review. Clear lungs on my read.

## 2019-03-08 NOTE — H&P ADULT - PROBLEM SELECTOR PLAN 2
History of PE, unclear if provoked or non-provoked, A/c has been on hold for 2 days. Chest pain non-pleuritic.   Restart in 48 hours post procedure.

## 2019-03-08 NOTE — H&P ADULT - HISTORY OF PRESENT ILLNESS
Th patient is a 53 Y.O. male with pmh of severe GERD, gastroparesis s/p fundoplication x2,, dysphagia, PE in 7/2018 on eliquis, long history of non-cardiac chest pain who presented for a POEM procedure complicated by acute chest/abdominal pain post endoscopy.     Patient states he was in his usual state of health prior to procedure today. Has long history of atypical chest pain, recent cardiac workup with normal ECHO and Nuclear stress outpatient in Oct of this year. States he woke up with sharp, non-pleuritic pain in the epigastric and substernal area. Minimally better. No radiation, no n/v/ diphoresis or associated SOB. Pain is currently 7/10 but unlike any other pain he has had. Has not yet pass gas post procedure. EKG performed was non-diagnotic, CXR, clear on my review. Was admitted to medicine for w/u chest pain and observation.

## 2019-03-09 ENCOUNTER — TRANSCRIPTION ENCOUNTER (OUTPATIENT)
Age: 54
End: 2019-03-09

## 2019-03-09 VITALS
OXYGEN SATURATION: 100 % | RESPIRATION RATE: 18 BRPM | TEMPERATURE: 99 F | SYSTOLIC BLOOD PRESSURE: 127 MMHG | HEART RATE: 98 BPM | DIASTOLIC BLOOD PRESSURE: 79 MMHG

## 2019-03-09 LAB
HCV AB S/CO SERPL IA: 0.11 S/CO — SIGNIFICANT CHANGE UP (ref 0–0.79)
HCV AB SERPL-IMP: SIGNIFICANT CHANGE UP

## 2019-03-09 PROCEDURE — 99239 HOSP IP/OBS DSCHRG MGMT >30: CPT

## 2019-03-09 PROCEDURE — 99232 SBSQ HOSP IP/OBS MODERATE 35: CPT | Mod: GC

## 2019-03-09 RX ORDER — DULAGLUTIDE 4.5 MG/.5ML
0.5 INJECTION, SOLUTION SUBCUTANEOUS
Qty: 0 | Refills: 0 | COMMUNITY

## 2019-03-09 RX ORDER — FLUTICASONE PROPIONATE AND SALMETEROL 50; 250 UG/1; UG/1
1 POWDER ORAL; RESPIRATORY (INHALATION)
Qty: 0 | Refills: 0 | COMMUNITY

## 2019-03-09 RX ORDER — ZOLPIDEM TARTRATE 10 MG/1
2 TABLET ORAL
Qty: 2 | Refills: 0
Start: 2019-03-09

## 2019-03-09 RX ORDER — TIOTROPIUM BROMIDE 18 UG/1
1 CAPSULE ORAL; RESPIRATORY (INHALATION)
Qty: 0 | Refills: 0 | COMMUNITY

## 2019-03-09 RX ORDER — ERGOCALCIFEROL 1.25 MG/1
1 CAPSULE ORAL
Qty: 0 | Refills: 0 | COMMUNITY

## 2019-03-09 RX ORDER — FLUOXETINE HCL 10 MG
1 CAPSULE ORAL
Qty: 0 | Refills: 0 | DISCHARGE
Start: 2019-03-09

## 2019-03-09 RX ORDER — FLUOXETINE HCL 10 MG
1 CAPSULE ORAL
Qty: 0 | Refills: 0 | COMMUNITY

## 2019-03-09 RX ORDER — LINACLOTIDE 145 UG/1
1 CAPSULE, GELATIN COATED ORAL
Qty: 0 | Refills: 0 | COMMUNITY

## 2019-03-09 RX ORDER — METOPROLOL TARTRATE 50 MG
1 TABLET ORAL
Qty: 0 | Refills: 0 | COMMUNITY

## 2019-03-09 RX ORDER — METFORMIN HYDROCHLORIDE 850 MG/1
1 TABLET ORAL
Qty: 0 | Refills: 0 | COMMUNITY

## 2019-03-09 RX ORDER — ALBUTEROL 90 UG/1
2 AEROSOL, METERED ORAL
Qty: 0 | Refills: 0 | COMMUNITY

## 2019-03-09 RX ADMIN — Medication 50 MILLIGRAM(S): at 05:43

## 2019-03-09 RX ADMIN — SODIUM CHLORIDE 60 MILLILITER(S): 9 INJECTION, SOLUTION INTRAVENOUS at 08:58

## 2019-03-09 RX ADMIN — Medication 20 MILLIGRAM(S): at 12:40

## 2019-03-09 RX ADMIN — PANTOPRAZOLE SODIUM 40 MILLIGRAM(S): 20 TABLET, DELAYED RELEASE ORAL at 08:58

## 2019-03-09 RX ADMIN — BUDESONIDE AND FORMOTEROL FUMARATE DIHYDRATE 2 PUFF(S): 160; 4.5 AEROSOL RESPIRATORY (INHALATION) at 08:58

## 2019-03-09 RX ADMIN — TIOTROPIUM BROMIDE 1 CAPSULE(S): 18 CAPSULE ORAL; RESPIRATORY (INHALATION) at 09:00

## 2019-03-09 NOTE — PROGRESS NOTE ADULT - SUBJECTIVE AND OBJECTIVE BOX
Patient is a 53y old  Male who presents with a chief complaint of Chest pain (08 Mar 2019 19:44)    HPI: Pt feels well. Denies pain.     Vital Signs Last 24 Hrs  T(C): 36.7 (09 Mar 2019 05:41), Max: 36.7 (09 Mar 2019 05:41)  T(F): 98.1 (09 Mar 2019 05:41), Max: 98.1 (09 Mar 2019 05:41)  HR: 86 (09 Mar 2019 05:41) (80 - 86)  BP: 115/81 (09 Mar 2019 05:41) (115/81 - 146/95)  BP(mean): --  RR: 18 (09 Mar 2019 05:41) (18 - 18)  SpO2: 100% (09 Mar 2019 05:41) (100% - 100%)                          15.1   8.77  )-----------( 226      ( 08 Mar 2019 21:25 )             45.9     03-08    142  |  103  |  10  ----------------------------<  119<H>  4.0   |  27  |  1.13    Ca    9.6      08 Mar 2019 21:25    TPro  7.8  /  Alb  4.4  /  TBili  0.7  /  DBili  x   /  AST  30  /  ALT  45<H>  /  AlkPhos  66  03-08    MEDICATIONS  (STANDING):  buDESOnide 160 MICROgram(s)/formoterol 4.5 MICROgram(s) Inhaler 2 Puff(s) Inhalation two times a day  dextrose 5%. 1000 milliLiter(s) (50 mL/Hr) IV Continuous <Continuous>  dextrose 50% Injectable 12.5 Gram(s) IV Push once  FLUoxetine 20 milliGRAM(s) Oral daily  insulin lispro (HumaLOG) corrective regimen sliding scale   SubCutaneous every 6 hours  lactated ringers. 1000 milliLiter(s) (60 mL/Hr) IV Continuous <Continuous>  levoFLOXacin  Tablet 500 milliGRAM(s) Oral every 24 hours  metoprolol succinate ER 50 milliGRAM(s) Oral daily  pantoprazole  Injectable 40 milliGRAM(s) IV Push two times a day  tiotropium 18 MICROgram(s) Capsule 1 Capsule(s) Inhalation daily    MEDICATIONS  (PRN):  ALBUTerol    90 MICROgram(s) HFA Inhaler 1 Puff(s) Inhalation four times a day PRN Shortness of Breath and/or Wheezing  dextrose 40% Gel 15 Gram(s) Oral once PRN Blood Glucose LESS THAN 70 milliGRAM(s)/deciliter  glucagon  Injectable 1 milliGRAM(s) IntraMuscular once PRN Glucose LESS THAN 70 milligrams/deciliter  zolpidem 5 milliGRAM(s) Oral at bedtime PRN Insomnia  zolpidem 5 milliGRAM(s) Oral at bedtime PRN Insomnia

## 2019-03-09 NOTE — PROGRESS NOTE ADULT - SUBJECTIVE AND OBJECTIVE BOX
Chief Complaint:  Patient is a 53y old  Male who presents with a chief complaint of Chest pain (09 Mar 2019 14:33)    Interval Events:   No acute overnight events. Patient complains of hiccups. Denies chest pain     Allergies:  No Known Allergies    Hospital Medications:  ALBUTerol    90 MICROgram(s) HFA Inhaler 1 Puff(s) Inhalation four times a day PRN  buDESOnide 160 MICROgram(s)/formoterol 4.5 MICROgram(s) Inhaler 2 Puff(s) Inhalation two times a day  dextrose 40% Gel 15 Gram(s) Oral once PRN  dextrose 5%. 1000 milliLiter(s) IV Continuous <Continuous>  dextrose 50% Injectable 12.5 Gram(s) IV Push once  FLUoxetine 20 milliGRAM(s) Oral daily  glucagon  Injectable 1 milliGRAM(s) IntraMuscular once PRN  insulin lispro (HumaLOG) corrective regimen sliding scale   SubCutaneous every 6 hours  lactated ringers. 1000 milliLiter(s) IV Continuous <Continuous>  levoFLOXacin  Tablet 500 milliGRAM(s) Oral every 24 hours  metoprolol succinate ER 50 milliGRAM(s) Oral daily  pantoprazole  Injectable 40 milliGRAM(s) IV Push two times a day  tiotropium 18 MICROgram(s) Capsule 1 Capsule(s) Inhalation daily  zolpidem 5 milliGRAM(s) Oral at bedtime PRN  zolpidem 5 milliGRAM(s) Oral at bedtime PRN    PMHX/PSHX:  Gastroparesis  Insomnia  PE (pulmonary thromboembolism)  Depression  Shoulder pain, left  Rotator cuff tear, left  Intervertebral disc disorder  Osteoarthritis  Low testosterone in male  Anxiety  Diabetes mellitus  Hiatal hernia  GERD (gastroesophageal reflux disease)  Asthma  Reactive airway disease  HTN (hypertension)  H/O hiatal hernia  S/P tonsillectomy and adenoidectomy  S/P carpal tunnel release  S/P Achilles tendon repair  S/P laparoscopic fundoplication  H/O submucous nasal surgery    ROS:   General:  No fevers, chills   ENT:  No sore throat or dysphagia  CV:  No pain or palpitations  Resp:  No dyspnea, cough or  wheezing  GI:  No pain, No nausea, , No rectal bleeding, No tarry stools,  Skin:  No rash or edema    PHYSICAL EXAM:   Vital Signs:  Vital Signs Last 24 Hrs  T(C): 37.2 (09 Mar 2019 14:29), Max: 37.2 (09 Mar 2019 14:29)  T(F): 99 (09 Mar 2019 14:29), Max: 99 (09 Mar 2019 14:29)  HR: 98 (09 Mar 2019 14:29) (80 - 98)  BP: 127/79 (09 Mar 2019 14:29) (115/81 - 146/95)  BP(mean): --  RR: 18 (09 Mar 2019 14:29) (18 - 18)  SpO2: 100% (09 Mar 2019 14:29) (100% - 100%)  Daily Height in cm: 182.88 (09 Mar 2019 05:41)    Daily     GENERAL:  NAD, Appears stated age  HEENT:  NC/AT,  conjunctivae clear and pink, sclera -anicteric  CHEST:  Normal Effort, Breath sounds clear  HEART:  RRR, S1 + S2, no murmurs  ABDOMEN:  Soft, non-tender, non-distended BS+  EXTEREMITIES:  no cyanosis or edema  SKIN:  Warm & Dry.  NEURO:  Alert, oriented    LABS:                        15.1   8.77  )-----------( 226      ( 08 Mar 2019 21:25 )             45.9     Mean Cell Volume: 90.9 fL (03-08-19 @ 21:25)    03-08    142  |  103  |  10  ----------------------------<  119<H>  4.0   |  27  |  1.13    Ca    9.6      08 Mar 2019 21:25    TPro  7.8  /  Alb  4.4  /  TBili  0.7  /  DBili  x   /  AST  30  /  ALT  45<H>  /  AlkPhos  66  03-08    LIVER FUNCTIONS - ( 08 Mar 2019 21:25 )  Alb: 4.4 g/dL / Pro: 7.8 g/dL / ALK PHOS: 66 u/L / ALT: 45 u/L / AST: 30 u/L / GGT: x           Imaging:

## 2019-03-09 NOTE — PROGRESS NOTE ADULT - PROBLEM SELECTOR PLAN 2
History of PE, unclear if provoked or non-provoked, A/c has been on hold for 2 days. Chest pain non-pleuritic.   Restart in 48 hours post procedure- tomorrow.

## 2019-03-09 NOTE — DISCHARGE NOTE PROVIDER - INSTRUCTIONS
Clear liquid diet for 72 hrs post procedure (Until Sunday)  Full liquid diet for 72 hrs (Until Wednesday)

## 2019-03-09 NOTE — PROGRESS NOTE ADULT - ATTENDING COMMENTS
Chest pain resolved. Ambulatory. Okay to discharge if tolerating CLD. Remainder of plan as above.
D/c planning if tolerates clears.

## 2019-03-09 NOTE — DISCHARGE NOTE NURSING/CASE MANAGEMENT/SOCIAL WORK - NSDCDPATPORTLINK_GEN_ALL_CORE
You can access the [facility] Patient Portal, offered by [facility], by registering with the following website: [email or web address]

## 2019-03-09 NOTE — PROGRESS NOTE ADULT - ASSESSMENT
Impression:  # Gastroparesis s/p G-POEM  # Chest pain: Resolved, suspect secondary to intubation vs irritation from procedure  # PE (Takes Eliquis)    Recommendation:  - Advance to CLD for 72 hours, followed by full liquids for 72 hours  - Levaquin 500 mg PO daily for 7 days  - IV PPI 40 mg BID  - Elevated head of bed  - Hold Eliquis for 24 more hours  - Supportive care per primary team    Emma Bell MD  Gastroenterology Fellow  [phone]  Please page on call fellow on weekends and after 5pm on weekdays Impression:  # Gastroparesis s/p G-POEM  # Chest pain: Resolved, suspect secondary to intubation vs irritation from procedure  # PE (Takes Eliquis)    Recommendation:  - Advance to CLD for 72 hours, followed by full liquids for 72 hours  - Levaquin 500 mg PO daily for 7 days  - PPI 40 mg BID  - Elevated head of bed  - Hold Eliquis for 24 more hours  - Supportive care per primary team    Emma Bell MD  Gastroenterology Fellow  [phone]  Please page on call fellow on weekends and after 5pm on weekdays

## 2019-03-09 NOTE — DISCHARGE NOTE PROVIDER - HOSPITAL COURSE
53 Y.O. male with pmh of severe GERD, gastroparesis s/p fundoplication x2,, dysphagia, PE in 7/2018 on eliquis, long history of non-cardiac chest pain who presented for a POEM procedure complicated by acute chest/abdominal pain post endoscopy.                 Hospital Course     Atypical chest pain.       Long history of atypical chest pain/abn pain. Pain now resolved.     CXR clear. Recent negative C/W in OCT, not likely cardiac in origin.             PE (pulmonary thromboembolism).      History of PE, unclear if provoked or non-provoked, A/c has been on hold for 2 days. Chest pain non-pleuritic.     Restart in 48 hours post procedure- tomorrow.         Dysphagia.     s/p POEM procedure    IV PPI BID    Advance to clears today and continue for 72 hours followed by Full liquid diet for 72 hours.     Levaquin 500 PO daily for 7 days    elevated head of bed.             Essential hypertension.     Stated has been off of lisinopril and norvasc. BP lower with recent wt loss.     C/W metoprolol.         Asthma.     stable, controlled,     C/w home bronchodilators, ICS, LAMA and PRN albuterol.         Diabetes mellitus.     On ISS, FS's,    Re-adjust with advancing diet.        Insomnia.       Hx of PTSD and insomnia.     C/W with zolpidem.         Depression.       psychiatrically stable,     c/w fluoxitine.         Prophylactic measure.     Holding A/C post procedure: Restart in 24 hours    DVT ppx with SCD's.             Dispo: Home

## 2019-03-09 NOTE — PROGRESS NOTE ADULT - PROBLEM SELECTOR PLAN 3
s/p POEM procedure  IV PPI BID  Advance to clears today and continue for 72 hours followed by Full liquid diet for 72 hours.   Levaquin 500 PO daily for 7 days  elevated head of bed.

## 2019-03-09 NOTE — DISCHARGE NOTE PROVIDER - CARE PROVIDER_API CALL
Angel Lindo (MD)  Gastroenterology; Internal Medicine  [address] [place] [address]  Phone: [phone]  Fax: [phone]  Follow Up Time:

## 2019-03-09 NOTE — DISCHARGE NOTE PROVIDER - NSDCCPCAREPLAN_GEN_ALL_CORE_FT
PRINCIPAL DISCHARGE DIAGNOSIS  Problem: Atypical chest pain  Assessment and Plan of Treatment: Resolved  Cardiac Enzymes negative      SECONDARY DISCHARGE DIAGNOSES  Problem: PE (pulmonary thromboembolism)  Assessment and Plan of Treatment: You can start your Eliquis on Sunday.    Problem: Dysphagia  Assessment and Plan of Treatment: Follow up with your Gastrenterologist in 1 week. Call for an appointment  Patient tolerated clear liquid  Take antibiotics as prescribed    Problem: Essential hypertension  Assessment and Plan of Treatment: Low salt diet  Activity as tolerated.  Take all medication as prescribed.  Follow up with your medical doctor for routine blood pressure monitoring at your next visit.  Notify your doctor if you have any of the following symptoms:   Dizziness, Lightheadedness, Blurry vision, Headache, Chest pain, Shortness of breath    Problem: Asthma  Assessment and Plan of Treatment: Continue taking your Advair and Spiriva    Problem: Diabetes mellitus  Assessment and Plan of Treatment: Continue taking your metformin    Problem: Insomnia  Assessment and Plan of Treatment: Continue taking your Ambien as needed at night for insomnia    Problem: Depression  Assessment and Plan of Treatment: Continue taking your Prozac

## 2019-03-09 NOTE — PROGRESS NOTE ADULT - PROBLEM SELECTOR PLAN 1
Long history of atypical chest pain/abn pain. Pain now resolved.     CXR clear. Recent negative C/W in OCT, not likely cardiac in origin.

## 2019-03-16 ENCOUNTER — TRANSCRIPTION ENCOUNTER (OUTPATIENT)
Age: 54
End: 2019-03-16

## 2019-03-29 LAB
BASOPHILS # BLD AUTO: 0.01 K/UL
BASOPHILS NFR BLD AUTO: 0.2 %
EOSINOPHIL # BLD AUTO: 0.16 K/UL
EOSINOPHIL NFR BLD AUTO: 2.5 %
HCT VFR BLD CALC: 43.3 %
HGB BLD-MCNC: 14.3 G/DL
IMM GRANULOCYTES NFR BLD AUTO: 0.5 %
LYMPHOCYTES # BLD AUTO: 2.01 K/UL
LYMPHOCYTES NFR BLD AUTO: 31.2 %
MAN DIFF?: NORMAL
MCHC RBC-ENTMCNC: 30.4 PG
MCHC RBC-ENTMCNC: 33 GM/DL
MCV RBC AUTO: 92.1 FL
MONOCYTES # BLD AUTO: 0.51 K/UL
MONOCYTES NFR BLD AUTO: 7.9 %
NEUTROPHILS # BLD AUTO: 3.72 K/UL
NEUTROPHILS NFR BLD AUTO: 57.7 %
PLATELET # BLD AUTO: 255 K/UL
RBC # BLD: 4.7 M/UL
RBC # FLD: 14.3 %
WBC # FLD AUTO: 6.44 K/UL

## 2019-04-11 ENCOUNTER — APPOINTMENT (OUTPATIENT)
Dept: GASTROENTEROLOGY | Facility: CLINIC | Age: 54
End: 2019-04-11
Payer: COMMERCIAL

## 2019-04-11 VITALS
DIASTOLIC BLOOD PRESSURE: 70 MMHG | WEIGHT: 214 LBS | HEIGHT: 72 IN | RESPIRATION RATE: 14 BRPM | SYSTOLIC BLOOD PRESSURE: 120 MMHG | TEMPERATURE: 98.2 F | OXYGEN SATURATION: 97 % | HEART RATE: 86 BPM | BODY MASS INDEX: 28.99 KG/M2

## 2019-04-11 PROCEDURE — 99213 OFFICE O/P EST LOW 20 MIN: CPT

## 2019-04-11 NOTE — PHYSICAL EXAM
[General Appearance - Alert] : alert [General Appearance - In No Acute Distress] : in no acute distress [Sclera] : the sclera and conjunctiva were normal [Outer Ear] : the ears and nose were normal in appearance [PERRL With Normal Accommodation] : pupils were equal in size, round, and reactive to light [Hearing Threshold Finger Rub Not King] : hearing was normal [Neck Appearance] : the appearance of the neck was normal [Respiration, Rhythm And Depth] : normal respiratory rhythm and effort [Heart Sounds] : normal S1 and S2 [Bowel Sounds] : normal bowel sounds [Abdomen Soft] : soft [Abdomen Tenderness] : non-tender [Cervical Lymph Nodes Enlarged Posterior Bilaterally] : posterior cervical [Cervical Lymph Nodes Enlarged Anterior Bilaterally] : anterior cervical [No CVA Tenderness] : no ~M costovertebral angle tenderness [No Spinal Tenderness] : no spinal tenderness [Abnormal Walk] : normal gait [Musculoskeletal - Swelling] : no joint swelling seen [Skin Color & Pigmentation] : normal skin color and pigmentation [] : no rash [No Focal Deficits] : no focal deficits [Oriented To Time, Place, And Person] : oriented to person, place, and time [Impaired Insight] : insight and judgment were intact

## 2019-04-20 NOTE — HISTORY OF PRESENT ILLNESS
[FreeTextEntry1] : 53 year old male with gastroparesis. History of two fundoplications for GERD symptoms and gastric pacemaker by Dr. Herrera. Recently underwent G-POEM. Reports improvement in nausea. Denies abdominal pain, vomiting.

## 2019-04-20 NOTE — ASSESSMENT
[FreeTextEntry1] : Plan to repeat GES 3 months after GPOEM.\par Overall he should continue low residue low roughage diet.

## 2019-04-25 ENCOUNTER — APPOINTMENT (OUTPATIENT)
Dept: SURGERY | Facility: CLINIC | Age: 54
End: 2019-04-25
Payer: COMMERCIAL

## 2019-04-25 VITALS
BODY MASS INDEX: 28.1 KG/M2 | RESPIRATION RATE: 14 BRPM | HEART RATE: 82 BPM | OXYGEN SATURATION: 96 % | SYSTOLIC BLOOD PRESSURE: 130 MMHG | WEIGHT: 212 LBS | DIASTOLIC BLOOD PRESSURE: 82 MMHG | HEIGHT: 73 IN

## 2019-04-25 PROCEDURE — 99213 OFFICE O/P EST LOW 20 MIN: CPT

## 2019-06-18 ENCOUNTER — FORM ENCOUNTER (OUTPATIENT)
Age: 54
End: 2019-06-18

## 2019-06-19 ENCOUNTER — APPOINTMENT (OUTPATIENT)
Dept: NUCLEAR MEDICINE | Facility: HOSPITAL | Age: 54
End: 2019-06-19
Payer: COMMERCIAL

## 2019-06-19 ENCOUNTER — OUTPATIENT (OUTPATIENT)
Dept: OUTPATIENT SERVICES | Facility: HOSPITAL | Age: 54
LOS: 1 days | End: 2019-06-19

## 2019-06-19 DIAGNOSIS — Z98.890 OTHER SPECIFIED POSTPROCEDURAL STATES: Chronic | ICD-10-CM

## 2019-06-19 DIAGNOSIS — Z87.19 PERSONAL HISTORY OF OTHER DISEASES OF THE DIGESTIVE SYSTEM: Chronic | ICD-10-CM

## 2019-06-19 DIAGNOSIS — K31.84 GASTROPARESIS: ICD-10-CM

## 2019-06-19 DIAGNOSIS — Z90.89 ACQUIRED ABSENCE OF OTHER ORGANS: Chronic | ICD-10-CM

## 2019-06-19 PROCEDURE — 78264 GASTRIC EMPTYING IMG STUDY: CPT | Mod: 26

## 2019-07-26 ENCOUNTER — INBOUND DOCUMENT (OUTPATIENT)
Age: 54
End: 2019-07-26

## 2019-10-17 ENCOUNTER — APPOINTMENT (OUTPATIENT)
Dept: GASTROENTEROLOGY | Facility: CLINIC | Age: 54
End: 2019-10-17
Payer: COMMERCIAL

## 2019-10-17 VITALS
OXYGEN SATURATION: 98 % | HEIGHT: 73 IN | BODY MASS INDEX: 26.51 KG/M2 | SYSTOLIC BLOOD PRESSURE: 120 MMHG | WEIGHT: 200 LBS | HEART RATE: 70 BPM | DIASTOLIC BLOOD PRESSURE: 70 MMHG

## 2019-10-17 PROCEDURE — 99214 OFFICE O/P EST MOD 30 MIN: CPT

## 2019-10-25 NOTE — HISTORY OF PRESENT ILLNESS
[FreeTextEntry1] : 54 here for follow up. The patient underwent a G-POEM several months ago He had also undergone a gastric PPM prior to this with some improvement. However, it seems that after GPOEM he has been doing very well. He no longer ha early satiety, nausea or vomiting or bloating. \par \par Needs screening colonoscopy at this time as well. No symptoms- no change in bowel habits. Denies hematochezia, melena. No bloating, abdominal pain or tenesmus.

## 2019-10-25 NOTE — ASSESSMENT
[FreeTextEntry1] : Plan for colonoscopy. otherwise he is doing well post GPOEM. emptying study has normalized. His symptoms also have markedly improved and he is very happy.

## 2019-10-25 NOTE — PHYSICAL EXAM
[General Appearance - In No Acute Distress] : in no acute distress [General Appearance - Alert] : alert [General Appearance - Well Nourished] : well nourished [General Appearance - Well Developed] : well developed [Neck Appearance] : the appearance of the neck was normal [Jugular Venous Distention Increased] : there was no jugular-venous distention [Neck Cervical Mass (___cm)] : no neck mass was observed [Exaggerated Use Of Accessory Muscles For Inspiration] : no accessory muscle use [Heart Sounds] : normal S1 and S2 [Bowel Sounds] : normal bowel sounds [Abdomen Soft] : soft [Abdomen Tenderness] : non-tender [Cervical Lymph Nodes Enlarged Posterior Bilaterally] : posterior cervical [Cervical Lymph Nodes Enlarged Anterior Bilaterally] : anterior cervical [No CVA Tenderness] : no ~M costovertebral angle tenderness [No Spinal Tenderness] : no spinal tenderness [Nail Clubbing] : no clubbing  or cyanosis of the fingernails [] : no rash [No Focal Deficits] : no focal deficits [Oriented To Time, Place, And Person] : oriented to person, place, and time [Impaired Insight] : insight and judgment were intact [Affect] : the affect was normal

## 2019-11-05 ENCOUNTER — RESULT REVIEW (OUTPATIENT)
Age: 54
End: 2019-11-05

## 2019-11-05 ENCOUNTER — OUTPATIENT (OUTPATIENT)
Dept: OUTPATIENT SERVICES | Facility: HOSPITAL | Age: 54
LOS: 1 days | End: 2019-11-05
Payer: COMMERCIAL

## 2019-11-05 ENCOUNTER — APPOINTMENT (OUTPATIENT)
Dept: GASTROENTEROLOGY | Facility: HOSPITAL | Age: 54
End: 2019-11-05

## 2019-11-05 DIAGNOSIS — Z98.890 OTHER SPECIFIED POSTPROCEDURAL STATES: Chronic | ICD-10-CM

## 2019-11-05 DIAGNOSIS — Z90.89 ACQUIRED ABSENCE OF OTHER ORGANS: Chronic | ICD-10-CM

## 2019-11-05 DIAGNOSIS — Z87.19 PERSONAL HISTORY OF OTHER DISEASES OF THE DIGESTIVE SYSTEM: Chronic | ICD-10-CM

## 2019-11-05 DIAGNOSIS — Z12.11 ENCOUNTER FOR SCREENING FOR MALIGNANT NEOPLASM OF COLON: ICD-10-CM

## 2019-11-05 PROCEDURE — 45380 COLONOSCOPY AND BIOPSY: CPT | Mod: PT

## 2019-11-05 PROCEDURE — 45380 COLONOSCOPY AND BIOPSY: CPT | Mod: GC

## 2019-11-05 PROCEDURE — 88305 TISSUE EXAM BY PATHOLOGIST: CPT

## 2019-11-05 PROCEDURE — 88305 TISSUE EXAM BY PATHOLOGIST: CPT | Mod: 26

## 2019-11-05 PROCEDURE — 82962 GLUCOSE BLOOD TEST: CPT

## 2019-11-07 ENCOUNTER — APPOINTMENT (OUTPATIENT)
Dept: SURGERY | Facility: CLINIC | Age: 54
End: 2019-11-07
Payer: COMMERCIAL

## 2019-11-07 VITALS
SYSTOLIC BLOOD PRESSURE: 135 MMHG | BODY MASS INDEX: 27.04 KG/M2 | HEART RATE: 68 BPM | DIASTOLIC BLOOD PRESSURE: 85 MMHG | OXYGEN SATURATION: 99 % | WEIGHT: 204 LBS | HEIGHT: 73 IN

## 2019-11-07 PROCEDURE — 99213 OFFICE O/P EST LOW 20 MIN: CPT

## 2019-11-07 RX ORDER — METOPROLOL SUCCINATE 50 MG/1
50 TABLET, EXTENDED RELEASE ORAL
Qty: 30 | Refills: 0 | Status: ACTIVE | COMMUNITY
Start: 2019-06-17

## 2019-11-07 RX ORDER — VORTIOXETINE 10 MG/1
10 TABLET, FILM COATED ORAL
Qty: 30 | Refills: 0 | Status: ACTIVE | COMMUNITY
Start: 2019-10-16

## 2020-01-07 ENCOUNTER — RX RENEWAL (OUTPATIENT)
Age: 55
End: 2020-01-07

## 2020-03-31 ENCOUNTER — EMERGENCY (EMERGENCY)
Facility: HOSPITAL | Age: 55
LOS: 0 days | Discharge: HOME | End: 2020-03-31
Payer: MEDICARE

## 2020-03-31 VITALS
DIASTOLIC BLOOD PRESSURE: 78 MMHG | HEART RATE: 93 BPM | OXYGEN SATURATION: 100 % | TEMPERATURE: 99 F | SYSTOLIC BLOOD PRESSURE: 128 MMHG | RESPIRATION RATE: 18 BRPM

## 2020-03-31 DIAGNOSIS — R50.9 FEVER, UNSPECIFIED: ICD-10-CM

## 2020-03-31 DIAGNOSIS — B33.8 OTHER SPECIFIED VIRAL DISEASES: ICD-10-CM

## 2020-03-31 DIAGNOSIS — R43.0 ANOSMIA: ICD-10-CM

## 2020-03-31 DIAGNOSIS — Z87.19 PERSONAL HISTORY OF OTHER DISEASES OF THE DIGESTIVE SYSTEM: Chronic | ICD-10-CM

## 2020-03-31 DIAGNOSIS — R05 COUGH: ICD-10-CM

## 2020-03-31 DIAGNOSIS — Z98.890 OTHER SPECIFIED POSTPROCEDURAL STATES: Chronic | ICD-10-CM

## 2020-03-31 DIAGNOSIS — Z79.84 LONG TERM (CURRENT) USE OF ORAL HYPOGLYCEMIC DRUGS: ICD-10-CM

## 2020-03-31 DIAGNOSIS — B97.29 OTHER CORONAVIRUS AS THE CAUSE OF DISEASES CLASSIFIED ELSEWHERE: ICD-10-CM

## 2020-03-31 DIAGNOSIS — J84.9 INTERSTITIAL PULMONARY DISEASE, UNSPECIFIED: ICD-10-CM

## 2020-03-31 DIAGNOSIS — Z79.01 LONG TERM (CURRENT) USE OF ANTICOAGULANTS: ICD-10-CM

## 2020-03-31 DIAGNOSIS — Z90.89 ACQUIRED ABSENCE OF OTHER ORGANS: Chronic | ICD-10-CM

## 2020-03-31 PROCEDURE — 87635 SARS-COV-2 COVID-19 AMP PRB: CPT

## 2020-03-31 PROCEDURE — 99282 EMERGENCY DEPT VISIT SF MDM: CPT

## 2020-03-31 PROCEDURE — 99283 EMERGENCY DEPT VISIT LOW MDM: CPT

## 2020-03-31 NOTE — ED ADULT NURSE NOTE - OBJECTIVE STATEMENT
PATIENT WAS TREATED, EVALUATED AND DISCHARGED BY INTAKE PROVIDER. PLEASE SEE PROVIDER NOTE FOR ASSESSMENT.  DISCHARGE INSTRUCTIONS REVIEWED WITH PATIENT VERBALLY, PT VERBALIZED UNDERSTANDING OF DISCHARGE INSTRUCTIONS. PAPER COPY OF DISCHARGE INSTRUCTIONS GIVEN TO PATIENT WITH SELF MARYJO AND COVID 19 INFORMATION.

## 2020-03-31 NOTE — ED STATDOCS - OBJECTIVE STATEMENT
Pt with PMH of interstitial lung disease presents to ED with fever 102.7, cough, body aches, anosmia x 7 days. Pt has been using tylenol at home. Wife is RN who has had confirmed +exposure to COVID patient. Pt unknown if recently exposed to COVID-19 directly. Pt here for testing.

## 2020-03-31 NOTE — ED STATDOCS - PATIENT PORTAL LINK FT
You can access the FollowMyHealth Patient Portal offered by [facility] by registering at the following website: [email or web address]. By joining "[employer]’s FollowMyHealth portal, you will also be able to view your health information using other applications (apps) compatible with our system.

## 2020-04-02 LAB — SARS-COV-2 RNA SPEC QL NAA+PROBE: (no result)

## 2020-12-31 DIAGNOSIS — Z01.818 ENCOUNTER FOR OTHER PREPROCEDURAL EXAMINATION: ICD-10-CM

## 2021-01-01 ENCOUNTER — APPOINTMENT (OUTPATIENT)
Dept: DISASTER EMERGENCY | Facility: CLINIC | Age: 56
End: 2021-01-01

## 2021-01-02 LAB — SARS-COV-2 N GENE NPH QL NAA+PROBE: NOT DETECTED

## 2021-01-04 ENCOUNTER — RESULT REVIEW (OUTPATIENT)
Age: 56
End: 2021-01-04

## 2021-01-04 ENCOUNTER — OUTPATIENT (OUTPATIENT)
Dept: OUTPATIENT SERVICES | Facility: HOSPITAL | Age: 56
LOS: 1 days | Discharge: HOME | End: 2021-01-04
Payer: COMMERCIAL

## 2021-01-04 ENCOUNTER — APPOINTMENT (OUTPATIENT)
Dept: GASTROENTEROLOGY | Facility: HOSPITAL | Age: 56
End: 2021-01-04

## 2021-01-04 VITALS
OXYGEN SATURATION: 97 % | HEART RATE: 79 BPM | SYSTOLIC BLOOD PRESSURE: 123 MMHG | RESPIRATION RATE: 20 BRPM | DIASTOLIC BLOOD PRESSURE: 78 MMHG

## 2021-01-04 VITALS
TEMPERATURE: 98 F | DIASTOLIC BLOOD PRESSURE: 72 MMHG | SYSTOLIC BLOOD PRESSURE: 113 MMHG | HEART RATE: 66 BPM | OXYGEN SATURATION: 100 % | RESPIRATION RATE: 18 BRPM

## 2021-01-04 DIAGNOSIS — Z90.89 ACQUIRED ABSENCE OF OTHER ORGANS: Chronic | ICD-10-CM

## 2021-01-04 DIAGNOSIS — Z98.890 OTHER SPECIFIED POSTPROCEDURAL STATES: Chronic | ICD-10-CM

## 2021-01-04 DIAGNOSIS — Z87.19 PERSONAL HISTORY OF OTHER DISEASES OF THE DIGESTIVE SYSTEM: Chronic | ICD-10-CM

## 2021-01-04 DIAGNOSIS — K31.84 GASTROPARESIS: ICD-10-CM

## 2021-01-04 LAB
GLUCOSE BLDC GLUCOMTR-MCNC: 92 MG/DL — SIGNIFICANT CHANGE UP (ref 70–99)
GLUCOSE BLDC GLUCOMTR-MCNC: 93 MG/DL — SIGNIFICANT CHANGE UP (ref 70–99)

## 2021-01-04 PROCEDURE — 88305 TISSUE EXAM BY PATHOLOGIST: CPT | Mod: 26

## 2021-01-04 PROCEDURE — 88312 SPECIAL STAINS GROUP 1: CPT | Mod: 26

## 2021-01-04 PROCEDURE — 43239 EGD BIOPSY SINGLE/MULTIPLE: CPT

## 2021-01-04 RX ORDER — SODIUM CHLORIDE 9 MG/ML
1000 INJECTION, SOLUTION INTRAVENOUS
Refills: 0 | Status: DISCONTINUED | OUTPATIENT
Start: 2021-01-04 | End: 2021-01-18

## 2021-01-04 RX ADMIN — SODIUM CHLORIDE 30 MILLILITER(S): 9 INJECTION, SOLUTION INTRAVENOUS at 12:02

## 2021-01-07 LAB — SURGICAL PATHOLOGY STUDY: SIGNIFICANT CHANGE UP

## 2021-01-14 ENCOUNTER — APPOINTMENT (OUTPATIENT)
Dept: GASTROENTEROLOGY | Facility: CLINIC | Age: 56
End: 2021-01-14
Payer: COMMERCIAL

## 2021-01-14 VITALS
BODY MASS INDEX: 26.41 KG/M2 | SYSTOLIC BLOOD PRESSURE: 98 MMHG | HEIGHT: 72 IN | DIASTOLIC BLOOD PRESSURE: 60 MMHG | WEIGHT: 195 LBS | TEMPERATURE: 96.4 F | HEART RATE: 68 BPM | OXYGEN SATURATION: 98 %

## 2021-01-14 DIAGNOSIS — B37.81 CANDIDAL ESOPHAGITIS: ICD-10-CM

## 2021-01-14 PROCEDURE — 99214 OFFICE O/P EST MOD 30 MIN: CPT

## 2021-01-14 PROCEDURE — 99072 ADDL SUPL MATRL&STAF TM PHE: CPT

## 2021-01-14 NOTE — HISTORY OF PRESENT ILLNESS
[FreeTextEntry1] : Worsening regurgitation and 10-15 lbs weight loss\par Denies dysphagia\par No abdominal pain\par Mild chronic nausea\par No emesis, or hematemesis. No change in bowel habits. Denies blood in stool. No back pain. No fever or chills. \par Mild candidiasis in esophagus on last exam. Uses advair chronically.

## 2021-01-14 NOTE — ASSESSMENT
[FreeTextEntry1] : Start fluconazole. Advised to drink water after Advair use and to rinse mouth\par Check esophagram and gastric emptying\par Possible evidence of a slipped fundoplication with mild sliding hiatus hernia\par Need to establish whether GP is at its baseline\par Pylorus patulous post GPOEM\par Also needs pacemaker interrogation by surgeon who originally placed it

## 2021-01-26 ENCOUNTER — APPOINTMENT (OUTPATIENT)
Dept: NUCLEAR MEDICINE | Facility: HOSPITAL | Age: 56
End: 2021-01-26
Payer: COMMERCIAL

## 2021-01-26 ENCOUNTER — OUTPATIENT (OUTPATIENT)
Dept: OUTPATIENT SERVICES | Facility: HOSPITAL | Age: 56
LOS: 1 days | End: 2021-01-26

## 2021-01-26 ENCOUNTER — RESULT REVIEW (OUTPATIENT)
Age: 56
End: 2021-01-26

## 2021-01-26 DIAGNOSIS — Z87.19 PERSONAL HISTORY OF OTHER DISEASES OF THE DIGESTIVE SYSTEM: Chronic | ICD-10-CM

## 2021-01-26 DIAGNOSIS — Z98.890 OTHER SPECIFIED POSTPROCEDURAL STATES: Chronic | ICD-10-CM

## 2021-01-26 DIAGNOSIS — K31.84 GASTROPARESIS: ICD-10-CM

## 2021-01-26 DIAGNOSIS — Z90.89 ACQUIRED ABSENCE OF OTHER ORGANS: Chronic | ICD-10-CM

## 2021-01-26 PROCEDURE — 78264 GASTRIC EMPTYING IMG STUDY: CPT | Mod: 26

## 2021-01-29 NOTE — H&P ADULT - NSCORESITESY/N_GEN_A_CORE_RD
No

## 2021-02-09 ENCOUNTER — OUTPATIENT (OUTPATIENT)
Dept: OUTPATIENT SERVICES | Facility: HOSPITAL | Age: 56
LOS: 1 days | End: 2021-02-09

## 2021-02-09 ENCOUNTER — APPOINTMENT (OUTPATIENT)
Dept: RADIOLOGY | Facility: HOSPITAL | Age: 56
End: 2021-02-09
Payer: COMMERCIAL

## 2021-02-09 DIAGNOSIS — K31.84 GASTROPARESIS: ICD-10-CM

## 2021-02-09 DIAGNOSIS — Z87.19 PERSONAL HISTORY OF OTHER DISEASES OF THE DIGESTIVE SYSTEM: Chronic | ICD-10-CM

## 2021-02-09 DIAGNOSIS — Z98.890 OTHER SPECIFIED POSTPROCEDURAL STATES: Chronic | ICD-10-CM

## 2021-02-09 DIAGNOSIS — Z90.89 ACQUIRED ABSENCE OF OTHER ORGANS: Chronic | ICD-10-CM

## 2021-02-09 PROCEDURE — 74220 X-RAY XM ESOPHAGUS 1CNTRST: CPT | Mod: 26

## 2021-03-02 ENCOUNTER — APPOINTMENT (OUTPATIENT)
Dept: GASTROENTEROLOGY | Facility: CLINIC | Age: 56
End: 2021-03-02
Payer: COMMERCIAL

## 2021-03-02 PROCEDURE — 99214 OFFICE O/P EST MOD 30 MIN: CPT | Mod: 95

## 2021-03-02 NOTE — ASSESSMENT
[FreeTextEntry1] : Patient has chronic gastroparesis which was addressed with ELIDA BORDEN 2 years ago. Originally 77% retained solids at 4 hrs and now there is almost no retained solids at 4 hrs. \par It is unlikely that this is the problem and he has two consecutive emptying studies that show the same positive result. \par I have personally reviewed his esophagram and it appears that he has reherniated. The fundoplication is above the diaphragm and in retrospect the patient believes that his symptoms especially the hiccups are consistent with his original hernia presentation.

## 2021-03-02 NOTE — HISTORY OF PRESENT ILLNESS
[Home] : at home, [unfilled] , at the time of the visit. [Medical Office: ([facility])___] : at the medical office located in  [Verbal consent obtained from patient] : the patient, [unfilled] [FreeTextEntry1] : 55 with several months of worsening hiccups and nausea. Mild to moderate regurgitation. All of this is new and he had been doing well post G POEM for gastroparesis. Also reports some delay in passage which can be characterized as dysphagia and this is mostly to solids. No chest spasm. No chest pain. No abdominal pain. No vomiting. Weight has remained stable. Appetite is the same.

## 2021-03-11 ENCOUNTER — APPOINTMENT (OUTPATIENT)
Dept: CT IMAGING | Facility: IMAGING CENTER | Age: 56
End: 2021-03-11
Payer: COMMERCIAL

## 2021-03-11 ENCOUNTER — OUTPATIENT (OUTPATIENT)
Dept: OUTPATIENT SERVICES | Facility: HOSPITAL | Age: 56
LOS: 1 days | End: 2021-03-11
Payer: COMMERCIAL

## 2021-03-11 DIAGNOSIS — Z98.890 OTHER SPECIFIED POSTPROCEDURAL STATES: Chronic | ICD-10-CM

## 2021-03-11 DIAGNOSIS — Z90.89 ACQUIRED ABSENCE OF OTHER ORGANS: Chronic | ICD-10-CM

## 2021-03-11 DIAGNOSIS — K44.9 DIAPHRAGMATIC HERNIA WITHOUT OBSTRUCTION OR GANGRENE: ICD-10-CM

## 2021-03-11 DIAGNOSIS — Z87.19 PERSONAL HISTORY OF OTHER DISEASES OF THE DIGESTIVE SYSTEM: Chronic | ICD-10-CM

## 2021-03-11 PROCEDURE — 71250 CT THORAX DX C-: CPT

## 2021-03-11 PROCEDURE — 71250 CT THORAX DX C-: CPT | Mod: 26,MG

## 2021-03-11 PROCEDURE — G1004: CPT

## 2021-03-18 ENCOUNTER — APPOINTMENT (OUTPATIENT)
Dept: THORACIC SURGERY | Facility: CLINIC | Age: 56
End: 2021-03-18
Payer: COMMERCIAL

## 2021-03-18 VITALS
HEART RATE: 73 BPM | OXYGEN SATURATION: 100 % | WEIGHT: 189 LBS | HEIGHT: 72 IN | TEMPERATURE: 99.1 F | DIASTOLIC BLOOD PRESSURE: 83 MMHG | SYSTOLIC BLOOD PRESSURE: 126 MMHG | BODY MASS INDEX: 25.6 KG/M2 | RESPIRATION RATE: 17 BRPM

## 2021-03-18 DIAGNOSIS — Z86.711 PERSONAL HISTORY OF PULMONARY EMBOLISM: ICD-10-CM

## 2021-03-18 DIAGNOSIS — D64.9 ANEMIA, UNSPECIFIED: ICD-10-CM

## 2021-03-18 DIAGNOSIS — Z86.79 PERSONAL HISTORY OF OTHER DISEASES OF THE CIRCULATORY SYSTEM: ICD-10-CM

## 2021-03-18 DIAGNOSIS — F43.10 POST-TRAUMATIC STRESS DISORDER, UNSPECIFIED: ICD-10-CM

## 2021-03-18 DIAGNOSIS — Z87.19 PERSONAL HISTORY OF OTHER DISEASES OF THE DIGESTIVE SYSTEM: ICD-10-CM

## 2021-03-18 PROCEDURE — 99205 OFFICE O/P NEW HI 60 MIN: CPT

## 2021-03-18 PROCEDURE — 99072 ADDL SUPL MATRL&STAF TM PHE: CPT

## 2021-03-18 RX ORDER — AMLODIPINE BESYLATE 5 MG/1
5 TABLET ORAL
Refills: 0 | Status: DISCONTINUED | COMMUNITY
End: 2021-03-18

## 2021-03-18 RX ORDER — MUPIROCIN 2 G/100G
2 CREAM TOPICAL
Refills: 0 | Status: DISCONTINUED | COMMUNITY
Start: 2017-11-20 | End: 2021-03-18

## 2021-03-18 RX ORDER — POLYETHYLENE GLYCOL 3350, SODIUM SULFATE, SODIUM CHLORIDE, POTASSIUM CHLORIDE, ASCORBIC ACID, SODIUM ASCORBATE 7.5-2.691G
100 KIT ORAL
Qty: 1 | Refills: 0 | Status: DISCONTINUED | COMMUNITY
Start: 2019-10-18 | End: 2021-03-18

## 2021-03-18 RX ORDER — TRIAMCINOLONE ACETONIDE 55 MCG
AEROSOL WITH ADAPTER (GRAM) NASAL DAILY
Refills: 0 | Status: DISCONTINUED | COMMUNITY
End: 2021-03-18

## 2021-03-18 RX ORDER — METFORMIN HYDROCHLORIDE 500 MG/1
500 TABLET, COATED ORAL
Refills: 0 | Status: DISCONTINUED | COMMUNITY
End: 2021-03-18

## 2021-03-18 RX ORDER — FLUOXETINE HYDROCHLORIDE 20 MG/1
20 CAPSULE ORAL
Qty: 60 | Refills: 0 | Status: DISCONTINUED | COMMUNITY
Start: 2017-12-26 | End: 2021-03-18

## 2021-03-18 RX ORDER — MELOXICAM 15 MG/1
15 TABLET ORAL
Qty: 90 | Refills: 0 | Status: DISCONTINUED | COMMUNITY
Start: 2019-08-02 | End: 2021-03-18

## 2021-03-18 RX ORDER — ERGOCALCIFEROL 1.25 MG/1
1.25 MG CAPSULE, LIQUID FILLED ORAL
Refills: 0 | Status: DISCONTINUED | COMMUNITY
End: 2021-03-18

## 2021-03-18 RX ORDER — CLOTRIMAZOLE 10 MG/1
10 LOZENGE ORAL DAILY
Qty: 105 | Refills: 0 | Status: DISCONTINUED | COMMUNITY
Start: 2018-12-10 | End: 2021-03-18

## 2021-03-18 RX ORDER — LISINOPRIL 5 MG/1
5 TABLET ORAL
Refills: 0 | Status: DISCONTINUED | COMMUNITY
End: 2021-03-18

## 2021-03-18 RX ORDER — FLUCONAZOLE 100 MG/1
100 TABLET ORAL
Qty: 30 | Refills: 1 | Status: DISCONTINUED | COMMUNITY
Start: 2021-01-14 | End: 2021-03-18

## 2021-03-18 RX ORDER — METOCLOPRAMIDE 10 MG/1
10 TABLET ORAL 4 TIMES DAILY
Qty: 120 | Refills: 5 | Status: DISCONTINUED | COMMUNITY
Start: 2018-09-07 | End: 2021-03-18

## 2021-03-19 PROBLEM — Z86.711 HISTORY OF PULMONARY EMBOLISM: Status: RESOLVED | Noted: 2021-03-19 | Resolved: 2021-03-19

## 2021-03-19 PROBLEM — D64.9 ANEMIA: Status: ACTIVE | Noted: 2018-05-09

## 2021-03-19 PROBLEM — Z86.79 HISTORY OF HYPERTENSION: Status: RESOLVED | Noted: 2021-03-19 | Resolved: 2021-03-19

## 2021-03-19 PROBLEM — F43.10 POSTTRAUMATIC STRESS DISORDER: Status: ACTIVE | Noted: 2018-07-10

## 2021-03-19 PROBLEM — Z87.19 HISTORY OF BARRETT'S ESOPHAGUS: Status: RESOLVED | Noted: 2021-03-19 | Resolved: 2021-03-19

## 2021-03-19 RX ORDER — ALPRAZOLAM 0.25 MG/1
0.25 TABLET ORAL
Qty: 60 | Refills: 0 | Status: ACTIVE | COMMUNITY
Start: 2021-02-16

## 2021-03-19 RX ORDER — DICLOFENAC SODIUM 1% 10 MG/G
1 GEL TOPICAL
Qty: 200 | Refills: 0 | Status: ACTIVE | COMMUNITY
Start: 2020-11-30

## 2021-03-19 RX ORDER — FLUTICASONE PROPIONATE 50 UG/1
50 SPRAY, METERED NASAL
Qty: 16 | Refills: 0 | Status: ACTIVE | COMMUNITY
Start: 2020-10-05

## 2021-03-19 RX ORDER — TADALAFIL 5 MG/1
5 TABLET ORAL
Qty: 30 | Refills: 0 | Status: ACTIVE | COMMUNITY
Start: 2021-02-16

## 2021-03-19 RX ORDER — ZOLPIDEM TARTRATE 5 MG/1
5 TABLET ORAL
Qty: 30 | Refills: 0 | Status: ACTIVE | COMMUNITY
Start: 2021-02-16

## 2021-03-19 RX ORDER — APIXABAN 5 MG/1
TABLET, FILM COATED ORAL
Refills: 0 | Status: ACTIVE | COMMUNITY

## 2021-03-26 NOTE — HISTORY OF PRESENT ILLNESS
[FreeTextEntry1] : Mr. MARK GARCIA, 55 year old male, 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 Cruz Herrera, s/p G-POEM in 2019, b/l PE on Eliquis, depression, anemia, DM (not on medications), who referred by Dr. Angel Lindo for evaluation of recurrent hiatal hernia. \par \par Patient is s/p hiatal hernia repair, Nissen fundoplication in 2011, and takedown of Nissen fundoplication, repair of recurrent hiatal hernia, revision fundoplication to partial Toupet fundoplication on 01/19/2016 by Dr. Edgard Whaley (General surgeon) at [place]. \par \par EGD on 1/4/21 by Dr. Lindo. Path of gastric antrum showed chronic inactive gastritis, negative H. Pylori. Gastric proximal biopsy showed chronic inactive gastritis, extensive intestinal and Paneth cell metaplasia, negative H. Pylori.\par \par Gastric emptying study on 1/26/21. Normal gastric emptying of liquids and solids; no evidence of gastroparesis.\par \par Barium Esophagram on 2/9/21 showed GE reflux and a small hiatal hernia. \par \par CT Chest on 3/11/21:\par - s/p Nissen fundoplication\par - small, 3.6 cm hiatal hernia\par \par Patient is here today for CT Sx consultation. Patient c/o nausea w/o vomiting, dysphagia to solid food not to liquid, c/o odynophagia occasionally, c/o food regurgitation. Patient denies reflux. He is currently on soft diet, sometimes with severe symptoms, he is only eat one meal a day, more than 30 lbs weight loss over a year period, and recently lost 5 lbs over a week. He is taking Omeprazole with minimal relief. Patient denies shortness of breath, cough, chest pain, fever, chills.

## 2021-03-26 NOTE — CONSULT LETTER
[Consult Letter:] : I had the pleasure of evaluating your patient, [unfilled]. [( Thank you for referring [unfilled] for consultation for _____ )] : Thank you for referring [unfilled] for consultation for [unfilled] [Please see my note below.] : Please see my note below. [Consult Closing:] : Thank you very much for allowing me to participate in the care of this patient.  If you have any questions, please do not hesitate to contact me. [Sincerely,] : Sincerely, [Dear  ___] : Dear  [unfilled], [DrManfred  ___] : Dr. ROCHE [FreeTextEntry2] : Dr. Angel Lindo (GI/Referring)\par Dr. Lucero Del Angel (PCP) [FreeTextEntry3] : Dillon Javier MD, MPH \par System Director of Thoracic Surgery \par Director of Comprehensive Lung and Foregut Onsted \par Professor Cardiovascular & Thoracic Surgery  \par [facility] [employer] [facility] [employer] [facility]\par \par [facility]\par [address]\par Oncology [address]\[place] [address]\par Tel: [phone]\par Fax: [phone]\par

## 2021-03-26 NOTE — ASSESSMENT
[FreeTextEntry1] : Mr. MARK GARCIA, 55 year old male, 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 Cruz Herrera, s/p G-POEM in 2019, b/l PE on Eliquis, depression, anemia, DM (not on medications), who referred by Dr. Angel Lindo for evaluation of recurrent hiatal hernia. \par \par Patient is s/p hiatal hernia repair, Nissen fundoplication in 2011, and takedown of Nissen fundoplication, repair of recurrent hiatal hernia, revision fundoplication to partial Toupet fundoplication on 01/19/2016 by Dr. Edgard Whaley (General surgeon) at [place]. \par \par EGD on 1/4/21 by Dr. Lindo. Path of gastric antrum showed chronic inactive gastritis, negative H. Pylori. Gastric proximal biopsy showed chronic inactive gastritis, extensive intestinal and Paneth cell metaplasia, negative H. Pylori.\par \par Gastric emptying study on 1/26/21. Normal gastric emptying of liquids and solids; no evidence of gastroparesis.\par \par Barium Esophagram on 2/9/21 showed GE reflux and a small hiatal hernia. \par \par CT Chest on 3/11/21:\par - s/p Nissen fundoplication\par - small, 3.6 cm hiatal hernia\par \par I have reviewed the patient's medical records and diagnostic images at time of this office consultation and have made the following recommendation:\par 1. All studies reviewed and explained to patient, patient is very symptomatic, I recommended a EGD, Robotic-assisted, Laparoscopy, repair of recurrent hiatal hernia repair, possible left thoracotomy on 04/12/2021. Risks and benefits and alternatives explained to patient, all questions answered, patient agreed to proceed with surgery.\par 2. Refer patient to Dr. Lissette Fernandez for Manometry. \par 3. Medical clearance and PST. \par 4. Hold Eliquis for 5 days prior to surgery. \par \par \par I personally performed the services described in the documentation, reviewed the documentation recorded by the scribe in my presence and it accurately and completely records my words and actions.\par \par I, Jayla Ruiz NP, am scribing for and the presence of JACEK May, the following sections HISTORY OF PRESENT ILLNESS, PAST MEDICAL/FAMILY/SOCIAL HISTORY; REVIEW OF SYSTEMS; VITAL SIGNS; PHYSICAL EXAM; DISPOSITION.

## 2021-03-26 NOTE — PHYSICAL EXAM
[General Appearance - Alert] : alert [General Appearance - In No Acute Distress] : in no acute distress [Sclera] : the sclera and conjunctiva were normal [PERRL With Normal Accommodation] : pupils were equal in size, round, and reactive to light [Extraocular Movements] : extraocular movements were intact [Outer Ear] : the ears and nose were normal in appearance [Oropharynx] : the oropharynx was normal [Neck Appearance] : the appearance of the neck was normal [Neck Cervical Mass (___cm)] : no neck mass was observed [Jugular Venous Distention Increased] : there was no jugular-venous distention [Thyroid Diffuse Enlargement] : the thyroid was not enlarged [Thyroid Nodule] : there were no palpable thyroid nodules [Auscultation Breath Sounds / Voice Sounds] : lungs were clear to auscultation bilaterally [Heart Rate And Rhythm] : heart rate was normal and rhythm regular [Heart Sounds] : normal S1 and S2 [Heart Sounds Gallop] : no gallops [Murmurs] : no murmurs [Heart Sounds Pericardial Friction Rub] : no pericardial rub [Examination Of The Chest] : the chest was normal in appearance [Chest Visual Inspection Thoracic Asymmetry] : no chest asymmetry [Diminished Respiratory Excursion] : normal chest expansion [2+] : left 2+ [No Abnormalities] : the abdominal aorta was not enlarged and no bruit was heard [Breast Appearance] : normal in appearance [Breast Palpation Mass] : no palpable masses [Bowel Sounds] : normal bowel sounds [Abdomen Soft] : soft [Abdomen Tenderness] : non-tender [Abdomen Mass (___ Cm)] : no abdominal mass palpated [Cervical Lymph Nodes Enlarged Posterior Bilaterally] : posterior cervical [Cervical Lymph Nodes Enlarged Anterior Bilaterally] : anterior cervical [Supraclavicular Lymph Nodes Enlarged Bilaterally] : supraclavicular [No CVA Tenderness] : no ~M costovertebral angle tenderness [No Spinal Tenderness] : no spinal tenderness [Abnormal Walk] : normal gait [Nail Clubbing] : no clubbing  or cyanosis of the fingernails [Musculoskeletal - Swelling] : no joint swelling seen [Motor Tone] : muscle strength and tone were normal [Skin Color & Pigmentation] : normal skin color and pigmentation [Skin Turgor] : normal skin turgor [] : no rash [Deep Tendon Reflexes (DTR)] : deep tendon reflexes were 2+ and symmetric [Sensation] : the sensory exam was normal to light touch and pinprick [No Focal Deficits] : no focal deficits [Oriented To Time, Place, And Person] : oriented to person, place, and time [Impaired Insight] : insight and judgment were intact [Affect] : the affect was normal [Right Carotid Bruit] : no bruit heard over the right carotid [Left Carotid Bruit] : no bruit heard over the left carotid [Right Femoral Bruit] : no bruit heard over the right femoral artery [Left Femoral Bruit] : no bruit heard over the left femoral artery [FreeTextEntry1] : referred

## 2021-03-26 NOTE — DATA REVIEWED
[FreeTextEntry1] : EGD on 1/4/21 by Dr. Lindo. Path of gastric antrum showed chronic inactive gastritis, negative H. Pylori. Gastric proximal biopsy showed chronic inactive gastritis, extensive intestinal and Paneth cell metaplasia, negative H. Pylori.\par \par Gastric emptying study on 1/26/21. Normal gastric emptying of liquids and solids; no evidence of gastroparesis.\par \par Barium Esophagram on 2/9/21 showed GE reflux and a small hiatal hernia.\par \par CT Chest on 3/11/21:\par - s/p Nissen fundoplication\par - small, 3.6cm hiatal hernia

## 2021-03-31 ENCOUNTER — OUTPATIENT (OUTPATIENT)
Dept: OUTPATIENT SERVICES | Facility: HOSPITAL | Age: 56
LOS: 1 days | End: 2021-03-31
Payer: COMMERCIAL

## 2021-03-31 VITALS
OXYGEN SATURATION: 96 % | DIASTOLIC BLOOD PRESSURE: 70 MMHG | HEIGHT: 72 IN | WEIGHT: 190.04 LBS | TEMPERATURE: 98 F | RESPIRATION RATE: 19 BRPM | SYSTOLIC BLOOD PRESSURE: 130 MMHG | HEART RATE: 72 BPM

## 2021-03-31 DIAGNOSIS — Z98.890 OTHER SPECIFIED POSTPROCEDURAL STATES: Chronic | ICD-10-CM

## 2021-03-31 DIAGNOSIS — Z87.19 PERSONAL HISTORY OF OTHER DISEASES OF THE DIGESTIVE SYSTEM: Chronic | ICD-10-CM

## 2021-03-31 DIAGNOSIS — K21.9 GASTRO-ESOPHAGEAL REFLUX DISEASE WITHOUT ESOPHAGITIS: ICD-10-CM

## 2021-03-31 DIAGNOSIS — I26.99 OTHER PULMONARY EMBOLISM WITHOUT ACUTE COR PULMONALE: ICD-10-CM

## 2021-03-31 DIAGNOSIS — Z90.89 ACQUIRED ABSENCE OF OTHER ORGANS: Chronic | ICD-10-CM

## 2021-03-31 DIAGNOSIS — I10 ESSENTIAL (PRIMARY) HYPERTENSION: ICD-10-CM

## 2021-03-31 DIAGNOSIS — Z96.89 PRESENCE OF OTHER SPECIFIED FUNCTIONAL IMPLANTS: Chronic | ICD-10-CM

## 2021-03-31 DIAGNOSIS — J45.909 UNSPECIFIED ASTHMA, UNCOMPLICATED: ICD-10-CM

## 2021-03-31 DIAGNOSIS — F43.10 POST-TRAUMATIC STRESS DISORDER, UNSPECIFIED: ICD-10-CM

## 2021-03-31 DIAGNOSIS — Z01.812 ENCOUNTER FOR PREPROCEDURAL LABORATORY EXAMINATION: ICD-10-CM

## 2021-03-31 LAB
ANION GAP SERPL CALC-SCNC: 9 MMOL/L — SIGNIFICANT CHANGE UP (ref 7–14)
BLD GP AB SCN SERPL QL: NEGATIVE — SIGNIFICANT CHANGE UP
BUN SERPL-MCNC: 16 MG/DL — SIGNIFICANT CHANGE UP (ref 7–23)
CALCIUM SERPL-MCNC: 9.4 MG/DL — SIGNIFICANT CHANGE UP (ref 8.4–10.5)
CHLORIDE SERPL-SCNC: 104 MMOL/L — SIGNIFICANT CHANGE UP (ref 98–107)
CO2 SERPL-SCNC: 28 MMOL/L — SIGNIFICANT CHANGE UP (ref 22–31)
CREAT SERPL-MCNC: 1.12 MG/DL — SIGNIFICANT CHANGE UP (ref 0.5–1.3)
GLUCOSE SERPL-MCNC: 96 MG/DL — SIGNIFICANT CHANGE UP (ref 70–99)
HCT VFR BLD CALC: 42.1 % — SIGNIFICANT CHANGE UP (ref 39–50)
HGB BLD-MCNC: 13.8 G/DL — SIGNIFICANT CHANGE UP (ref 13–17)
MCHC RBC-ENTMCNC: 30.1 PG — SIGNIFICANT CHANGE UP (ref 27–34)
MCHC RBC-ENTMCNC: 32.8 GM/DL — SIGNIFICANT CHANGE UP (ref 32–36)
MCV RBC AUTO: 91.9 FL — SIGNIFICANT CHANGE UP (ref 80–100)
NRBC # BLD: 0 /100 WBCS — SIGNIFICANT CHANGE UP
NRBC # FLD: 0 K/UL — SIGNIFICANT CHANGE UP
PLATELET # BLD AUTO: 250 K/UL — SIGNIFICANT CHANGE UP (ref 150–400)
POTASSIUM SERPL-MCNC: 3.9 MMOL/L — SIGNIFICANT CHANGE UP (ref 3.5–5.3)
POTASSIUM SERPL-SCNC: 3.9 MMOL/L — SIGNIFICANT CHANGE UP (ref 3.5–5.3)
RBC # BLD: 4.58 M/UL — SIGNIFICANT CHANGE UP (ref 4.2–5.8)
RBC # FLD: 14.1 % — SIGNIFICANT CHANGE UP (ref 10.3–14.5)
RH IG SCN BLD-IMP: POSITIVE — SIGNIFICANT CHANGE UP
SODIUM SERPL-SCNC: 141 MMOL/L — SIGNIFICANT CHANGE UP (ref 135–145)
WBC # BLD: 9.43 K/UL — SIGNIFICANT CHANGE UP (ref 3.8–10.5)
WBC # FLD AUTO: 9.43 K/UL — SIGNIFICANT CHANGE UP (ref 3.8–10.5)

## 2021-03-31 PROCEDURE — 93010 ELECTROCARDIOGRAM REPORT: CPT

## 2021-03-31 RX ORDER — DULAGLUTIDE 4.5 MG/.5ML
0.5 INJECTION, SOLUTION SUBCUTANEOUS
Qty: 0 | Refills: 0 | DISCHARGE

## 2021-03-31 RX ORDER — ERGOCALCIFEROL 1.25 MG/1
1 CAPSULE ORAL
Qty: 0 | Refills: 0 | DISCHARGE

## 2021-03-31 RX ORDER — METFORMIN HYDROCHLORIDE 850 MG/1
1 TABLET ORAL
Qty: 0 | Refills: 0 | DISCHARGE

## 2021-03-31 RX ORDER — ALBUTEROL 90 UG/1
2 AEROSOL, METERED ORAL
Qty: 0 | Refills: 0 | DISCHARGE

## 2021-03-31 RX ORDER — ZOLPIDEM TARTRATE 10 MG/1
12.5 TABLET ORAL
Qty: 0 | Refills: 0 | DISCHARGE

## 2021-03-31 NOTE — H&P PST ADULT - NEGATIVE PSYCHIATRIC SYMPTOMS
no suicidal ideation/no anxiety/no memory loss/no paranoia/no mood swings/no agitation/no visual hallucinations/no auditory hallucinations

## 2021-03-31 NOTE — H&P PST ADULT - NEGATIVE MUSCULOSKELETAL SYMPTOMS
no arthritis/no joint swelling/no myalgia/no muscle cramps/no muscle weakness/no stiffness/no neck pain/no arm pain L/no arm pain R/no back pain/no leg pain L/no leg pain R

## 2021-03-31 NOTE — H&P PST ADULT - NSICDXPASTMEDICALHX_GEN_ALL_CORE_FT
PAST MEDICAL HISTORY:  Anxiety     Asthma     COVID-19 vaccine series completed     COVID-19 virus infection h/o 4/2020    Depression     Diabetes mellitus     Gastroparesis     GERD (gastroesophageal reflux disease)     Hiatal hernia     HTN (hypertension)     Insomnia     Intervertebral disc disorder bulging discs cervical and lumbar region    Low testosterone in male     Osteoarthritis     PE (pulmonary thromboembolism)     Reactive airway disease     Shoulder pain, left      PAST MEDICAL HISTORY:  Anxiety PTSD - [employer] responnder    Asthma     COVID-19 vaccine series completed     COVID-19 virus infection h/o 4/2020    Depression     Diabetes mellitus     Gastroparesis     GERD (gastroesophageal reflux disease)     Hiatal hernia     HTN (hypertension)     Insomnia     Intervertebral disc disorder bulging discs cervical and lumbar region    Low testosterone in male     Osteoarthritis     PE (pulmonary thromboembolism)     Reactive airway disease     Shoulder pain, left

## 2021-03-31 NOTE — H&P PST ADULT - NSANTHOSAYNRD_GEN_A_CORE
tested and neg/No. TYLER screening performed.  STOP BANG Legend: 0-2 = LOW Risk; 3-4 = INTERMEDIATE Risk; 5-8 = HIGH Risk

## 2021-03-31 NOTE — H&P PST ADULT - NSICDXFAMILYHX_GEN_ALL_CORE_FT
FAMILY HISTORY:  Father  Still living? Unknown  Family history of diabetes mellitus, Age at diagnosis: Age Unknown  Family history of prostate cancer, Age at diagnosis: Age Unknown  FHx: renal failure, Age at diagnosis: Age Unknown    Mother  Still living? No  Family history of asthma, Age at diagnosis: Age Unknown  Family history of other heart disease, Age at diagnosis: Age Unknown  FHx: renal failure, Age at diagnosis: Age Unknown    Sibling  Still living? No  Family history of asthma, Age at diagnosis: Age Unknown  Family history of diabetes mellitus, Age at diagnosis: Age Unknown

## 2021-03-31 NOTE — H&P PST ADULT - NSICDXPROBLEM_GEN_ALL_CORE_FT
PROBLEM DIAGNOSES  Problem: Pulmonary thromboembolism  Assessment and Plan: per pt scheduled within 72 hrs of this surgery     Problem: Asthma  Assessment and Plan: Pt instructed to use all scheduled inhalers on morning of surgery.     Problem: Encounter for preprocedure screening laboratory testing for COVID-19  Assessment and Plan:     Problem: Hypertension  Assessment and Plan: Pt instructed to take metoprolol  on morning of surgery  [place] precautions recommended.  OR booking notified via fax.      Problem: Pulmonary embolism  Assessment and Plan: h/o 2018 on eliquis, followed by PCP  was instructed by surgeon to hold 5 days prior  Pt has appointment for medical eval, to confirm with PCP  Pending copy of eval report with instructions on eliquis    Problem: Gastroesophageal reflux disease without esophagitis  Assessment and Plan: scheduled for upper endoscopy, robotic assisted laparoscopic recurrent hiatal hernia repair, possible left thoracotomy on 4/12/2021  Written & verbal preop instructions, gi prophylaxis & surgical soap given  Pt verbalized good understanding.  Teach back done on surgical soap instructions.      Problem: Post traumatic stress disorder (PTSD)  Assessment and Plan: Pt instructed to take trinttelix on morning of surgery.

## 2021-03-31 NOTE — H&P PST ADULT - HISTORY OF PRESENT ILLNESS
54 y/o male w/ PMH of PE (7/2018, on Eliquis), DM, Asthma, HTN. Presents to PST with a preop dx of Type 2 DM with diabetic autonomic (poly) neuropathy, gastroparesis and to be evaluated for a scheduled placement of gastric pacemaker and upper endoscopy on 10/22/18. Pt states "the nerve in my stomach isn't functioning well, my emptying ability isn't good" this problem has been present for several years, pt is S/P fundoplication x 2, hiatal hernia repair as well. Pt now referred to Dr Herrera for gastric pacemaker for which is now scheduled. 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 Pt with c/o bloating delayed gastric emptying, fullness, nausea & intermittent hick ups due to recurrent hiatal hernia.  scheduled for upper endoscopy, robotic assisted laparoscopic recurrent hiatal hernia repair, possible left thoracotomy.

## 2021-03-31 NOTE — H&P PST ADULT - NEGATIVE NEUROLOGICAL SYMPTOMS
no transient paralysis/no weakness/no paresthesias/no generalized seizures/no focal seizures/no syncope/no tremors/no vertigo/no loss of sensation/no difficulty walking/no headache/no loss of consciousness/no confusion/no facial palsy

## 2021-03-31 NOTE — H&P PST ADULT - NSICDXPASTSURGICALHX_GEN_ALL_CORE_FT
PAST SURGICAL HISTORY:  H/O hiatal hernia repaired    H/O submucous nasal surgery septoplasty    S/P Achilles tendon repair right - done 2x    S/P carpal tunnel release right    S/P laparoscopic fundoplication 2x    S/P tonsillectomy and adenoidectomy      PAST SURGICAL HISTORY:  H/O hiatal hernia repaired    H/O submucous nasal surgery septoplasty    Presence of gastric pacemaker 2018    S/P Achilles tendon repair right - done 2x    S/P carpal tunnel release right    S/P laparoscopic fundoplication 2x    S/P tonsillectomy and adenoidectomy

## 2021-03-31 NOTE — H&P PST ADULT - GASTROINTESTINAL COMMENTS
gastroparesis "due to vagal nerve damage" gastroparesis "due to vagal nerve damage", recurrent hiatal hernia

## 2021-04-03 PROBLEM — F41.9 ANXIETY DISORDER, UNSPECIFIED: Chronic | Status: ACTIVE | Noted: 2017-09-28

## 2021-04-03 PROBLEM — U07.1 COVID-19: Chronic | Status: ACTIVE | Noted: 2021-03-31

## 2021-04-03 PROBLEM — Z92.29 PERSONAL HISTORY OF OTHER DRUG THERAPY: Chronic | Status: ACTIVE | Noted: 2021-03-31

## 2021-04-06 ENCOUNTER — APPOINTMENT (OUTPATIENT)
Dept: DISASTER EMERGENCY | Facility: CLINIC | Age: 56
End: 2021-04-06

## 2021-04-07 LAB — SARS-COV-2 N GENE NPH QL NAA+PROBE: NOT DETECTED

## 2021-04-09 ENCOUNTER — OUTPATIENT (OUTPATIENT)
Dept: OUTPATIENT SERVICES | Facility: HOSPITAL | Age: 56
LOS: 1 days | Discharge: HOME | End: 2021-04-09

## 2021-04-09 ENCOUNTER — APPOINTMENT (OUTPATIENT)
Dept: DISASTER EMERGENCY | Facility: CLINIC | Age: 56
End: 2021-04-09

## 2021-04-09 VITALS
SYSTOLIC BLOOD PRESSURE: 133 MMHG | HEART RATE: 63 BPM | WEIGHT: 190.04 LBS | RESPIRATION RATE: 16 BRPM | OXYGEN SATURATION: 100 % | TEMPERATURE: 98 F | DIASTOLIC BLOOD PRESSURE: 81 MMHG | HEIGHT: 72 IN

## 2021-04-09 DIAGNOSIS — Z98.890 OTHER SPECIFIED POSTPROCEDURAL STATES: Chronic | ICD-10-CM

## 2021-04-09 DIAGNOSIS — Z87.19 PERSONAL HISTORY OF OTHER DISEASES OF THE DIGESTIVE SYSTEM: Chronic | ICD-10-CM

## 2021-04-09 DIAGNOSIS — Z96.89 PRESENCE OF OTHER SPECIFIED FUNCTIONAL IMPLANTS: Chronic | ICD-10-CM

## 2021-04-09 DIAGNOSIS — R13.10 DYSPHAGIA, UNSPECIFIED: ICD-10-CM

## 2021-04-09 DIAGNOSIS — Z90.89 ACQUIRED ABSENCE OF OTHER ORGANS: Chronic | ICD-10-CM

## 2021-04-09 RX ORDER — LIDOCAINE 4 G/100G
30 CREAM TOPICAL ONCE
Refills: 0 | Status: DISCONTINUED | OUTPATIENT
Start: 2021-04-09 | End: 2021-04-24

## 2021-04-09 NOTE — ASU PATIENT PROFILE, ADULT - FALL HARM RISK TYPE OF ASSESSMENT
Blood pressure currently low,85/46 - 125/69, holding beta blocker and ACEI  - monitor BP  - add back BB, ACEI as tolerated Admission

## 2021-04-09 NOTE — ASU PATIENT PROFILE, ADULT - PSH
H/O hiatal hernia  repaired  H/O submucous nasal surgery  septoplasty  Presence of gastric pacemaker  2018  S/P Achilles tendon repair  right - done 2x  S/P carpal tunnel release  right  S/P laparoscopic fundoplication  2x  S/P tonsillectomy and adenoidectomy

## 2021-04-09 NOTE — ASU PATIENT PROFILE, ADULT - PMH
Anxiety  PTSD - [employer] responnder  Asthma    COVID-19 vaccine series completed    COVID-19 virus infection  h/o 4/2020  Depression    Diabetes mellitus    Gastroparesis    GERD (gastroesophageal reflux disease)    Hiatal hernia    HTN (hypertension)    Insomnia    Intervertebral disc disorder  bulging discs cervical and lumbar region  Low testosterone in male    Osteoarthritis    PE (pulmonary thromboembolism)    Reactive airway disease    Shoulder pain, left

## 2021-04-10 LAB — SARS-COV-2 N GENE NPH QL NAA+PROBE: NOT DETECTED

## 2021-04-12 ENCOUNTER — APPOINTMENT (OUTPATIENT)
Dept: THORACIC SURGERY | Facility: HOSPITAL | Age: 56
End: 2021-04-12

## 2021-04-12 ENCOUNTER — INPATIENT (INPATIENT)
Facility: HOSPITAL | Age: 56
LOS: 0 days | Discharge: HOME | End: 2021-04-13
Attending: THORACIC SURGERY (CARDIOTHORACIC VASCULAR SURGERY) | Admitting: THORACIC SURGERY (CARDIOTHORACIC VASCULAR SURGERY)
Payer: COMMERCIAL

## 2021-04-12 VITALS
TEMPERATURE: 98 F | HEIGHT: 72 IN | OXYGEN SATURATION: 99 % | HEART RATE: 66 BPM | RESPIRATION RATE: 16 BRPM | DIASTOLIC BLOOD PRESSURE: 72 MMHG | SYSTOLIC BLOOD PRESSURE: 125 MMHG | WEIGHT: 190.04 LBS

## 2021-04-12 DIAGNOSIS — Z98.890 OTHER SPECIFIED POSTPROCEDURAL STATES: Chronic | ICD-10-CM

## 2021-04-12 DIAGNOSIS — Z90.89 ACQUIRED ABSENCE OF OTHER ORGANS: Chronic | ICD-10-CM

## 2021-04-12 DIAGNOSIS — K21.9 GASTRO-ESOPHAGEAL REFLUX DISEASE WITHOUT ESOPHAGITIS: ICD-10-CM

## 2021-04-12 DIAGNOSIS — Z87.19 PERSONAL HISTORY OF OTHER DISEASES OF THE DIGESTIVE SYSTEM: Chronic | ICD-10-CM

## 2021-04-12 DIAGNOSIS — Z96.89 PRESENCE OF OTHER SPECIFIED FUNCTIONAL IMPLANTS: Chronic | ICD-10-CM

## 2021-04-12 LAB — GLUCOSE BLDC GLUCOMTR-MCNC: 156 MG/DL — HIGH (ref 70–99)

## 2021-04-12 PROCEDURE — 43235 EGD DIAGNOSTIC BRUSH WASH: CPT

## 2021-04-12 PROCEDURE — 99233 SBSQ HOSP IP/OBS HIGH 50: CPT

## 2021-04-12 PROCEDURE — S2900 ROBOTIC SURGICAL SYSTEM: CPT | Mod: NC

## 2021-04-12 PROCEDURE — 43281 LAP PARAESOPHAG HERN REPAIR: CPT | Mod: AS,22

## 2021-04-12 PROCEDURE — 43281 LAP PARAESOPHAG HERN REPAIR: CPT | Mod: 22

## 2021-04-12 PROCEDURE — 71045 X-RAY EXAM CHEST 1 VIEW: CPT | Mod: 26

## 2021-04-12 RX ORDER — HEPARIN SODIUM 5000 [USP'U]/ML
5000 INJECTION INTRAVENOUS; SUBCUTANEOUS ONCE
Refills: 0 | Status: COMPLETED | OUTPATIENT
Start: 2021-04-12 | End: 2021-04-12

## 2021-04-12 RX ORDER — BUDESONIDE AND FORMOTEROL FUMARATE DIHYDRATE 160; 4.5 UG/1; UG/1
2 AEROSOL RESPIRATORY (INHALATION)
Refills: 0 | Status: DISCONTINUED | OUTPATIENT
Start: 2021-04-12 | End: 2021-04-13

## 2021-04-12 RX ORDER — TIOTROPIUM BROMIDE 18 UG/1
1 CAPSULE ORAL; RESPIRATORY (INHALATION) DAILY
Refills: 0 | Status: DISCONTINUED | OUTPATIENT
Start: 2021-04-12 | End: 2021-04-13

## 2021-04-12 RX ORDER — NALBUPHINE HYDROCHLORIDE 10 MG/ML
2.5 INJECTION, SOLUTION INTRAMUSCULAR; INTRAVENOUS; SUBCUTANEOUS EVERY 6 HOURS
Refills: 0 | Status: DISCONTINUED | OUTPATIENT
Start: 2021-04-12 | End: 2021-04-13

## 2021-04-12 RX ORDER — HYDROMORPHONE HYDROCHLORIDE 2 MG/ML
0.5 INJECTION INTRAMUSCULAR; INTRAVENOUS; SUBCUTANEOUS
Refills: 0 | Status: DISCONTINUED | OUTPATIENT
Start: 2021-04-12 | End: 2021-04-13

## 2021-04-12 RX ORDER — ACETAMINOPHEN 500 MG
1000 TABLET ORAL ONCE
Refills: 0 | Status: COMPLETED | OUTPATIENT
Start: 2021-04-12 | End: 2021-04-12

## 2021-04-12 RX ORDER — ONDANSETRON 8 MG/1
4 TABLET, FILM COATED ORAL EVERY 6 HOURS
Refills: 0 | Status: DISCONTINUED | OUTPATIENT
Start: 2021-04-12 | End: 2021-04-13

## 2021-04-12 RX ORDER — GABAPENTIN 400 MG/1
300 CAPSULE ORAL AT BEDTIME
Refills: 0 | Status: DISCONTINUED | OUTPATIENT
Start: 2021-04-12 | End: 2021-04-13

## 2021-04-12 RX ORDER — HEPARIN SODIUM 5000 [USP'U]/ML
5000 INJECTION INTRAVENOUS; SUBCUTANEOUS EVERY 8 HOURS
Refills: 0 | Status: DISCONTINUED | OUTPATIENT
Start: 2021-04-12 | End: 2021-04-13

## 2021-04-12 RX ORDER — HYDROMORPHONE HYDROCHLORIDE 2 MG/ML
30 INJECTION INTRAMUSCULAR; INTRAVENOUS; SUBCUTANEOUS
Refills: 0 | Status: DISCONTINUED | OUTPATIENT
Start: 2021-04-12 | End: 2021-04-13

## 2021-04-12 RX ORDER — ACETAMINOPHEN 500 MG
1000 TABLET ORAL ONCE
Refills: 0 | Status: COMPLETED | OUTPATIENT
Start: 2021-04-13 | End: 2021-04-13

## 2021-04-12 RX ORDER — SODIUM CHLORIDE 9 MG/ML
1000 INJECTION, SOLUTION INTRAVENOUS
Refills: 0 | Status: DISCONTINUED | OUTPATIENT
Start: 2021-04-12 | End: 2021-04-12

## 2021-04-12 RX ORDER — TAMSULOSIN HYDROCHLORIDE 0.4 MG/1
0.4 CAPSULE ORAL AT BEDTIME
Refills: 0 | Status: DISCONTINUED | OUTPATIENT
Start: 2021-04-12 | End: 2021-04-13

## 2021-04-12 RX ORDER — SODIUM CHLORIDE 9 MG/ML
1000 INJECTION, SOLUTION INTRAVENOUS
Refills: 0 | Status: DISCONTINUED | OUTPATIENT
Start: 2021-04-12 | End: 2021-04-13

## 2021-04-12 RX ORDER — NALOXONE HYDROCHLORIDE 4 MG/.1ML
0.1 SPRAY NASAL
Refills: 0 | Status: DISCONTINUED | OUTPATIENT
Start: 2021-04-12 | End: 2021-04-13

## 2021-04-12 RX ADMIN — HEPARIN SODIUM 5000 UNIT(S): 5000 INJECTION INTRAVENOUS; SUBCUTANEOUS at 21:02

## 2021-04-12 RX ADMIN — HYDROMORPHONE HYDROCHLORIDE 0.5 MILLIGRAM(S): 2 INJECTION INTRAMUSCULAR; INTRAVENOUS; SUBCUTANEOUS at 21:01

## 2021-04-12 RX ADMIN — HYDROMORPHONE HYDROCHLORIDE 30 MILLILITER(S): 2 INJECTION INTRAMUSCULAR; INTRAVENOUS; SUBCUTANEOUS at 21:00

## 2021-04-12 RX ADMIN — Medication 1000 MILLIGRAM(S): at 20:58

## 2021-04-12 RX ADMIN — Medication 400 MILLIGRAM(S): at 19:41

## 2021-04-12 RX ADMIN — GABAPENTIN 300 MILLIGRAM(S): 400 CAPSULE ORAL at 21:02

## 2021-04-12 RX ADMIN — SODIUM CHLORIDE 30 MILLILITER(S): 9 INJECTION, SOLUTION INTRAVENOUS at 13:46

## 2021-04-12 RX ADMIN — TAMSULOSIN HYDROCHLORIDE 0.4 MILLIGRAM(S): 0.4 CAPSULE ORAL at 21:02

## 2021-04-12 RX ADMIN — SODIUM CHLORIDE 75 MILLILITER(S): 9 INJECTION, SOLUTION INTRAVENOUS at 19:38

## 2021-04-12 RX ADMIN — HEPARIN SODIUM 5000 UNIT(S): 5000 INJECTION INTRAVENOUS; SUBCUTANEOUS at 13:47

## 2021-04-12 NOTE — BRIEF OPERATIVE NOTE - COMMENTS
I first assisted through out the entire case, including port placement, bedside assist while the surgeon at the console, and wound closure, VICKEY Salcedo

## 2021-04-13 ENCOUNTER — TRANSCRIPTION ENCOUNTER (OUTPATIENT)
Age: 56
End: 2021-04-13

## 2021-04-13 VITALS
OXYGEN SATURATION: 97 % | HEART RATE: 89 BPM | TEMPERATURE: 98 F | SYSTOLIC BLOOD PRESSURE: 124 MMHG | RESPIRATION RATE: 23 BRPM | DIASTOLIC BLOOD PRESSURE: 75 MMHG

## 2021-04-13 LAB
ANION GAP SERPL CALC-SCNC: 10 MMOL/L — SIGNIFICANT CHANGE UP (ref 7–14)
BUN SERPL-MCNC: 10 MG/DL — SIGNIFICANT CHANGE UP (ref 7–23)
CALCIUM SERPL-MCNC: 9.5 MG/DL — SIGNIFICANT CHANGE UP (ref 8.4–10.5)
CHLORIDE SERPL-SCNC: 103 MMOL/L — SIGNIFICANT CHANGE UP (ref 98–107)
CO2 SERPL-SCNC: 27 MMOL/L — SIGNIFICANT CHANGE UP (ref 22–31)
COVID-19 SPIKE DOMAIN AB INTERP: POSITIVE
COVID-19 SPIKE DOMAIN ANTIBODY RESULT: >250 U/ML — HIGH
CREAT SERPL-MCNC: 1.03 MG/DL — SIGNIFICANT CHANGE UP (ref 0.5–1.3)
GLUCOSE BLDC GLUCOMTR-MCNC: 118 MG/DL — HIGH (ref 70–99)
GLUCOSE SERPL-MCNC: 131 MG/DL — HIGH (ref 70–99)
HCT VFR BLD CALC: 43.8 % — SIGNIFICANT CHANGE UP (ref 39–50)
HGB BLD-MCNC: 14.7 G/DL — SIGNIFICANT CHANGE UP (ref 13–17)
MAGNESIUM SERPL-MCNC: 2 MG/DL — SIGNIFICANT CHANGE UP (ref 1.6–2.6)
MCHC RBC-ENTMCNC: 30.4 PG — SIGNIFICANT CHANGE UP (ref 27–34)
MCHC RBC-ENTMCNC: 33.6 GM/DL — SIGNIFICANT CHANGE UP (ref 32–36)
MCV RBC AUTO: 90.5 FL — SIGNIFICANT CHANGE UP (ref 80–100)
NRBC # BLD: 0 /100 WBCS — SIGNIFICANT CHANGE UP
NRBC # FLD: 0 K/UL — SIGNIFICANT CHANGE UP
PHOSPHATE SERPL-MCNC: 3.6 MG/DL — SIGNIFICANT CHANGE UP (ref 2.5–4.5)
PLATELET # BLD AUTO: 241 K/UL — SIGNIFICANT CHANGE UP (ref 150–400)
POTASSIUM SERPL-MCNC: 4.3 MMOL/L — SIGNIFICANT CHANGE UP (ref 3.5–5.3)
POTASSIUM SERPL-SCNC: 4.3 MMOL/L — SIGNIFICANT CHANGE UP (ref 3.5–5.3)
RBC # BLD: 4.84 M/UL — SIGNIFICANT CHANGE UP (ref 4.2–5.8)
RBC # FLD: 14.3 % — SIGNIFICANT CHANGE UP (ref 10.3–14.5)
SARS-COV-2 IGG+IGM SERPL QL IA: >250 U/ML — HIGH
SARS-COV-2 IGG+IGM SERPL QL IA: POSITIVE
SODIUM SERPL-SCNC: 140 MMOL/L — SIGNIFICANT CHANGE UP (ref 135–145)
WBC # BLD: 13.17 K/UL — HIGH (ref 3.8–10.5)
WBC # FLD AUTO: 13.17 K/UL — HIGH (ref 3.8–10.5)

## 2021-04-13 PROCEDURE — 74220 X-RAY XM ESOPHAGUS 1CNTRST: CPT | Mod: 26

## 2021-04-13 PROCEDURE — 99233 SBSQ HOSP IP/OBS HIGH 50: CPT

## 2021-04-13 PROCEDURE — 71045 X-RAY EXAM CHEST 1 VIEW: CPT | Mod: 26

## 2021-04-13 RX ORDER — LIDOCAINE 4 G/100G
1 CREAM TOPICAL EVERY 24 HOURS
Refills: 0 | Status: DISCONTINUED | OUTPATIENT
Start: 2021-04-13 | End: 2021-04-13

## 2021-04-13 RX ORDER — OXYCODONE HYDROCHLORIDE 5 MG/1
5 TABLET ORAL ONCE
Refills: 0 | Status: DISCONTINUED | OUTPATIENT
Start: 2021-04-13 | End: 2021-04-13

## 2021-04-13 RX ORDER — OXYCODONE HYDROCHLORIDE 5 MG/1
1 TABLET ORAL
Qty: 30 | Refills: 0
Start: 2021-04-13

## 2021-04-13 RX ORDER — ACETAMINOPHEN 500 MG
750 TABLET ORAL ONCE
Refills: 0 | Status: DISCONTINUED | OUTPATIENT
Start: 2021-04-13 | End: 2021-04-13

## 2021-04-13 RX ADMIN — HYDROMORPHONE HYDROCHLORIDE 30 MILLILITER(S): 2 INJECTION INTRAMUSCULAR; INTRAVENOUS; SUBCUTANEOUS at 06:58

## 2021-04-13 RX ADMIN — HYDROMORPHONE HYDROCHLORIDE 0.5 MILLIGRAM(S): 2 INJECTION INTRAMUSCULAR; INTRAVENOUS; SUBCUTANEOUS at 05:10

## 2021-04-13 RX ADMIN — OXYCODONE HYDROCHLORIDE 5 MILLIGRAM(S): 5 TABLET ORAL at 12:23

## 2021-04-13 RX ADMIN — ONDANSETRON 4 MILLIGRAM(S): 8 TABLET, FILM COATED ORAL at 10:01

## 2021-04-13 RX ADMIN — HEPARIN SODIUM 5000 UNIT(S): 5000 INJECTION INTRAVENOUS; SUBCUTANEOUS at 05:09

## 2021-04-13 RX ADMIN — BUDESONIDE AND FORMOTEROL FUMARATE DIHYDRATE 2 PUFF(S): 160; 4.5 AEROSOL RESPIRATORY (INHALATION) at 11:00

## 2021-04-13 RX ADMIN — TIOTROPIUM BROMIDE 1 CAPSULE(S): 18 CAPSULE ORAL; RESPIRATORY (INHALATION) at 11:30

## 2021-04-13 RX ADMIN — Medication 1000 MILLIGRAM(S): at 07:00

## 2021-04-13 RX ADMIN — HYDROMORPHONE HYDROCHLORIDE 0.5 MILLIGRAM(S): 2 INJECTION INTRAMUSCULAR; INTRAVENOUS; SUBCUTANEOUS at 08:50

## 2021-04-13 RX ADMIN — SODIUM CHLORIDE 75 MILLILITER(S): 9 INJECTION, SOLUTION INTRAVENOUS at 10:35

## 2021-04-13 RX ADMIN — Medication 400 MILLIGRAM(S): at 06:03

## 2021-04-13 NOTE — DISCHARGE NOTE PROVIDER - NSDCMRMEDTOKEN_GEN_ALL_CORE_FT
Advair Diskus 500 mcg-50 mcg inhalation powder: 1 puff(s) inhaled 2 times a day  Eliquis 2.5 mg oral tablet: 1 tab(s) orally 2 times a day  fluticasone propionate 55 mcg/inh inhalation powder: 1 puff(s) inhaled every 12 hours  gabapentin 300 mg oral capsule: orally once a day (at bedtime)  Linzess 145 mcg oral capsule: 1 cap(s) orally once a day AM  Metoprolol Succinate ER 50 mg oral tablet, extended release: 1 tab(s) orally once a day AM  omeprazole 40 mg oral delayed release capsule: 1 cap(s) orally once a day AM  Spiriva 18 mcg inhalation capsule: 1 cap(s) inhaled once a day AM  Trintellix 10 mg oral tablet: 1 tab(s) orally once a day AM  zolpidem: 12.5  orally once a day (at bedtime) -Insomnia MDD:2 tabs   Advair Diskus 500 mcg-50 mcg inhalation powder: 1 puff(s) inhaled 2 times a day  Eliquis 2.5 mg oral tablet: 1 tab(s) orally 2 times a day  fluticasone propionate 55 mcg/inh inhalation powder: 1 puff(s) inhaled every 12 hours  gabapentin 300 mg oral capsule: orally once a day (at bedtime)  Linzess 145 mcg oral capsule: 1 cap(s) orally once a day AM  Metoprolol Succinate ER 50 mg oral tablet, extended release: 1 tab(s) orally once a day AM  omeprazole 40 mg oral delayed release capsule: 1 cap(s) orally once a day AM  oxyCODONE 5 mg oral capsule: 1 cap(s) orally every 6 hours, As Needed -for severe pain MDD:5   Spiriva 18 mcg inhalation capsule: 1 cap(s) inhaled once a day AM  Trintellix 10 mg oral tablet: 1 tab(s) orally once a day AM  zolpidem: 12.5  orally once a day (at bedtime) -Insomnia MDD:2 tabs   Advair Diskus 500 mcg-50 mcg inhalation powder: 1 puff(s) inhaled 2 times a day  Eliquis 2.5 mg oral tablet: 1 tab(s) orally 2 times a day  fluticasone propionate 55 mcg/inh inhalation powder: 1 puff(s) inhaled every 12 hours  gabapentin 300 mg oral capsule: orally once a day (at bedtime)  Linzess 145 mcg oral capsule: 1 cap(s) orally once a day AM  Metoprolol Succinate ER 50 mg oral tablet, extended release: 1 tab(s) orally once a day AM  omeprazole 40 mg oral delayed release capsule: 1 cap(s) orally once a day AM  oxyCODONE 5 mg oral tablet: 1 tab(s) orally every 6 hours, As Needed MDD:4 Tabs  Spiriva 18 mcg inhalation capsule: 1 cap(s) inhaled once a day AM  Trintellix 10 mg oral tablet: 1 tab(s) orally once a day AM  zolpidem: 12.5  orally once a day (at bedtime) -Insomnia MDD:2 tabs

## 2021-04-13 NOTE — DISCHARGE NOTE PROVIDER - NSDCCPTREATMENT_GEN_ALL_CORE_FT
PRINCIPAL PROCEDURE  Procedure: Robot-assisted thoracoscopic repair of hiatal hernia  Findings and Treatment: Reop      SECONDARY PROCEDURE  Procedure: Robot-assisted [place] fundoplication  Findings and Treatment: Revision

## 2021-04-13 NOTE — DISCHARGE NOTE PROVIDER - NSDCFUADDAPPT_GEN_ALL_CORE_FT
Call Thoracic Surgery office at [phone] to schedule an appointment with  Call Thoracic Surgery office at [phone] to schedule an appointment with Dr. Javier.

## 2021-04-13 NOTE — DISCHARGE NOTE PROVIDER - INSTRUCTIONS
Clear liquids only until your followup appt with Dr. Javier. Clear liquids only x 2 days; advance to full liquids x 2 days and then continue soft diet until your followup appt with Dr. Javier.

## 2021-04-13 NOTE — DISCHARGE NOTE PROVIDER - NSDCFUADDINST_GEN_ALL_CORE_FT
You may shower in 24 hours with dressing and remove in the shower. Keep the wound clean and dry it well after showering.  It’s OK if some fluid comes out of the chest tube hole unless blood or purulent.  No Driving while taking pain meds. Some coughing and discomfort is expected.

## 2021-04-13 NOTE — DISCHARGE NOTE NURSING/CASE MANAGEMENT/SOCIAL WORK - NSTRANSFERBELONGINGSDISPO_GEN_A_NUR
From: Sangeeta Sweeney  To: Jordan Zurita  Sent: 2/11/2021 11:49 AM CST  Subject: After-Visit Question    Following today’s visit. Recommendation for occasional pain/squeeze like experiencing? Any pain relief I could take? Currently resting heart rate was 52 BPM then went up to 98.   Do I need to get back on the metoprolol tartarate low dose in your opinion?  I was feeling well. Just the pinch these 2 last days.  Any advise appreciated.  Thank you  Sangeeta   with patient

## 2021-04-13 NOTE — DISCHARGE NOTE NURSING/CASE MANAGEMENT/SOCIAL WORK - NSTRANSFERBELONGINGSRESP_GEN_A_NUR
Spoke with patient's son and informed him of patient's lab results and plan of care. Son reports patient has not had a vitamin B12 injection for the last 2 months and is also not sure if she has been taking the lovastatin 20mg.  Son reports he will return c yes

## 2021-04-13 NOTE — PROGRESS NOTE ADULT - SUBJECTIVE AND OBJECTIVE BOX
MAKR GARCIA            MRN-[id number]     Male    No Known Allergies                 56 y/o male w/ PMH of PE (7/2018, on Eliquis), Asthma, HTN, gastroparesis due to H/o type II DM  & vagal nerve disorder h/o placement of gastric pacemaker  on 10/22/18. Pt with c/o bloating delayed gastric emptying, fullness, nausea & intermittent hick ups due to recurrent hiatal hernia.  scheduled for upper endoscopy, robotic assisted laparoscopic recurrent hiatal hernia repair, possible left thoracotomy.      Procedure: Robot-assisted laparoscopic repair of recurrent hiatal hernia, revision of Toupet fundoplication 4/12/2021      Issues:              Hiatal Hernia              Postop pain  Depression / Anxiety  GERD  Hx of PE  DM-2  Hx of Gastroparesis  Asthma                 PAST MEDICAL & SURGICAL HISTORY:  HTN (hypertension)    Reactive airway disease    Asthma    GERD (gastroesophageal reflux disease)    Hiatal hernia    Diabetes mellitus    Anxiety  PTSD - [facility] [employer] [facility] responnder    Low testosterone in male    Osteoarthritis    Intervertebral disc disorder  bulging discs cervical and lumbar region    Shoulder pain, left    Depression    PE (pulmonary thromboembolism)    Insomnia    Gastroparesis    COVID-19 virus infection  h/o 4/2020    COVID-19 vaccine series completed    H/O submucous nasal surgery  septoplasty    S/P laparoscopic fundoplication  2x    S/P Achilles tendon repair  right - done 2x    S/P carpal tunnel release  right    S/P tonsillectomy and adenoidectomy    H/O hiatal hernia  repaired    Presence of gastric pacemaker  2018          ICU Vital Signs Last 24 Hrs  T(C): 36.8 (12 Apr 2021 17:55), Max: 36.8 (12 Apr 2021 17:55)  T(F): 98.3 (12 Apr 2021 17:55), Max: 98.3 (12 Apr 2021 17:55)  HR: 66 (12 Apr 2021 18:15) (66 - 68)  BP: 124/69 (12 Apr 2021 18:15) (122/57 - 136/69)  BP(mean): 81 (12 Apr 2021 18:15) (73 - 82)  ABP: --  ABP(mean): --  RR: 16 (12 Apr 2021 18:15) (14 - 18)  SpO2: 100% (12 Apr 2021 18:15) (99% - 100%)    I&O's Detail    12 Apr 2021 07:01  -  12 Apr 2021 18:17  --------------------------------------------------------  IN:    Lactated Ringers: 60 mL  Total IN: 60 mL    OUT:    Voided (mL): 150 mL  Total OUT: 150 mL    Total NET: -90 mL        CAPILLARY BLOOD GLUCOSE      Home Medications:  Advair Diskus 500 mcg-50 mcg inhalation powder: 1 puff(s) inhaled 2 times a day (12 Apr 2021 11:28)  Eliquis 2.5 mg oral tablet: 1 tab(s) orally 2 times a day (12 Apr 2021 11:28)  fluticasone propionate 55 mcg/inh inhalation powder: 1 puff(s) inhaled every 12 hours (12 Apr 2021 11:28)  gabapentin 300 mg oral capsule: orally once a day (at bedtime) (12 Apr 2021 11:28)  Linzess 145 mcg oral capsule: 1 cap(s) orally once a day AM (12 Apr 2021 11:28)  Metoprolol Succinate ER 50 mg oral tablet, extended release: 1 tab(s) orally once a day AM (12 Apr 2021 11:28)  omeprazole 40 mg oral delayed release capsule: 1 cap(s) orally once a day AM (12 Apr 2021 11:28)  Spiriva 18 mcg inhalation capsule: 1 cap(s) inhaled once a day AM (12 Apr 2021 11:28)  Trintellix 10 mg oral tablet: 1 tab(s) orally once a day AM (12 Apr 2021 11:28)  zolpidem: 12.5  orally once a day (at bedtime) -Insomnia MDD:2 tabs (12 Apr 2021 11:28)      MEDICATIONS  (STANDING):  budesonide 160 MICROgram(s)/formoterol 4.5 MICROgram(s) Inhaler 2 Puff(s) Inhalation two times a day.   gabapentin 300 milliGRAM(s) Oral at bedtime  heparin   Injectable 5000 Unit(s) SubCutaneous every 8 hours  lactated ringers. 1000 milliLiter(s) (75 mL/Hr) IV Continuous <Continuous>  lactated ringers. 1000 milliLiter(s) (30 mL/Hr) IV Continuous <Continuous>  tamsulosin 0.4 milliGRAM(s) Oral at bedtime  tiotropium 18 MICROgram(s) Capsule 1 Capsule(s) Inhalation daily    MEDICATIONS  (PRN):      Physical exam:                             General:               Pt is awake, alert, not in any distress                                                  Neuro:                  Nonfocal                             Cardiovascular:   S1 & S2, regular                           Respiratory:         Air entry is fair and equal on both sides, has bilateral conducted sounds                           GI:                          Soft, nondistended and nontender, Bowel sounds hypoactive                            Ext:                        No cyanosis or edema     Labs:                                                                 CXR:    Plan:    General: 55yMale s/p Robot-assisted laparoscopic repair of recurrent hiatal hernia, revision of Toupet fundoplication 4/12/2021, progressing well, experiencing  pain with deep breathing.                             Neuro:                                          Pain control with PCA / IV Tylenol     Depression / Anxiety: Patient may take his own serotonin modulator - Trintellix                            Cardiovascular:                                           Continue hemodynamic monitoring.                                         Continue IVF LR 75cc/hr                            Respiratory:                                          Pt is on 2L  nasal canula, wean off as tolerated                                           Appears to be in pain but not in distress.                                          Encourage incentive spirometry                                                                 Asthma: Continue bronchodilators, inhaled steroids and pulmonary toilet - PRN                            GI                                          NPO except sips of water / ice chips                                          GERD: Continue  Protonix                                          Continue Zofran / Reglan for nausea - PRN                                          Barium swallow in AM       Hx of Gastroparesis:  May restart Linzess after swallow study	                                                                 Renal:                                          Continue LR 75cc/hr                                          Monitor I/Os and electrolytes                                          Thorne inserted in the O.R, has some bloody urine  Give a dose of Flomax tonight and d/c thorne in AM                                                 Hem/ Onc:                                                                                   Monitor for signs of bleeding.                                           Follow CBC in AM                           Infectious disease:                                             No signs of infection. Monitor for fever / leukocytosis.                                           All surgical incision / chest tube  sites look clean.                            Endocrine                                             DM-2: Continue Accu-Checks with coverage.     Pt is on SQ Heparin and Venodyne boots for DVT prophylaxis.     Pertinent clinical, laboratory, radiographic, hemodynamic, echocardiographic, respiratory data, microbiologic data and chart were reviewed and analyzed frequently throughout the course of the day and night  Patient seen, examined and plan discussed with CT Surgeon Dr. Javier  / CTICU team during rounds.    Status discussed with patient at bedside, updated plan of care        Kevin Dong MD                                                                    
Anesthesia Pain Management Service    SUBJECTIVE: Patient is doing well with IV PCA and no significant problems reported.  Patient states that he is not sure if the IV PCA helps.    Pain Scale Score	At rest: _6-7/10__ 	With Activity: ___ 	[X ] Refer to charted pain scores    THERAPY:    [ ] IV PCA Morphine		[ ] 5 mg/mL	[ ] 1 mg/mL  [X ] IV PCA Hydromorphone	[ ] 5 mg/mL	[X ] 1 mg/mL  [ ] IV PCA Fentanyl		[ ] 50 micrograms/mL    Demand dose __0.2_ lockout __6_ (minutes) Continuous Rate _0__ Total: __4.9  mg used (in past 24 hours)      MEDICATIONS  (STANDING):  acetaminophen  IVPB .. 750 milliGRAM(s) IV Intermittent once  budesonide 160 MICROgram(s)/formoterol 4.5 MICROgram(s) Inhaler 2 Puff(s) Inhalation two times a day  gabapentin 300 milliGRAM(s) Oral at bedtime  heparin   Injectable 5000 Unit(s) SubCutaneous every 8 hours  HYDROmorphone PCA (1 mG/mL) 30 milliLiter(s) PCA Continuous PCA Continuous  lactated ringers. 1000 milliLiter(s) (75 mL/Hr) IV Continuous <Continuous>  tamsulosin 0.4 milliGRAM(s) Oral at bedtime  tiotropium 18 MICROgram(s) Capsule 1 Capsule(s) Inhalation daily    MEDICATIONS  (PRN):  HYDROmorphone PCA (1 mG/mL) Rescue Clinician Bolus 0.5 milliGRAM(s) IV Push every 15 minutes PRN for Pain Scale GREATER THAN 6  nalbuphine Injectable 2.5 milliGRAM(s) IV Push every 6 hours PRN Pruritus  naloxone Injectable 0.1 milliGRAM(s) IV Push every 3 minutes PRN For ANY of the following changes in patient status:  A. RR LESS THAN 10 breaths per minute, B. Oxygen saturation LESS THAN 90%, C. Sedation score of 6  ondansetron Injectable 4 milliGRAM(s) IV Push every 6 hours PRN Nausea      OBJECTIVE:  Patient is sitting up in chair, NPO.    Sedation Score:	[ X] Alert	 [ ] Drowsy 	[ ] Arousable	[ ] Asleep	[ ] Unresponsive    Side Effects:	[X ] None	[ ] Nausea	[ ] Vomiting	[ ] Pruritus  		[ ] Other:    Vital Signs Last 24 Hrs  T(C): 36.6 (13 Apr 2021 09:00), Max: 36.8 (12 Apr 2021 17:55)  T(F): 97.8 (13 Apr 2021 09:00), Max: 98.3 (12 Apr 2021 17:55)  HR: 98 (13 Apr 2021 09:00) (66 - 101)  BP: 136/87 (13 Apr 2021 09:00) (111/60 - 138/75)  BP(mean): 99 (13 Apr 2021 09:00) (69 - 99)  RR: 20 (13 Apr 2021 09:00) (11 - 22)  SpO2: 98% (13 Apr 2021 08:00) (95% - 100%)    ASSESSMENT/ PLAN    Therapy to  be:	[ X] Continue   [ ] Discontinued   [ ] Change to prn Analgesics    Documentation and Verification of current medications:   [X] Done	[ ] Not done, not elligible    Comments: Will continue with IV Dilaudid PCA. Informed RN to increase demand if needed. Added IV Tylenol. Recommend non-opioid adjuvant analgesics to be used when possible and when allowed by primary surgical team.    
MARK GARCIA                     [id number]    HPI:    54 y/o male w/ PMH of PE (7/2018, on Eliquis), Asthma, HTN, gastroparesis due to H/o type II DM  & vagal nerve disorder h/o placement of gastric pacemaker  on 10/22/18. Pt with c/o bloating delayed gastric emptying, fullness, nausea & intermittent hick ups due to recurrent hiatal hernia.  scheduled for upper endoscopy, robotic assisted laparoscopic recurrent hiatal hernia repair, possible left thoracotomy.      Procedure: Robot-assisted laparoscopic repair of recurrent hiatal hernia, revision of Toupet fundoplication 4/12/2021      Issues:              Hiatal Hernia              Postop pain  Depression / Anxiety  GERD  Hx of PE  DM-2  Hx of Gastroparesis  Asthma                     PAST MEDICAL & SURGICAL HISTORY:  HTN (hypertension)    Reactive airway disease    Asthma    GERD (gastroesophageal reflux disease)    Hiatal hernia    Diabetes mellitus    Anxiety  PTSD - [employer] [facility] responnder    Low testosterone in male    Osteoarthritis    Intervertebral disc disorder  bulging discs cervical and lumbar region    Shoulder pain, left    Depression    PE (pulmonary thromboembolism)    Insomnia    Gastroparesis    COVID-19 virus infection  h/o 4/2020    COVID-19 vaccine series completed    H/O submucous nasal surgery  septoplasty    S/P laparoscopic fundoplication  2x    S/P Achilles tendon repair  right - done 2x    S/P carpal tunnel release  right    S/P tonsillectomy and adenoidectomy    H/O hiatal hernia  repaired    Presence of gastric pacemaker  2018              VITAL SIGNS:  Vital Signs Last 24 Hrs  T(C): 36.6 (13 Apr 2021 09:00), Max: 36.8 (12 Apr 2021 17:55)  T(F): 97.8 (13 Apr 2021 09:00), Max: 98.3 (12 Apr 2021 17:55)  HR: 98 (13 Apr 2021 09:00) (66 - 101)  BP: 136/87 (13 Apr 2021 09:00) (111/60 - 138/75)  BP(mean): 99 (13 Apr 2021 09:00) (69 - 99)  RR: 20 (13 Apr 2021 09:00) (11 - 22)  SpO2: 98% (13 Apr 2021 08:00) (95% - 100%)    I/Os:   I&O's Detail    12 Apr 2021 07:01  -  13 Apr 2021 07:00  --------------------------------------------------------  IN:    IV PiggyBack: 200 mL    Lactated Ringers: 900 mL    Lactated Ringers: 60 mL    Oral Fluid: 60 mL  Total IN: 1220 mL    OUT:    Indwelling Catheter - Urethral (mL): 2525 mL    Voided (mL): 150 mL  Total OUT: 2675 mL    Total NET: -1455 mL      13 Apr 2021 07:01  -  13 Apr 2021 10:19  --------------------------------------------------------  IN:    Lactated Ringers: 225 mL    Oral Fluid: 250 mL  Total IN: 475 mL    OUT:  Total OUT: 0 mL    Total NET: 475 mL          CAPILLARY BLOOD GLUCOSE      POCT Blood Glucose.: 118 mg/dL (13 Apr 2021 05:16)  POCT Blood Glucose.: 156 mg/dL (12 Apr 2021 23:48)      =======================MEDICATIONS===================  MEDICATIONS  (STANDING):  acetaminophen  IVPB .. 750 milliGRAM(s) IV Intermittent once  budesonide 160 MICROgram(s)/formoterol 4.5 MICROgram(s) Inhaler 2 Puff(s) Inhalation two times a day  gabapentin 300 milliGRAM(s) Oral at bedtime  heparin   Injectable 5000 Unit(s) SubCutaneous every 8 hours  HYDROmorphone PCA (1 mG/mL) 30 milliLiter(s) PCA Continuous PCA Continuous  lactated ringers. 1000 milliLiter(s) (75 mL/Hr) IV Continuous <Continuous>  tamsulosin 0.4 milliGRAM(s) Oral at bedtime  tiotropium 18 MICROgram(s) Capsule 1 Capsule(s) Inhalation daily    MEDICATIONS  (PRN):  HYDROmorphone PCA (1 mG/mL) Rescue Clinician Bolus 0.5 milliGRAM(s) IV Push every 15 minutes PRN for Pain Scale GREATER THAN 6  nalbuphine Injectable 2.5 milliGRAM(s) IV Push every 6 hours PRN Pruritus  naloxone Injectable 0.1 milliGRAM(s) IV Push every 3 minutes PRN For ANY of the following changes in patient status:  A. RR LESS THAN 10 breaths per minute, B. Oxygen saturation LESS THAN 90%, C. Sedation score of 6  ondansetron Injectable 4 milliGRAM(s) IV Push every 6 hours PRN Nausea      PHYSICAL EXAM============================  General:                         Awake, alert, not in any distress  Neuro:                            Moving all extremities to commands.   Respiratory:	Air entry fair and  bilateral conducted sounds                                           Effort even and unlabored.  CV:		Regular rate and rhythm. Normal S1/S2                                          Distal pulses present.  Abdomen:	                     Soft, non-distended. Bowel sounds present   Skin:		No rash.  Extremities:	Warm, no cyanosis or edema.  Palpable pulses    ============================LABS=========================                        14.7   13.17 )-----------( 241      ( 13 Apr 2021 05:36 )             43.8     04-13    140  |  103  |  10  ----------------------------<  131<H>  4.3   |  27  |  1.03    Ca    9.5      13 Apr 2021 05:36  Phos  3.6     04-13  Mg     2.0     04-13      A/P:  55yMale s/p Robot-assisted laparoscopic repair of recurrent hiatal hernia, revision of Toupet fundoplication 4/12/2021, progressing well, experiencing  pain with deep breathing.                             Neuro:                                          Pain control with PCA / IV Tylenol     Depression / Anxiety: Patient may take his own serotonin modulator - Trintellix                            Cardiovascular:                                           Continue hemodynamic monitoring.                                         Continue IVF LR 75cc/hr                            Respiratory:                                          Pt is on 2L  nasal canula, wean off as tolerated                                           Appears to be in pain but not in distress.                                          Encourage incentive spirometry                                                                 Asthma: Continue bronchodilators, inhaled steroids and pulmonary toilet - PRN                            GI                                          NPO except sips of water / ice chips                                          GERD: Continue  Protonix                                          Continue Zofran / Reglan for nausea - PRN                                          Barium swallow today      Hx of Gastroparesis:  May restart Linzess after swallow study	                                                                 Renal:                                          Continue LR 75cc/hr                                          Monitor I/Os and electrolytes                                          Thorne inserted in the O.R, has some bloody urine  Give a dose of Flomax tonight and d/c thorne in AM                                                 Hem/ Onc:                                                                                   Monitor for signs of bleeding.                                           Follow CBC in AM                           Infectious disease:                                             No signs of infection. Monitor for fever / leukocytosis.                                           All surgical incision / chest tube  sites look clean.                            Endocrine                                             DM-2: Continue Accu-Checks with coverage.     Pt is on SQ Heparin and Venodyne boots for DVT prophylaxis.     Pertinent clinical, laboratory, radiographic, hemodynamic, echocardiographic, respiratory data, microbiologic data and chart were reviewed and analyzed frequently throughout the course of the day and night  Patient seen, examined and plan discussed with CT Surgeon Dr. Javier  / CTICU team during rounds.    Status discussed with patient at bedside, updated plan of care      Criselda Sahni DO, FACEP

## 2021-04-13 NOTE — DISCHARGE NOTE PROVIDER - HOSPITAL COURSE
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 Pt with c/o bloating delayed gastric emptying, fullness, nausea & intermittent hick ups due to recurrent hiatal hernia.  scheduled for upper endoscopy, robotic assisted laparoscopic recurrent hiatal hernia repair, possible left thoracotomy.  Patient underwent reop Robot-assisted laparoscopic repair of recurrent hiatal hernia, revision of Toupet fundoplication on 04/12/21.  Postop swallow study revealed 54 y/o male w/ PMH of PE (7/2018, on Eliquis), Asthma, HTN, gastroparesis due to H/o type II DM  & vagal nerve disorder h/o placement of gastric pacemaker  on 10/22/18. Pt with c/o bloating delayed gastric emptying, fullness, nausea & intermittent hick ups due to recurrent hiatal hernia.  scheduled for upper endoscopy, robotic assisted laparoscopic recurrent hiatal hernia repair, possible left thoracotomy.  Patient underwent reop Robot-assisted laparoscopic repair of recurrent hiatal hernia, revision of Toupet fundoplication on 04/12/21 and discharged home next day.

## 2021-04-13 NOTE — DISCHARGE NOTE PROVIDER - CARE PROVIDER_API CALL
Dillon Javier (MD)  Surgery; Thoracic Surgery  [phone] [place] [address]  Phone: [phone]  Fax: [phone]  Follow Up Time:

## 2021-04-13 NOTE — DISCHARGE NOTE PROVIDER - NSDCCPCAREPLAN_GEN_ALL_CORE_FT
PRINCIPAL DISCHARGE DIAGNOSIS  Diagnosis: Hiatal hernia with GERD  Assessment and Plan of Treatment:

## 2021-04-13 NOTE — DISCHARGE NOTE NURSING/CASE MANAGEMENT/SOCIAL WORK - PATIENT PORTAL LINK FT
You can access the FollowMyHealth Patient Portal offered by [facility] by registering at the following website: [email or web address]. By joining [facility]’s FollowMyHealth portal, you will also be able to view your health information using other applications (apps) compatible with our system.

## 2021-04-29 ENCOUNTER — APPOINTMENT (OUTPATIENT)
Dept: THORACIC SURGERY | Facility: CLINIC | Age: 56
End: 2021-04-29
Payer: MEDICARE

## 2021-04-29 ENCOUNTER — NON-APPOINTMENT (OUTPATIENT)
Age: 56
End: 2021-04-29

## 2021-04-29 VITALS
TEMPERATURE: 98.6 F | HEART RATE: 74 BPM | BODY MASS INDEX: 25.6 KG/M2 | HEIGHT: 72 IN | OXYGEN SATURATION: 98 % | SYSTOLIC BLOOD PRESSURE: 113 MMHG | DIASTOLIC BLOOD PRESSURE: 75 MMHG | WEIGHT: 189 LBS

## 2021-04-29 PROCEDURE — 99024 POSTOP FOLLOW-UP VISIT: CPT

## 2021-05-16 ENCOUNTER — EMERGENCY (EMERGENCY)
Facility: HOSPITAL | Age: 56
LOS: 1 days | Discharge: HOME | End: 2021-05-16
Attending: STUDENT IN AN ORGANIZED HEALTH CARE EDUCATION/TRAINING PROGRAM | Admitting: STUDENT IN AN ORGANIZED HEALTH CARE EDUCATION/TRAINING PROGRAM
Payer: COMMERCIAL

## 2021-05-16 VITALS
SYSTOLIC BLOOD PRESSURE: 126 MMHG | RESPIRATION RATE: 17 BRPM | DIASTOLIC BLOOD PRESSURE: 77 MMHG | TEMPERATURE: 98 F | HEART RATE: 64 BPM | OXYGEN SATURATION: 100 %

## 2021-05-16 VITALS
TEMPERATURE: 98 F | DIASTOLIC BLOOD PRESSURE: 89 MMHG | HEART RATE: 74 BPM | HEIGHT: 72 IN | OXYGEN SATURATION: 100 % | SYSTOLIC BLOOD PRESSURE: 137 MMHG | RESPIRATION RATE: 16 BRPM

## 2021-05-16 DIAGNOSIS — Z98.890 OTHER SPECIFIED POSTPROCEDURAL STATES: Chronic | ICD-10-CM

## 2021-05-16 DIAGNOSIS — Z87.19 PERSONAL HISTORY OF OTHER DISEASES OF THE DIGESTIVE SYSTEM: Chronic | ICD-10-CM

## 2021-05-16 DIAGNOSIS — Z96.89 PRESENCE OF OTHER SPECIFIED FUNCTIONAL IMPLANTS: Chronic | ICD-10-CM

## 2021-05-16 DIAGNOSIS — Z90.89 ACQUIRED ABSENCE OF OTHER ORGANS: Chronic | ICD-10-CM

## 2021-05-16 LAB
ALBUMIN SERPL ELPH-MCNC: 4.4 G/DL — SIGNIFICANT CHANGE UP (ref 3.3–5)
ALP SERPL-CCNC: 68 U/L — SIGNIFICANT CHANGE UP (ref 40–120)
ALT FLD-CCNC: 37 U/L — SIGNIFICANT CHANGE UP (ref 4–41)
ANION GAP SERPL CALC-SCNC: 11 MMOL/L — SIGNIFICANT CHANGE UP (ref 7–14)
APTT BLD: 37.6 SEC — HIGH (ref 27–36.3)
AST SERPL-CCNC: 27 U/L — SIGNIFICANT CHANGE UP (ref 4–40)
B PERT DNA SPEC QL NAA+PROBE: SIGNIFICANT CHANGE UP
BASOPHILS # BLD AUTO: 0.02 K/UL — SIGNIFICANT CHANGE UP (ref 0–0.2)
BASOPHILS NFR BLD AUTO: 0.2 % — SIGNIFICANT CHANGE UP (ref 0–2)
BILIRUB SERPL-MCNC: 0.8 MG/DL — SIGNIFICANT CHANGE UP (ref 0.2–1.2)
BLD GP AB SCN SERPL QL: NEGATIVE — SIGNIFICANT CHANGE UP
BLOOD GAS VENOUS COMPREHENSIVE RESULT: SIGNIFICANT CHANGE UP
BUN SERPL-MCNC: 9 MG/DL — SIGNIFICANT CHANGE UP (ref 7–23)
C PNEUM DNA SPEC QL NAA+PROBE: SIGNIFICANT CHANGE UP
CALCIUM SERPL-MCNC: 9.7 MG/DL — SIGNIFICANT CHANGE UP (ref 8.4–10.5)
CHLORIDE SERPL-SCNC: 102 MMOL/L — SIGNIFICANT CHANGE UP (ref 98–107)
CO2 SERPL-SCNC: 27 MMOL/L — SIGNIFICANT CHANGE UP (ref 22–31)
CREAT SERPL-MCNC: 1.23 MG/DL — SIGNIFICANT CHANGE UP (ref 0.5–1.3)
EOSINOPHIL # BLD AUTO: 0.2 K/UL — SIGNIFICANT CHANGE UP (ref 0–0.5)
EOSINOPHIL NFR BLD AUTO: 2.1 % — SIGNIFICANT CHANGE UP (ref 0–6)
FLUAV SUBTYP SPEC NAA+PROBE: SIGNIFICANT CHANGE UP
FLUBV RNA SPEC QL NAA+PROBE: SIGNIFICANT CHANGE UP
GLUCOSE SERPL-MCNC: 133 MG/DL — HIGH (ref 70–99)
HADV DNA SPEC QL NAA+PROBE: SIGNIFICANT CHANGE UP
HCOV 229E RNA SPEC QL NAA+PROBE: SIGNIFICANT CHANGE UP
HCOV HKU1 RNA SPEC QL NAA+PROBE: SIGNIFICANT CHANGE UP
HCOV NL63 RNA SPEC QL NAA+PROBE: SIGNIFICANT CHANGE UP
HCOV OC43 RNA SPEC QL NAA+PROBE: SIGNIFICANT CHANGE UP
HCT VFR BLD CALC: 40.7 % — SIGNIFICANT CHANGE UP (ref 39–50)
HGB BLD-MCNC: 14 G/DL — SIGNIFICANT CHANGE UP (ref 13–17)
HMPV RNA SPEC QL NAA+PROBE: SIGNIFICANT CHANGE UP
HPIV1 RNA SPEC QL NAA+PROBE: SIGNIFICANT CHANGE UP
HPIV2 RNA SPEC QL NAA+PROBE: SIGNIFICANT CHANGE UP
HPIV3 RNA SPEC QL NAA+PROBE: SIGNIFICANT CHANGE UP
HPIV4 RNA SPEC QL NAA+PROBE: SIGNIFICANT CHANGE UP
IANC: 6.93 K/UL — SIGNIFICANT CHANGE UP (ref 1.5–8.5)
IMM GRANULOCYTES NFR BLD AUTO: 0.7 % — SIGNIFICANT CHANGE UP (ref 0–1.5)
INR BLD: 1.2 RATIO — HIGH (ref 0.88–1.16)
LIDOCAIN IGE QN: 21 U/L — SIGNIFICANT CHANGE UP (ref 7–60)
LYMPHOCYTES # BLD AUTO: 1.53 K/UL — SIGNIFICANT CHANGE UP (ref 1–3.3)
LYMPHOCYTES # BLD AUTO: 15.9 % — SIGNIFICANT CHANGE UP (ref 13–44)
MCHC RBC-ENTMCNC: 30.2 PG — SIGNIFICANT CHANGE UP (ref 27–34)
MCHC RBC-ENTMCNC: 34.4 GM/DL — SIGNIFICANT CHANGE UP (ref 32–36)
MCV RBC AUTO: 87.9 FL — SIGNIFICANT CHANGE UP (ref 80–100)
MONOCYTES # BLD AUTO: 0.86 K/UL — SIGNIFICANT CHANGE UP (ref 0–0.9)
MONOCYTES NFR BLD AUTO: 8.9 % — SIGNIFICANT CHANGE UP (ref 2–14)
NEUTROPHILS # BLD AUTO: 6.93 K/UL — SIGNIFICANT CHANGE UP (ref 1.8–7.4)
NEUTROPHILS NFR BLD AUTO: 72.2 % — SIGNIFICANT CHANGE UP (ref 43–77)
NRBC # BLD: 0 /100 WBCS — SIGNIFICANT CHANGE UP
NRBC # FLD: 0 K/UL — SIGNIFICANT CHANGE UP
PLATELET # BLD AUTO: 240 K/UL — SIGNIFICANT CHANGE UP (ref 150–400)
POTASSIUM SERPL-MCNC: 3.8 MMOL/L — SIGNIFICANT CHANGE UP (ref 3.5–5.3)
POTASSIUM SERPL-SCNC: 3.8 MMOL/L — SIGNIFICANT CHANGE UP (ref 3.5–5.3)
PROT SERPL-MCNC: 7.3 G/DL — SIGNIFICANT CHANGE UP (ref 6–8.3)
PROTHROM AB SERPL-ACNC: 13.7 SEC — HIGH (ref 10.6–13.6)
RAPID RVP RESULT: SIGNIFICANT CHANGE UP
RBC # BLD: 4.63 M/UL — SIGNIFICANT CHANGE UP (ref 4.2–5.8)
RBC # FLD: 14.2 % — SIGNIFICANT CHANGE UP (ref 10.3–14.5)
RH IG SCN BLD-IMP: POSITIVE — SIGNIFICANT CHANGE UP
RSV RNA SPEC QL NAA+PROBE: SIGNIFICANT CHANGE UP
RV+EV RNA SPEC QL NAA+PROBE: SIGNIFICANT CHANGE UP
SARS-COV-2 RNA SPEC QL NAA+PROBE: SIGNIFICANT CHANGE UP
SODIUM SERPL-SCNC: 140 MMOL/L — SIGNIFICANT CHANGE UP (ref 135–145)
WBC # BLD: 9.61 K/UL — SIGNIFICANT CHANGE UP (ref 3.8–10.5)
WBC # FLD AUTO: 9.61 K/UL — SIGNIFICANT CHANGE UP (ref 3.8–10.5)

## 2021-05-16 PROCEDURE — 74177 CT ABD & PELVIS W/CONTRAST: CPT | Mod: 26

## 2021-05-16 PROCEDURE — 99285 EMERGENCY DEPT VISIT HI MDM: CPT

## 2021-05-16 RX ORDER — HYDROMORPHONE HYDROCHLORIDE 2 MG/ML
1 INJECTION INTRAMUSCULAR; INTRAVENOUS; SUBCUTANEOUS ONCE
Refills: 0 | Status: DISCONTINUED | OUTPATIENT
Start: 2021-05-16 | End: 2021-05-16

## 2021-05-16 RX ORDER — ONDANSETRON 8 MG/1
4 TABLET, FILM COATED ORAL ONCE
Refills: 0 | Status: COMPLETED | OUTPATIENT
Start: 2021-05-16 | End: 2021-05-16

## 2021-05-16 RX ORDER — SODIUM CHLORIDE 9 MG/ML
1000 INJECTION, SOLUTION INTRAVENOUS ONCE
Refills: 0 | Status: COMPLETED | OUTPATIENT
Start: 2021-05-16 | End: 2021-05-16

## 2021-05-16 RX ADMIN — HYDROMORPHONE HYDROCHLORIDE 1 MILLIGRAM(S): 2 INJECTION INTRAMUSCULAR; INTRAVENOUS; SUBCUTANEOUS at 14:40

## 2021-05-16 RX ADMIN — SODIUM CHLORIDE 1000 MILLILITER(S): 9 INJECTION, SOLUTION INTRAVENOUS at 14:16

## 2021-05-16 RX ADMIN — ONDANSETRON 4 MILLIGRAM(S): 8 TABLET, FILM COATED ORAL at 14:40

## 2021-05-16 NOTE — ED ADULT NURSE NOTE - OBJECTIVE STATEMENT
Pt. A&OX4, c/o mid-abdominal pain with nausea and diarrhea worsening over the past few days. States he had a surgical Fundoplication and hernia repair on 4/16. Denies vomiting or fevers. #20g IV placed to right AC, labs sent. MD at bedside for eval. Vitals stable. Will continue to monitor.

## 2021-05-16 NOTE — ED PROVIDER NOTE - ATTENDING CONTRIBUTION TO CARE
56M with pmh HTN, PE on Eliquis, asthma, HTN, gastroparesis, DM with gastroparesis and gastric pacemaker, fundoplication on 4/12/21 presenting with diffuse abdominal pain with associated nausea vomiting. Endorses passing gas, last BM yesterday night. Denies any fever, chills, cp, sob, dizziness, headache, weakness, numbness    GEN: appears very uncomfortable   HEENT: NCAT, MMM, normal conjunctiva, perrl  CHEST/LUNGS: Non-tachypneic, CTAB, bilateral breath sounds  CARDIAC: Non-tachycardic, s1s2, normal perfusion, no peripheral edema  ABDOMEN: Soft, diffuse abdominal tenderness, No rebound/guarding  MSK: No joint tenderness, no gross deformity of extremities  SKIN: No rashes, no petechiae, no vesicles  NEURO: CN grossly intact, normal coordination, no focal motor or sensory deficits  PSYCH: Alert, appropriate, cooperative     Patient presenting with diffuse abdominal discomfort. No focality to exam. Recent fundoplication surgery. DDx includes post op complication, SBO. Screening labs, CT to assess with pain control and reassessment.

## 2021-05-16 NOTE — ED PROVIDER NOTE - OBJECTIVE STATEMENT
56M with PMH including HTN, PE in 2018 on Eliquis, asthma, HTN, gastroparesis, type II DM, vagal nerve disorder s/p gastric pacemaker on 10/22/18, s/p fundoplication for hernia on 4/12/21 presents to the ER with diffuse abdominal pain and nausea/vomiting x 1 day. Reports having a normal BM last night. Still passing gas. Denies any other symptoms.

## 2021-05-16 NOTE — ED PROVIDER NOTE - PATIENT PORTAL LINK FT
You can access the FollowMyHealth Patient Portal offered by [facility] by registering at the following website: [email or web address]. By joining [employer]’s FollowMyHealth portal, you will also be able to view your health information using other applications (apps) compatible with our system.

## 2021-05-16 NOTE — ED ADULT TRIAGE NOTE - CHIEF COMPLAINT QUOTE
C/c/ diffuse lower abdominal pain nausea, emesis. Pt post surgical Fundoplication and hernia repair laparoscopic Dr Dillon GAYTAN 5/12/21/ Last bowel movement last night, soft brown. denies dysuria or hematuria. Pt appeared uncomfortable. tripod positioning in triage area. History Gastric pacemaker 2018, bilateral PE 2019 on Eliquis.

## 2021-05-16 NOTE — ED PROVIDER NOTE - CLINICAL SUMMARY MEDICAL DECISION MAKING FREE TEXT BOX
Diffuse abd pain + N/V x 2d in pt with hx including DM, gastroparesis, vagal dysfunction w/ gastric pacemaker, recent fundoplication. Last BM last night, still passing gas. Pt HD stable, abd diffusely tender without rebound. Will assess for obstruction and other GI pathology with labs/CT. Meds & reassess.

## 2021-05-16 NOTE — ED PROVIDER NOTE - DATE/TIME 1
"Subjective   HPI Comments: Sully Yost is a 44 y.o.female who presents to the ED with c/o knee pain. Two weeks ago, the pt developed left knee weakness after getting out of bed. Since that time she has had pain in the knee that radiates up and down the leg and has had constant weakness. She has attempted to resolve the issue with stretching and activity, although she has had no resolve. Due to continued symptoms, she presents to the ED. She denies recent injury to the knee. The pt denies tingling or numbness in her lower extremities or any other acute sx at this time.      Patient is a 44 y.o. female presenting with knee pain.   History provided by:  Patient  Knee Pain   Location:  Knee  Time since incident:  2 weeks  Injury: no    Knee location:  L knee  Pain details:     Radiates to:  L leg    Duration:  2 weeks    Timing:  Constant    Progression:  Worsening  Dislocation: no    Foreign body present:  No foreign bodies  Tetanus status:  Unknown  Prior injury to area:  No  Relieved by:  Nothing  Worsened by:  Nothing  Ineffective treatments:  Movement (stretching, activity)  Associated symptoms: no back pain, no fever and no swelling        Review of Systems   Constitutional: Negative for chills, diaphoresis and fever.   Gastrointestinal: Negative for diarrhea, nausea and vomiting.   Musculoskeletal: Positive for arthralgias and myalgias. Negative for back pain.   All other systems reviewed and are negative.      Past Medical History:   Diagnosis Date   • Anxiety    • Asthma    • Migraine        Allergies   Allergen Reactions   • Penicillins      \"MY WHOLE FAMILY IS ALLERGIC AND I DONT WANT TO TAKE A CHANCE\"        Past Surgical History:   Procedure Laterality Date   • CYST REMOVAL         History reviewed. No pertinent family history.    Social History     Social History   • Marital status:      Spouse name: N/A   • Number of children: N/A   • Years of education: N/A     Social History Main Topics   • " Smoking status: Current Every Day Smoker     Packs/day: 0.50     Types: Cigarettes   • Smokeless tobacco: None   • Alcohol use Yes      Comment: OCCASIONALLY   • Drug use: No   • Sexual activity: Not Asked     Other Topics Concern   • None     Social History Narrative   • None         Objective   Physical Exam   Constitutional: She is oriented to person, place, and time. She appears well-developed and well-nourished. No distress.   HENT:   Head: Normocephalic and atraumatic.   Mouth/Throat: No oropharyngeal exudate.   Eyes: Conjunctivae are normal. No scleral icterus.   Neck: Normal range of motion. Neck supple. No JVD present.   Cardiovascular: Normal rate, regular rhythm and normal heart sounds.  Exam reveals no gallop and no friction rub.    No murmur heard.  Pulmonary/Chest: Effort normal and breath sounds normal. No respiratory distress. She has no wheezes. She has no rales.   Abdominal: Soft. Bowel sounds are normal. She exhibits no distension. There is no tenderness. There is no rebound and no guarding.   Musculoskeletal: Normal range of motion. She exhibits tenderness. She exhibits no edema.   TTP over the lateral left knee. Anterolateral and lateral of the knee she reported some pain. Anterior and posterior drawer sign elicited some pain.   Neurological: She is alert and oriented to person, place, and time.   Skin: Skin is warm and dry. She is not diaphoretic.   Psychiatric: She has a normal mood and affect. Her behavior is normal.   Nursing note and vitals reviewed.      Procedures         ED Course  ED Course     No results found for this or any previous visit (from the past 24 hour(s)).  Note: In addition to lab results from this visit, the labs listed above may include labs taken at another facility or during a different encounter within the last 24 hours. Please correlate lab times with ED admission and discharge times for further clarification of the services performed during this visit.    XR Knee 1  or 2 View Left   Final Result   Negative left knee series for age. Acute or advanced chronic   disease is not identified.       D:  09/21/2017   E:  09/21/2017       This report was finalized on 9/21/2017 6:26 PM by Dr. Joe Gaines MD.            Vitals:    09/21/17 1126 09/21/17 1128 09/21/17 1130 09/21/17 1137   BP: 136/82  113/87    BP Location:       Patient Position:       Pulse:    78   Resp:       Temp:       TempSrc:       SpO2:  99% 99%    Weight:       Height:         Medications - No data to display  ECG/EMG Results (last 24 hours)     ** No results found for the last 24 hours. **                        MDM  Number of Diagnoses or Management Options  Knee abrasion, left, initial encounter: new and requires workup  Sprain of anterior cruciate ligament of left knee, initial encounter: new and requires workup  Diagnosis management comments: Patient will be given knee immobilizer and crutches and will refer to orthopedic surgery for outpatient evaluation.     Exam concern for sprain versus partial tear of ACL.     Will prescribe short course of pain medication.        Amount and/or Complexity of Data Reviewed  Tests in the radiology section of CPT®: ordered and reviewed  Review and summarize past medical records: yes  Independent visualization of images, tracings, or specimens: yes    Patient Progress  Patient progress: stable      Final diagnoses:   Knee abrasion, left, initial encounter   Sprain of anterior cruciate ligament of left knee, initial encounter       Documentation assistance provided by jeff Allen.  Information recorded by the scribdon was done at my direction and has been verified and validated by me.     Nir Allen  09/21/17 0672       Marivel Singh MD  09/21/17 9681     16-May-2021 17:06

## 2021-05-16 NOTE — ED PROVIDER NOTE - NSFOLLOWUPINSTRUCTIONS_ED_ALL_ED_FT
Follow up with your gastroenterologist in 24-48 hours.   May take Tylenol as directed on the bottle for pain control.   Return to the ER if you develop any new or worsening symptoms such as fever, abdominal pain, vomiting, chest pain, shortness of breath, numbness, weakness, or visual changes.

## 2021-05-16 NOTE — ED PROVIDER NOTE - PROGRESS NOTE DETAILS
Pt feeling much better, pain free. CT pending. Pt remains pain free, abdomen non-tender, pt tolerating po. CT negative. pt will f/u with his GI doctor. strict return precautions given.

## 2021-05-16 NOTE — ED PROVIDER NOTE - PHYSICAL EXAMINATION
Gen: AAOx3, non-toxic  Head: NCAT  HEENT: EOMI, oral mucosa moist, normal conjunctiva  Lung: CTAB, no respiratory distress, no wheezes/rhonchi/rales B/L, speaking in full sentences  CV: RRR, no murmurs, rubs or gallops  Abd: soft, diffuse tenderness, no rebound, no guarding  MSK: no visible deformities  Neuro: No focal sensory or motor deficits  Skin: Warm, well perfused, no rash  Psych: normal affect.     Zev Farnsworth PGY3

## 2021-05-16 NOTE — ED PROVIDER NOTE - NS ED ROS FT
ROS:  GENERAL: No fever, no chills  EYES: no change in vision  HEENT: no trouble swallowing, no trouble speaking  CARDIAC: no chest pain  PULMONARY: no cough, no shortness of breath  GI: +abdominal pain +nausea +vomiting. no diarrhea, no constipation  : No dysuria, no frequency, no change in appearance, or odor of urine  SKIN: no rashes  NEURO: no headache, no weakness  MSK: No joint pain    Zev Farnsworth PGY3

## 2021-05-20 ENCOUNTER — APPOINTMENT (OUTPATIENT)
Dept: THORACIC SURGERY | Facility: CLINIC | Age: 56
End: 2021-05-20
Payer: MEDICARE

## 2021-05-20 VITALS
HEART RATE: 73 BPM | OXYGEN SATURATION: 98 % | SYSTOLIC BLOOD PRESSURE: 107 MMHG | DIASTOLIC BLOOD PRESSURE: 74 MMHG | WEIGHT: 185 LBS | RESPIRATION RATE: 17 BRPM | TEMPERATURE: 98.1 F | HEIGHT: 72 IN | BODY MASS INDEX: 25.06 KG/M2

## 2021-05-20 PROCEDURE — 99024 POSTOP FOLLOW-UP VISIT: CPT

## 2021-05-20 RX ORDER — GABAPENTIN 300 MG/1
300 CAPSULE ORAL
Qty: 30 | Refills: 0 | Status: DISCONTINUED | COMMUNITY
Start: 2021-02-26 | End: 2021-05-20

## 2021-05-25 ENCOUNTER — APPOINTMENT (OUTPATIENT)
Dept: SURGERY | Facility: CLINIC | Age: 56
End: 2021-05-25
Payer: COMMERCIAL

## 2021-05-25 PROCEDURE — 99214 OFFICE O/P EST MOD 30 MIN: CPT

## 2021-06-24 ENCOUNTER — APPOINTMENT (OUTPATIENT)
Dept: THORACIC SURGERY | Facility: CLINIC | Age: 56
End: 2021-06-24
Payer: COMMERCIAL

## 2021-06-24 VITALS
OXYGEN SATURATION: 99 % | DIASTOLIC BLOOD PRESSURE: 67 MMHG | HEART RATE: 71 BPM | SYSTOLIC BLOOD PRESSURE: 103 MMHG | BODY MASS INDEX: 25.33 KG/M2 | HEIGHT: 72 IN | TEMPERATURE: 98.1 F | WEIGHT: 187 LBS

## 2021-06-24 PROCEDURE — 99024 POSTOP FOLLOW-UP VISIT: CPT

## 2021-08-01 NOTE — H&P PST ADULT - BIRTH SEX
Implemented All Universal Safety Interventions:  [place] to call system. Call bell, personal items and telephone within reach. Instruct patient to call for assistance. Room bathroom lighting operational. Non-slip footwear when patient is off stretcher. Physically safe environment: no spills, clutter or unnecessary equipment. Stretcher in lowest position, wheels locked, appropriate side rails in place. Male

## 2021-08-03 NOTE — ASU PATIENT PROFILE, ADULT - AS SC BRADEN FRICTION
The access site was successfully closed using a (Device Closure Mynxgrip 6f/7f) closure device. (3) no apparent problem

## 2021-11-29 ENCOUNTER — RX RENEWAL (OUTPATIENT)
Age: 56
End: 2021-11-29

## 2021-12-14 ENCOUNTER — APPOINTMENT (OUTPATIENT)
Dept: RADIOLOGY | Facility: HOSPITAL | Age: 56
End: 2021-12-14

## 2021-12-16 ENCOUNTER — APPOINTMENT (OUTPATIENT)
Dept: THORACIC SURGERY | Facility: CLINIC | Age: 56
End: 2021-12-16
Payer: MEDICARE

## 2021-12-16 VITALS
RESPIRATION RATE: 15 BRPM | HEIGHT: 72 IN | OXYGEN SATURATION: 100 % | HEART RATE: 59 BPM | DIASTOLIC BLOOD PRESSURE: 68 MMHG | SYSTOLIC BLOOD PRESSURE: 111 MMHG | WEIGHT: 195 LBS | BODY MASS INDEX: 26.41 KG/M2

## 2021-12-16 PROCEDURE — 99214 OFFICE O/P EST MOD 30 MIN: CPT

## 2021-12-16 NOTE — PHYSICAL EXAM
[Auscultation Breath Sounds / Voice Sounds] : lungs were clear to auscultation bilaterally [Heart Rate And Rhythm] : heart rate was normal and rhythm regular [Heart Sounds] : normal S1 and S2 [Heart Sounds Gallop] : no gallops [Murmurs] : no murmurs [Heart Sounds Pericardial Friction Rub] : no pericardial rub [Examination Of The Chest] : the chest was normal in appearance [Chest Visual Inspection Thoracic Asymmetry] : no chest asymmetry [Diminished Respiratory Excursion] : normal chest expansion [Bowel Sounds] : normal bowel sounds [Abdomen Soft] : soft [Abdomen Tenderness] : non-tender [] : no hepato-splenomegaly [Abdomen Mass (___ Cm)] : no abdominal mass palpated

## 2021-12-17 NOTE — ASSESSMENT
[FreeTextEntry1] : Mr. 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 Cruz Herrera, s/p G-POEM in 2019, b/l PE on Eliquis, depression, anemia, DM (not on medications), who referred by Dr. Angel Lindo for evaluation of recurrent hiatal hernia. \par \par Patient is s/p hiatal hernia repair, Nissen fundoplication in 2011, and takedown of Nissen fundoplication, repair of recurrent hiatal hernia, revision fundoplication to partial Toupet fundoplication on 01/19/2016 by Dr. Edgard Whaley (General surgeon) at [place]. \par \par EGD on 1/4/21 by Dr. Lindo. Path of gastric antrum showed chronic inactive gastritis, negative H. Pylori. Gastric proximal biopsy showed chronic inactive gastritis, extensive intestinal and Paneth cell metaplasia, negative H. Pylori.\par \par Gastric emptying study on 1/26/21. Normal gastric emptying of liquids and solids; no evidence of gastroparesis.\par \par Barium Esophagram on 2/9/21 showed GE reflux and a small hiatal hernia. \par \par CT Chest on 3/11/21:\par - s/p Nissen fundoplication\par - small, 3.6 cm hiatal hernia\par \par Manometry on 4/9/21 by Dr. Lissette Fernandez. LES = 55.1 (13-43), UES = 95.2 (); 50% swallows w/ normal amplitude peristalsis, 20% swallows w/ low amplitude peristalsis, 30% w/ distal esophageal spasm. 80% swallows w/ incomplete bolus clearance by impedence analysis. No Hiatal Hernia. Study is c/w distal esophageal spasm.\par \par Now s/p 8 mo EGD, Robotic-assisted, Re-do Laparoscopy, extensive lysis of adhesions, repair of recurrent hiatal hernia repair on 4/12/2021.\par \par Patient presented to ED on 5/16/21 c/o severe pain and N/V, managed with morphine. CT A/P w/ IV contrast on 5/16/21 showed no acute intra-abdominal process.\par \par Patient was scheduled for Barium esophagram on 12/14/2021, patient was no show for his appointment. \par \par I have reviewed the patient's medical records and diagnostic images at time of this office consultation and have made the following recommendation:\par 1. Patient is doing well, I recommended patient to RTC in 3 months with Barium esophagram. \par \par I personally performed the services described in the documentation, reviewed the documentation recorded by the scribe in my presence and it accurately and completely records my words and actions.\par \par I, Jayla Ruiz NP, am scribing for and the presence of JACEK May, the following sections HISTORY OF PRESENT ILLNESS, PAST MEDICAL/FAMILY/SOCIAL HISTORY; REVIEW OF SYSTEMS; VITAL SIGNS; PHYSICAL EXAM; DISPOSITION.

## 2021-12-17 NOTE — CONSULT LETTER
[Courtesy Letter:] : I had the pleasure of seeing your patient, [unfilled], in my office today. [Please see my note below.] : Please see my note below. [Consult Closing:] : Thank you very much for allowing me to participate in the care of this patient.  If you have any questions, please do not hesitate to contact me. [Sincerely,] : Sincerely, [DrManfred  ___] : Dr. ROCHE [FreeTextEntry2] : Dr. Angel Lindo (GI/Referring)\par Dr. Lucero Del Angel (PCP) \par  [FreeTextEntry3] : Dillon Javier MD, MPH \par System Director of Thoracic Surgery \par Director of Comprehensive Lung and Foregut [facility] \par Professor Cardiovascular & Thoracic Surgery  \par [facility] [employer] [facility]\par \par [facility]\par [address]\par Oncology [address]\[place] [address]\par Tel: [phone]\par Fax: [phone]\par

## 2021-12-17 NOTE — HISTORY OF PRESENT ILLNESS
[FreeTextEntry1] : Mr. 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 Cruz Herrera, s/p G-POEM in 2019, b/l PE on Eliquis, depression, anemia, DM (not on medications), who referred by Dr. Angel Lindo for evaluation of recurrent hiatal hernia. \par \par Patient is s/p hiatal hernia repair, Nissen fundoplication in 2011, and takedown of Nissen fundoplication, repair of recurrent hiatal hernia, revision fundoplication to partial Toupet fundoplication on 01/19/2016 by Dr. Edgard Whaley (General surgeon) at [place]. \par \par EGD on 1/4/21 by Dr. Lindo. Path of gastric antrum showed chronic inactive gastritis, negative H. Pylori. Gastric proximal biopsy showed chronic inactive gastritis, extensive intestinal and Paneth cell metaplasia, negative H. Pylori.\par \par Gastric emptying study on 1/26/21. Normal gastric emptying of liquids and solids; no evidence of gastroparesis.\par \par Barium Esophagram on 2/9/21 showed GE reflux and a small hiatal hernia. \par \par CT Chest on 3/11/21:\par - s/p Nissen fundoplication\par - small, 3.6 cm hiatal hernia\par \par Manometry on 4/9/21 by Dr. Lissette Fernandez. LES = 55.1 (13-43), UES = 95.2 (); 50% swallows w/ normal amplitude peristalsis, 20% swallows w/ low amplitude peristalsis, 30% w/ distal esophageal spasm. 80% swallows w/ incomplete bolus clearance by impedence analysis. No Hiatal Hernia. Study is c/w distal esophageal spasm.\par \par Now s/p 8 mo EGD, Robotic-assisted, Re-do Laparoscopy, extensive lysis of adhesions, repair of recurrent hiatal hernia repair on 4/12/2021.\par \par Patient presented to ED on 5/16/21 c/o severe pain and N/V, managed with morphine. CT A/P w/ IV contrast on 5/16/21 showed no acute intra-abdominal process.\par \par Patient was scheduled for Barium esophagram on 12/14/2021, patient was no show for his appointment. \par \par Patient is here today for a follow up. He is currently on regular diet, small meals 3-4 x/day, c/o dysphagia occasionally, c/o reflux on and off, weight stable. Patient denies shortness of breath, cough, chest pain, fever, chills.

## 2022-01-08 ENCOUNTER — EMERGENCY (EMERGENCY)
Facility: HOSPITAL | Age: 57
LOS: 1 days | Discharge: HOME | End: 2022-01-08
Attending: EMERGENCY MEDICINE | Admitting: EMERGENCY MEDICINE
Payer: MEDICARE

## 2022-01-08 VITALS
TEMPERATURE: 99 F | DIASTOLIC BLOOD PRESSURE: 81 MMHG | OXYGEN SATURATION: 99 % | RESPIRATION RATE: 18 BRPM | HEIGHT: 72 IN | HEART RATE: 102 BPM | SYSTOLIC BLOOD PRESSURE: 115 MMHG

## 2022-01-08 DIAGNOSIS — Z98.890 OTHER SPECIFIED POSTPROCEDURAL STATES: Chronic | ICD-10-CM

## 2022-01-08 DIAGNOSIS — Z87.19 PERSONAL HISTORY OF OTHER DISEASES OF THE DIGESTIVE SYSTEM: Chronic | ICD-10-CM

## 2022-01-08 DIAGNOSIS — Z90.89 ACQUIRED ABSENCE OF OTHER ORGANS: Chronic | ICD-10-CM

## 2022-01-08 DIAGNOSIS — Z96.89 PRESENCE OF OTHER SPECIFIED FUNCTIONAL IMPLANTS: Chronic | ICD-10-CM

## 2022-01-08 LAB
ALBUMIN SERPL ELPH-MCNC: 4.6 G/DL — SIGNIFICANT CHANGE UP (ref 3.3–5)
ALP SERPL-CCNC: 63 U/L — SIGNIFICANT CHANGE UP (ref 40–120)
ALT FLD-CCNC: 38 U/L — SIGNIFICANT CHANGE UP (ref 4–41)
ANION GAP SERPL CALC-SCNC: 14 MMOL/L — SIGNIFICANT CHANGE UP (ref 7–14)
AST SERPL-CCNC: 30 U/L — SIGNIFICANT CHANGE UP (ref 4–40)
BASOPHILS # BLD AUTO: 0.01 K/UL — SIGNIFICANT CHANGE UP (ref 0–0.2)
BASOPHILS NFR BLD AUTO: 0.1 % — SIGNIFICANT CHANGE UP (ref 0–2)
BILIRUB SERPL-MCNC: 1.2 MG/DL — SIGNIFICANT CHANGE UP (ref 0.2–1.2)
BUN SERPL-MCNC: 11 MG/DL — SIGNIFICANT CHANGE UP (ref 7–23)
CALCIUM SERPL-MCNC: 9.6 MG/DL — SIGNIFICANT CHANGE UP (ref 8.4–10.5)
CHLORIDE SERPL-SCNC: 100 MMOL/L — SIGNIFICANT CHANGE UP (ref 98–107)
CO2 SERPL-SCNC: 23 MMOL/L — SIGNIFICANT CHANGE UP (ref 22–31)
CREAT SERPL-MCNC: 1.08 MG/DL — SIGNIFICANT CHANGE UP (ref 0.5–1.3)
EOSINOPHIL # BLD AUTO: 0.06 K/UL — SIGNIFICANT CHANGE UP (ref 0–0.5)
EOSINOPHIL NFR BLD AUTO: 0.6 % — SIGNIFICANT CHANGE UP (ref 0–6)
GLUCOSE SERPL-MCNC: 108 MG/DL — HIGH (ref 70–99)
HCT VFR BLD CALC: 41.4 % — SIGNIFICANT CHANGE UP (ref 39–50)
HGB BLD-MCNC: 14.4 G/DL — SIGNIFICANT CHANGE UP (ref 13–17)
IANC: 6.45 K/UL — SIGNIFICANT CHANGE UP (ref 1.5–8.5)
IMM GRANULOCYTES NFR BLD AUTO: 0.3 % — SIGNIFICANT CHANGE UP (ref 0–1.5)
LIDOCAIN IGE QN: 14 U/L — SIGNIFICANT CHANGE UP (ref 7–60)
LYMPHOCYTES # BLD AUTO: 1.87 K/UL — SIGNIFICANT CHANGE UP (ref 1–3.3)
LYMPHOCYTES # BLD AUTO: 20.1 % — SIGNIFICANT CHANGE UP (ref 13–44)
MCHC RBC-ENTMCNC: 30.3 PG — SIGNIFICANT CHANGE UP (ref 27–34)
MCHC RBC-ENTMCNC: 34.8 GM/DL — SIGNIFICANT CHANGE UP (ref 32–36)
MCV RBC AUTO: 87 FL — SIGNIFICANT CHANGE UP (ref 80–100)
MONOCYTES # BLD AUTO: 0.89 K/UL — SIGNIFICANT CHANGE UP (ref 0–0.9)
MONOCYTES NFR BLD AUTO: 9.6 % — SIGNIFICANT CHANGE UP (ref 2–14)
NEUTROPHILS # BLD AUTO: 6.45 K/UL — SIGNIFICANT CHANGE UP (ref 1.8–7.4)
NEUTROPHILS NFR BLD AUTO: 69.3 % — SIGNIFICANT CHANGE UP (ref 43–77)
NRBC # BLD: 0 /100 WBCS — SIGNIFICANT CHANGE UP
NRBC # FLD: 0 K/UL — SIGNIFICANT CHANGE UP
PLATELET # BLD AUTO: 203 K/UL — SIGNIFICANT CHANGE UP (ref 150–400)
POTASSIUM SERPL-MCNC: 3.3 MMOL/L — LOW (ref 3.5–5.3)
POTASSIUM SERPL-MCNC: 3.4 MMOL/L — LOW (ref 3.5–5.3)
POTASSIUM SERPL-SCNC: 3.3 MMOL/L — LOW (ref 3.5–5.3)
POTASSIUM SERPL-SCNC: 3.4 MMOL/L — LOW (ref 3.5–5.3)
PROT SERPL-MCNC: 7.1 G/DL — SIGNIFICANT CHANGE UP (ref 6–8.3)
RBC # BLD: 4.76 M/UL — SIGNIFICANT CHANGE UP (ref 4.2–5.8)
RBC # FLD: 13.2 % — SIGNIFICANT CHANGE UP (ref 10.3–14.5)
SODIUM SERPL-SCNC: 137 MMOL/L — SIGNIFICANT CHANGE UP (ref 135–145)
TROPONIN T, HIGH SENSITIVITY RESULT: 10 NG/L — SIGNIFICANT CHANGE UP
TROPONIN T, HIGH SENSITIVITY RESULT: 11 NG/L — SIGNIFICANT CHANGE UP
WBC # BLD: 9.31 K/UL — SIGNIFICANT CHANGE UP (ref 3.8–10.5)
WBC # FLD AUTO: 9.31 K/UL — SIGNIFICANT CHANGE UP (ref 3.8–10.5)

## 2022-01-08 PROCEDURE — 99284 EMERGENCY DEPT VISIT MOD MDM: CPT

## 2022-01-08 PROCEDURE — G1004: CPT

## 2022-01-08 PROCEDURE — 74177 CT ABD & PELVIS W/CONTRAST: CPT | Mod: 26,ME

## 2022-01-08 PROCEDURE — 71046 X-RAY EXAM CHEST 2 VIEWS: CPT | Mod: 26

## 2022-01-08 RX ORDER — POTASSIUM CHLORIDE 20 MEQ
40 PACKET (EA) ORAL ONCE
Refills: 0 | Status: COMPLETED | OUTPATIENT
Start: 2022-01-08 | End: 2022-01-08

## 2022-01-08 RX ORDER — MORPHINE SULFATE 50 MG/1
4 CAPSULE, EXTENDED RELEASE ORAL ONCE
Refills: 0 | Status: DISCONTINUED | OUTPATIENT
Start: 2022-01-08 | End: 2022-01-08

## 2022-01-08 RX ORDER — ONDANSETRON 8 MG/1
4 TABLET, FILM COATED ORAL ONCE
Refills: 0 | Status: COMPLETED | OUTPATIENT
Start: 2022-01-08 | End: 2022-01-08

## 2022-01-08 RX ORDER — POTASSIUM CHLORIDE 20 MEQ
10 PACKET (EA) ORAL ONCE
Refills: 0 | Status: COMPLETED | OUTPATIENT
Start: 2022-01-08 | End: 2022-01-08

## 2022-01-08 RX ORDER — SODIUM CHLORIDE 9 MG/ML
1000 INJECTION, SOLUTION INTRAVENOUS ONCE
Refills: 0 | Status: COMPLETED | OUTPATIENT
Start: 2022-01-08 | End: 2022-01-08

## 2022-01-08 RX ADMIN — Medication 100 MILLIEQUIVALENT(S): at 22:17

## 2022-01-08 RX ADMIN — Medication 40 MILLIEQUIVALENT(S): at 23:27

## 2022-01-08 RX ADMIN — ONDANSETRON 4 MILLIGRAM(S): 8 TABLET, FILM COATED ORAL at 20:18

## 2022-01-08 RX ADMIN — MORPHINE SULFATE 4 MILLIGRAM(S): 50 CAPSULE, EXTENDED RELEASE ORAL at 20:18

## 2022-01-08 RX ADMIN — MORPHINE SULFATE 4 MILLIGRAM(S): 50 CAPSULE, EXTENDED RELEASE ORAL at 21:23

## 2022-01-08 RX ADMIN — SODIUM CHLORIDE 1000 MILLILITER(S): 9 INJECTION, SOLUTION INTRAVENOUS at 20:18

## 2022-01-08 NOTE — ED PROVIDER NOTE - PATIENT PORTAL LINK FT
You can access the FollowMyHealth Patient Portal offered by [facility] by registering at the following website: [email or web address]. By joining [employer]’s FollowMyHealth portal, you will also be able to view your health information using other applications (apps) compatible with our system.

## 2022-01-08 NOTE — ED ADULT TRIAGE NOTE - CHIEF COMPLAINT QUOTE
Pt c/o vomiting several episodes x3 days with abdominal spasm. Pt states he has not been able to tolerate any PO intake. c/o dizziness. Hx of gastroparesis, esophageal varices, PE, COPD

## 2022-01-08 NOTE — ED PROVIDER NOTE - PROGRESS NOTE DETAILS
VICKEY Motley; Pt feeling better, tolerating po, discussed results  The patient was given verbal and written discharge instructions. Specifically, instructions when to return to the ED and when to seek follow-up from their pcp was discussed. Any specialty follow-up was discussed, including how to make an appointment.  Any necessary referral lists provided.  Instructions were discussed in simple, plain language and was understood by the patient. The patient understands that should their symptoms worsen or any new symptoms arise, they should return to the ED immediately for further evaluation. All pt's questions were answered. Patient verbalizes understanding.

## 2022-01-08 NOTE — ED PROVIDER NOTE - OBJECTIVE STATEMENT
55 y/o M pmh PE (7/2018, on Eliquis), Asthma, HTN, gastroparesis due to H/o type II DM  & vagal nerve disorder h/o placement of gastric pacemaker on 10/22/18, Robot-assisted laparoscopic repair of recurrent hiatal hernia, revision of Toupet fundoplication on 04/12/21, presents with abdominal pain and nausea x 3 days. similar to prior presentation. Abd pain is generalized that radiates to mid sternum, constant. took tylenol with no relief. Pt has not vomited but is frequently retching. Has not eaten in 3 days. Denies fever, chills, sob, diarrhea, dysuria, hematuria.

## 2022-01-08 NOTE — ED PROVIDER NOTE - CLINICAL SUMMARY MEDICAL DECISION MAKING FREE TEXT BOX
55 y/o M pmh PE (7/2018, on Eliquis), Asthma, HTN, gastroparesis due to H/o type II DM  & vagal nerve disorder h/o placement of gastric pacemaker on 10/22/18, Robot-assisted laparoscopic repair of recurrent hiatal hernia, revision of Toupet fundoplication on 04/12/21, presents with abdominal pain and nausea x 3 days.    abd pain and acs workup-  -labs, ekg, cxr, ctap, ivf, morphine, zofran

## 2022-01-08 NOTE — ED PROVIDER NOTE - ATTENDING CONTRIBUTION TO CARE
I have evaluated the patient and agree with the documentation and assessment as made by the PA. We have discussed plan of care and work up for the patient.    Brief HPI:  57 y/o M pmh PE (7/2018, on Eliquis), Asthma, HTN, gastroparesis due to H/o type II DM  & vagal nerve disorder h/o placement of gastric pacemaker on 10/22/18, Robot-assisted laparoscopic repair of recurrent hiatal hernia, revision of Toupet fundoplication on 04/12/21, presents with abdominal pain and nausea for 3 days.      Vitals:   Reviewed    Exam:    GEN:  Non-toxic appearing, non-distressed, speaking full sentences, non-diaphoretic, AAOx3  HEENT:  NCAT, neck supple, EOMI, PERRLA, sclera anicteric, no conjunctival pallor or injection, no stridor, normal voice, no tonsillar exudate, uvula midline, tympanic membranes and external auditory canals normal appearing bilaterally   CV:  regular rhythm and rate, s1/s2 audible, no murmurs, rubs or gallops, peripheral pulses 2+ and symmetric  PULM:  non-labored respirations, lungs clear to auscultation bilaterally, no wheezes, crackles or rales  ABD:  non distended, minimally and diffusely tender, no rebound, no guarding, negative Jade's sign, bowel sounds normal, no cvat  MSK:  no gross deformity, non-tender extremities and joints, range of motion grossly normal appearing, no extremity edema, extremities warm and well perfused   NEURO:  AAOx3, CN II-XII intact, motor 5/5 in upper and lower extremities bilaterally, sensation grossly intact in extremities and trunk  SKIN:  warm, dry, no rash or vesicles     A/P:  57 y/o M pmh PE (7/2018, on Eliquis), Asthma, HTN, gastroparesis due to H/o type II DM  & vagal nerve disorder h/o placement of gastric pacemaker on 10/22/18, Robot-assisted laparoscopic repair of recurrent hiatal hernia, revision of Toupet fundoplication on 04/12/21, presents with abdominal pain and nausea for 3 days.  VSS.  Abdomen diffusely tender but non-peritoneal.  Possible gastroparesis vs. obstructive pathology.  Plan for labs, ct, supportive care.  Dispo pending.

## 2022-01-09 VITALS
HEART RATE: 94 BPM | TEMPERATURE: 98 F | RESPIRATION RATE: 16 BRPM | DIASTOLIC BLOOD PRESSURE: 84 MMHG | SYSTOLIC BLOOD PRESSURE: 118 MMHG | OXYGEN SATURATION: 98 %

## 2022-01-09 LAB — SARS-COV-2 RNA SPEC QL NAA+PROBE: SIGNIFICANT CHANGE UP

## 2022-01-09 RX ORDER — ONDANSETRON 8 MG/1
1 TABLET, FILM COATED ORAL
Qty: 20 | Refills: 0
Start: 2022-01-09

## 2022-03-14 ENCOUNTER — INPATIENT (INPATIENT)
Facility: HOSPITAL | Age: 57
LOS: 5 days | Discharge: HOME | End: 2022-03-20
Attending: THORACIC SURGERY (CARDIOTHORACIC VASCULAR SURGERY) | Admitting: THORACIC SURGERY (CARDIOTHORACIC VASCULAR SURGERY)
Payer: COMMERCIAL

## 2022-03-14 VITALS
OXYGEN SATURATION: 100 % | HEIGHT: 72 IN | TEMPERATURE: 97 F | DIASTOLIC BLOOD PRESSURE: 90 MMHG | RESPIRATION RATE: 16 BRPM | HEART RATE: 88 BPM | SYSTOLIC BLOOD PRESSURE: 150 MMHG

## 2022-03-14 DIAGNOSIS — Z98.890 OTHER SPECIFIED POSTPROCEDURAL STATES: Chronic | ICD-10-CM

## 2022-03-14 DIAGNOSIS — Z96.89 PRESENCE OF OTHER SPECIFIED FUNCTIONAL IMPLANTS: Chronic | ICD-10-CM

## 2022-03-14 DIAGNOSIS — R10.13 EPIGASTRIC PAIN: ICD-10-CM

## 2022-03-14 DIAGNOSIS — K44.9 DIAPHRAGMATIC HERNIA WITHOUT OBSTRUCTION OR GANGRENE: ICD-10-CM

## 2022-03-14 DIAGNOSIS — I26.99 OTHER PULMONARY EMBOLISM WITHOUT ACUTE COR PULMONALE: ICD-10-CM

## 2022-03-14 DIAGNOSIS — Z90.89 ACQUIRED ABSENCE OF OTHER ORGANS: Chronic | ICD-10-CM

## 2022-03-14 DIAGNOSIS — Z87.19 PERSONAL HISTORY OF OTHER DISEASES OF THE DIGESTIVE SYSTEM: Chronic | ICD-10-CM

## 2022-03-14 LAB
ALBUMIN SERPL ELPH-MCNC: 4.5 G/DL — SIGNIFICANT CHANGE UP (ref 3.3–5)
ALP SERPL-CCNC: 71 U/L — SIGNIFICANT CHANGE UP (ref 40–120)
ALT FLD-CCNC: 70 U/L — HIGH (ref 4–41)
ANION GAP SERPL CALC-SCNC: 12 MMOL/L — SIGNIFICANT CHANGE UP (ref 7–14)
AST SERPL-CCNC: 48 U/L — HIGH (ref 4–40)
BASE EXCESS BLDV CALC-SCNC: 4.1 MMOL/L — HIGH (ref -2–3)
BASOPHILS # BLD AUTO: 0.03 K/UL — SIGNIFICANT CHANGE UP (ref 0–0.2)
BASOPHILS NFR BLD AUTO: 0.3 % — SIGNIFICANT CHANGE UP (ref 0–2)
BILIRUB SERPL-MCNC: 1.7 MG/DL — HIGH (ref 0.2–1.2)
BLOOD GAS VENOUS COMPREHENSIVE RESULT: SIGNIFICANT CHANGE UP
BUN SERPL-MCNC: 10 MG/DL — SIGNIFICANT CHANGE UP (ref 7–23)
CALCIUM SERPL-MCNC: 10.1 MG/DL — SIGNIFICANT CHANGE UP (ref 8.4–10.5)
CHLORIDE BLDV-SCNC: 101 MMOL/L — SIGNIFICANT CHANGE UP (ref 96–108)
CHLORIDE SERPL-SCNC: 100 MMOL/L — SIGNIFICANT CHANGE UP (ref 98–107)
CO2 BLDV-SCNC: 28.5 MMOL/L — HIGH (ref 22–26)
CO2 SERPL-SCNC: 24 MMOL/L — SIGNIFICANT CHANGE UP (ref 22–31)
CREAT SERPL-MCNC: 1.17 MG/DL — SIGNIFICANT CHANGE UP (ref 0.5–1.3)
EGFR: 73 ML/MIN/1.73M2 — SIGNIFICANT CHANGE UP
EOSINOPHIL # BLD AUTO: 0.06 K/UL — SIGNIFICANT CHANGE UP (ref 0–0.5)
EOSINOPHIL NFR BLD AUTO: 0.6 % — SIGNIFICANT CHANGE UP (ref 0–6)
GAS PNL BLDV: 136 MMOL/L — SIGNIFICANT CHANGE UP (ref 136–145)
GLUCOSE BLDV-MCNC: 141 MG/DL — HIGH (ref 70–99)
GLUCOSE SERPL-MCNC: 140 MG/DL — HIGH (ref 70–99)
HCO3 BLDV-SCNC: 27 MMOL/L — SIGNIFICANT CHANGE UP (ref 22–29)
HCT VFR BLD CALC: 43.5 % — SIGNIFICANT CHANGE UP (ref 39–50)
HCT VFR BLDA CALC: 46 % — SIGNIFICANT CHANGE UP (ref 39–51)
HGB BLD CALC-MCNC: 15.4 G/DL — SIGNIFICANT CHANGE UP (ref 13–17)
HGB BLD-MCNC: 15.3 G/DL — SIGNIFICANT CHANGE UP (ref 13–17)
IANC: 7.72 K/UL — SIGNIFICANT CHANGE UP (ref 1.5–8.5)
IMM GRANULOCYTES NFR BLD AUTO: 0.4 % — SIGNIFICANT CHANGE UP (ref 0–1.5)
LACTATE BLDV-MCNC: 2 MMOL/L — SIGNIFICANT CHANGE UP (ref 0.5–2)
LIDOCAIN IGE QN: 24 U/L — SIGNIFICANT CHANGE UP (ref 7–60)
LYMPHOCYTES # BLD AUTO: 1.48 K/UL — SIGNIFICANT CHANGE UP (ref 1–3.3)
LYMPHOCYTES # BLD AUTO: 14.6 % — SIGNIFICANT CHANGE UP (ref 13–44)
MAGNESIUM SERPL-MCNC: 1.9 MG/DL — SIGNIFICANT CHANGE UP (ref 1.6–2.6)
MCHC RBC-ENTMCNC: 30.8 PG — SIGNIFICANT CHANGE UP (ref 27–34)
MCHC RBC-ENTMCNC: 35.2 GM/DL — SIGNIFICANT CHANGE UP (ref 32–36)
MCV RBC AUTO: 87.5 FL — SIGNIFICANT CHANGE UP (ref 80–100)
MONOCYTES # BLD AUTO: 0.81 K/UL — SIGNIFICANT CHANGE UP (ref 0–0.9)
MONOCYTES NFR BLD AUTO: 8 % — SIGNIFICANT CHANGE UP (ref 2–14)
NEUTROPHILS # BLD AUTO: 7.72 K/UL — HIGH (ref 1.8–7.4)
NEUTROPHILS NFR BLD AUTO: 76.1 % — SIGNIFICANT CHANGE UP (ref 43–77)
NRBC # BLD: 0 /100 WBCS — SIGNIFICANT CHANGE UP
NRBC # FLD: 0 K/UL — SIGNIFICANT CHANGE UP
PCO2 BLDV: 36 MMHG — LOW (ref 42–55)
PH BLDV: 7.49 — HIGH (ref 7.32–7.43)
PHOSPHATE SERPL-MCNC: 1.5 MG/DL — LOW (ref 2.5–4.5)
PLATELET # BLD AUTO: 234 K/UL — SIGNIFICANT CHANGE UP (ref 150–400)
PO2 BLDV: <20 MMHG — SIGNIFICANT CHANGE UP
POTASSIUM BLDV-SCNC: 4.3 MMOL/L — SIGNIFICANT CHANGE UP (ref 3.5–5.1)
POTASSIUM SERPL-MCNC: 4.4 MMOL/L — SIGNIFICANT CHANGE UP (ref 3.5–5.3)
POTASSIUM SERPL-SCNC: 4.4 MMOL/L — SIGNIFICANT CHANGE UP (ref 3.5–5.3)
PROT SERPL-MCNC: 7.6 G/DL — SIGNIFICANT CHANGE UP (ref 6–8.3)
RBC # BLD: 4.97 M/UL — SIGNIFICANT CHANGE UP (ref 4.2–5.8)
RBC # FLD: 13.5 % — SIGNIFICANT CHANGE UP (ref 10.3–14.5)
SAO2 % BLDV: 20.7 % — SIGNIFICANT CHANGE UP
SARS-COV-2 RNA SPEC QL NAA+PROBE: SIGNIFICANT CHANGE UP
SODIUM SERPL-SCNC: 136 MMOL/L — SIGNIFICANT CHANGE UP (ref 135–145)
TROPONIN T, HIGH SENSITIVITY RESULT: 15 NG/L — SIGNIFICANT CHANGE UP
TROPONIN T, HIGH SENSITIVITY RESULT: 16 NG/L — SIGNIFICANT CHANGE UP
WBC # BLD: 10.14 K/UL — SIGNIFICANT CHANGE UP (ref 3.8–10.5)
WBC # FLD AUTO: 10.14 K/UL — SIGNIFICANT CHANGE UP (ref 3.8–10.5)

## 2022-03-14 PROCEDURE — 74177 CT ABD & PELVIS W/CONTRAST: CPT | Mod: 26,MA

## 2022-03-14 PROCEDURE — 71260 CT THORAX DX C+: CPT | Mod: 26,MA

## 2022-03-14 PROCEDURE — 71045 X-RAY EXAM CHEST 1 VIEW: CPT | Mod: 26

## 2022-03-14 PROCEDURE — 93010 ELECTROCARDIOGRAM REPORT: CPT | Mod: NC

## 2022-03-14 PROCEDURE — 99285 EMERGENCY DEPT VISIT HI MDM: CPT | Mod: 25

## 2022-03-14 RX ORDER — SODIUM CHLORIDE 9 MG/ML
1000 INJECTION INTRAMUSCULAR; INTRAVENOUS; SUBCUTANEOUS ONCE
Refills: 0 | Status: COMPLETED | OUTPATIENT
Start: 2022-03-14 | End: 2022-03-14

## 2022-03-14 RX ORDER — ONDANSETRON 8 MG/1
4 TABLET, FILM COATED ORAL EVERY 4 HOURS
Refills: 0 | Status: DISCONTINUED | OUTPATIENT
Start: 2022-03-14 | End: 2022-03-20

## 2022-03-14 RX ORDER — METOCLOPRAMIDE HCL 10 MG
10 TABLET ORAL EVERY 4 HOURS
Refills: 0 | Status: DISCONTINUED | OUTPATIENT
Start: 2022-03-14 | End: 2022-03-20

## 2022-03-14 RX ORDER — MORPHINE SULFATE 50 MG/1
4 CAPSULE, EXTENDED RELEASE ORAL ONCE
Refills: 0 | Status: DISCONTINUED | OUTPATIENT
Start: 2022-03-14 | End: 2022-03-14

## 2022-03-14 RX ORDER — ACETAMINOPHEN 500 MG
1000 TABLET ORAL ONCE
Refills: 0 | Status: COMPLETED | OUTPATIENT
Start: 2022-03-14 | End: 2022-03-15

## 2022-03-14 RX ORDER — ZOLPIDEM TARTRATE 10 MG/1
5 TABLET ORAL AT BEDTIME
Refills: 0 | Status: DISCONTINUED | OUTPATIENT
Start: 2022-03-14 | End: 2022-03-20

## 2022-03-14 RX ORDER — BUDESONIDE AND FORMOTEROL FUMARATE DIHYDRATE 160; 4.5 UG/1; UG/1
2 AEROSOL RESPIRATORY (INHALATION)
Refills: 0 | Status: DISCONTINUED | OUTPATIENT
Start: 2022-03-14 | End: 2022-03-20

## 2022-03-14 RX ORDER — ONDANSETRON 8 MG/1
4 TABLET, FILM COATED ORAL ONCE
Refills: 0 | Status: COMPLETED | OUTPATIENT
Start: 2022-03-14 | End: 2022-03-14

## 2022-03-14 RX ORDER — PANTOPRAZOLE SODIUM 20 MG/1
40 TABLET, DELAYED RELEASE ORAL
Refills: 0 | Status: DISCONTINUED | OUTPATIENT
Start: 2022-03-14 | End: 2022-03-20

## 2022-03-14 RX ORDER — GABAPENTIN 400 MG/1
300 CAPSULE ORAL AT BEDTIME
Refills: 0 | Status: DISCONTINUED | OUTPATIENT
Start: 2022-03-14 | End: 2022-03-20

## 2022-03-14 RX ORDER — HEPARIN SODIUM 5000 [USP'U]/ML
5000 INJECTION INTRAVENOUS; SUBCUTANEOUS EVERY 8 HOURS
Refills: 0 | Status: DISCONTINUED | OUTPATIENT
Start: 2022-03-14 | End: 2022-03-19

## 2022-03-14 RX ORDER — METOPROLOL TARTRATE 50 MG
50 TABLET ORAL DAILY
Refills: 0 | Status: DISCONTINUED | OUTPATIENT
Start: 2022-03-14 | End: 2022-03-20

## 2022-03-14 RX ORDER — SODIUM CHLORIDE 9 MG/ML
1000 INJECTION INTRAMUSCULAR; INTRAVENOUS; SUBCUTANEOUS
Refills: 0 | Status: DISCONTINUED | OUTPATIENT
Start: 2022-03-14 | End: 2022-03-16

## 2022-03-14 RX ORDER — METOCLOPRAMIDE HCL 10 MG
10 TABLET ORAL ONCE
Refills: 0 | Status: COMPLETED | OUTPATIENT
Start: 2022-03-14 | End: 2022-03-14

## 2022-03-14 RX ORDER — TIOTROPIUM BROMIDE 18 UG/1
1 CAPSULE ORAL; RESPIRATORY (INHALATION) DAILY
Refills: 0 | Status: DISCONTINUED | OUTPATIENT
Start: 2022-03-14 | End: 2022-03-20

## 2022-03-14 RX ADMIN — BUDESONIDE AND FORMOTEROL FUMARATE DIHYDRATE 2 PUFF(S): 160; 4.5 AEROSOL RESPIRATORY (INHALATION) at 23:16

## 2022-03-14 RX ADMIN — HEPARIN SODIUM 5000 UNIT(S): 5000 INJECTION INTRAVENOUS; SUBCUTANEOUS at 23:16

## 2022-03-14 RX ADMIN — SODIUM CHLORIDE 75 MILLILITER(S): 9 INJECTION INTRAMUSCULAR; INTRAVENOUS; SUBCUTANEOUS at 17:48

## 2022-03-14 RX ADMIN — SODIUM CHLORIDE 75 MILLILITER(S): 9 INJECTION INTRAMUSCULAR; INTRAVENOUS; SUBCUTANEOUS at 23:15

## 2022-03-14 RX ADMIN — MORPHINE SULFATE 4 MILLIGRAM(S): 50 CAPSULE, EXTENDED RELEASE ORAL at 12:28

## 2022-03-14 RX ADMIN — MORPHINE SULFATE 4 MILLIGRAM(S): 50 CAPSULE, EXTENDED RELEASE ORAL at 10:57

## 2022-03-14 RX ADMIN — SODIUM CHLORIDE 1000 MILLILITER(S): 9 INJECTION INTRAMUSCULAR; INTRAVENOUS; SUBCUTANEOUS at 11:04

## 2022-03-14 RX ADMIN — Medication 10 MILLIGRAM(S): at 16:13

## 2022-03-14 RX ADMIN — GABAPENTIN 300 MILLIGRAM(S): 400 CAPSULE ORAL at 23:16

## 2022-03-14 RX ADMIN — ONDANSETRON 4 MILLIGRAM(S): 8 TABLET, FILM COATED ORAL at 10:57

## 2022-03-14 NOTE — ED PROVIDER NOTE - NSICDXPASTSURGICALHX_GEN_ALL_CORE_FT
PAST SURGICAL HISTORY:  H/O hiatal hernia repaired    H/O submucous nasal surgery septoplasty    Presence of gastric pacemaker 2018    S/P Achilles tendon repair right - done 2x    S/P carpal tunnel release right    S/P laparoscopic fundoplication 2x    S/P tonsillectomy and adenoidectomy

## 2022-03-14 NOTE — ED PROVIDER NOTE - NSICDXPASTMEDICALHX_GEN_ALL_CORE_FT
PAST MEDICAL HISTORY:  Anxiety PTSD - [employer] responnder    Asthma     COVID-19 vaccine series completed     COVID-19 virus infection h/o 4/2020    Depression     Diabetes mellitus     Gastroparesis     GERD (gastroesophageal reflux disease)     Hiatal hernia     HTN (hypertension)     Insomnia     Intervertebral disc disorder bulging discs cervical and lumbar region    Low testosterone in male     Osteoarthritis     PE (pulmonary thromboembolism)     Reactive airway disease     Shoulder pain, left

## 2022-03-14 NOTE — H&P ADULT - PROBLEM SELECTOR PLAN 1
- admit to 8T   - barium swallow  - clears w IV fluid  - hold eliquis  - continue current medications  - preop for OR on Wednesday for EGD, biopsy

## 2022-03-14 NOTE — ED PROVIDER NOTE - PROGRESS NOTE DETAILS
Garth PGY1  Patient reassessed at bedside, complaining of 8/10 severe pain. Would like more pain medications.   4mg of morphine ordered. Garth PGY1   Discussed the patient with Dr. Javier ([phone]). Patient has an appointment tomorrow for barium study with Dr. Javier. Dr. Javier will send his team down to evaluate the patient in the ER.   Patient reassessed and reports improving pain, however with persistent nausea. Ordered reglan.  Initial trop of 16. Will repeat. Garth PGY1  Admitted to Dr. Javier for further management.

## 2022-03-14 NOTE — ED PROVIDER NOTE - PHYSICAL EXAMINATION
General: appears uncomfortable and in pain  HEENT: atraumatic, normocephalic; pupils are equal, round and react to light, extraocular movements intact bilaterally without deficits, no conjunctival pallor, mucous membranes moist  Neck: no jugular venous distension, full range of motion  Chest/Lung: clear to auscultation bilaterally, no wheezes/rhonchi/rales  Heart: regular rate and rhythm, no murmur/gallops/rubs  Abdomen: normal bowel sounds, soft, epigastric tenderness   Extremities: no lower extremity edema, +2 radial pulses bilaterally, +2 dorsalis pedis pulses bilaterally  Musculoskeletal: full range of motion of all 4 extremities  Nervous System: alert and oriented, no motor deficits or sensory deficits; CNII-XII grossly intact; no focal neurologic deficits  Skin: no rashes/lacerations noted

## 2022-03-14 NOTE — ED ADULT NURSE NOTE - INTERVENTIONS DEFINITIONS
[place] to call system/Call bell, personal items and telephone within reach/Instruct patient to call for assistance/Non-slip footwear when patient is off stretcher/Physically safe environment: no spills, clutter or unnecessary equipment/Stretcher in lowest position, wheels locked, appropriate side rails in place/Monitor gait and stability/Reinforce activity limits and safety measures with patient and family

## 2022-03-14 NOTE — H&P ADULT - NSHPPHYSICALEXAM_GEN_ALL_CORE
PHYSICAL EXAM:    Constitutional: well-developed; well-groomed; well-nourished; no distress,  Eyes: PERRL; EOMI  ENMT: Normal oropharnxy, no oral lesions, no erythema, no exudates  Neck:  Supple; no JVD; normal thyroid gland  MSK/Back: normal shape; ROM intact; strength intact, no CVA tenderness. Normal strength in all ext, No joint swelling, no joint tenderenss, no joint erythema, no effusions  Respiratory: airway patent; breath sounds equal; good air movement, no wheezing, no crackes, no rhonchi. no increase in WOB  Cardiovascular: regular rate and rhythm  no rub , no murmur, no gallops. Chest wall non-tender  Gastrointestinal: soft; no distention, normal BS, tender to palpation in the mid-epigastrics, no rebound, no guarding, no mass, no organomegaly, no ascites.  Extremities: no clubbing; no cyanosis; no pedal edema, non-tender to palpation, DP and Radial pulses intact.  Neurological: alert and oriented x 3; responds to pain; responds to verbal commands; sensation intact: CN nerves grossly intact.   Skin: warm and dry; color normal: no rash: no ulcers  Psychiatric: Calm, no SI/HI

## 2022-03-14 NOTE — H&P ADULT - NSHPREVIEWOFSYSTEMS_GEN_ALL_CORE
pt unable to tolerate PO for last 3 days  neuro: no weakness, gait change, sensory changes, no blurred vision  cardiac: no chest pain, no shortness of breath  pulmonary: no cough, no sputum production  GI: see HPI no diarrhea, no melena  MSK: pt ambulatory, no limb complaints  psych: no depression, no change in mood  ROS STATEMENT: all other ROS negative except as per HPI

## 2022-03-14 NOTE — ED PROVIDER NOTE - OBJECTIVE STATEMENT
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 Reports that he has been having this pain for quite a while now, has similar episode in January and evaluated in ED. Has a barium swallow scheduled tomorrow but unable to tolerate the pain. Has not had any oral intake in 3 days due to pain and retching. Feels like his chronic esophagal pain, but denies any pain in the left side of chest or SOB. Denies fever, chills, diarrhea, bloody stools. 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 Reports that he has been having this pain for quite a while now, has similar episode in January and evaluated in ED. Has a barium swallow scheduled tomorrow but unable to tolerate the pain. Has not had any oral intake in 3 days due to pain and retching. Feels like his chronic esophagal pain, but denies any pain in the left side of chest or SOB. Denies fever, chills, diarrhea, bloody stools.  Cardiothoracic surgeon: Dillon Javier MD  GI: Angel Lindo MD

## 2022-03-14 NOTE — PATIENT PROFILE ADULT - FALL HARM RISK - UNIVERSAL INTERVENTIONS
Bed in lowest position, wheels locked, appropriate side rails in place/Call bell, personal items and telephone in reach/Instruct patient to call for assistance before getting out of bed or chair/Non-slip footwear when patient is out of bed/[place] to call system/Physically safe environment - no spills, clutter or unnecessary equipment/Purposeful Proactive Rounding/Room/bathroom lighting operational, light cord in reach

## 2022-03-14 NOTE — ED PROVIDER NOTE - ATTENDING CONTRIBUTION TO CARE
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  pt states he has this intermittently for a long time, is scheduled for barium swallowtomroroe, but pain is just too much to tolerate. states is does not feel like his PE< not chest pain or sob, it feels like his chronic pain in his esophagus  pt uncomfortable appearing, abdomen tender  in upper areas and epigastrium  will cehck labs, pain contorl, CT dw GI re dispo

## 2022-03-14 NOTE — ED PROVIDER NOTE - CLINICAL SUMMARY MEDICAL DECISION MAKING FREE TEXT BOX
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 Concerning for acute esophageal/abdominal pathologies. Will also r/o ACS. CBC, CMP, lipase, VBG, trop, COVID swab. ECG, upright CXR, CT chest/abd/pel. Fluids and symptomatic management with zofran and morphine.

## 2022-03-14 NOTE — ED ADULT TRIAGE NOTE - CHIEF COMPLAINT QUOTE
pt c/o chest discomfort, "burning" and n/v x 3 days. reports generalized weakness. denies fever/chills. states is scheduled for barium swallow tomorrow.

## 2022-03-14 NOTE — H&P ADULT - ASSESSMENT
56 y/o male w/ PMH of PE (7/2018, on Eliquis), Asthma, HTN, gastroparesis due to H/o type II DM  & vagal nerve disorder h/o placement of gastric pacemaker  on 10/22/18, recurrent hiatal hernia repair, revision of Toupet fundoplication on 4/12/21 presents with 3-day history of worsening severe midsternal and epigastric abdominal pain, nausea and retching/vomiting.

## 2022-03-14 NOTE — H&P ADULT - HISTORY OF PRESENT ILLNESS
56 y/o male w/ PMH of PE (7/2018, on Eliquis), Asthma, HTN, gastroparesis due to H/o type II DM  & vagal nerve disorder h/o placement of gastric pacemaker  on 10/22/18, recurrent hiatal hernia repair, revision of Toupet fundoplication on 4/12/21 presents with 3-day history of worsening severe midsternal and epigastric abdominal pain, nausea and retching/vomiting. Reports that he has been having this pain for quite a while now, has similar episode in January and evaluated in ED. Has a barium swallow scheduled tomorrow but unable to tolerate the pain. Has not had any oral intake in 3 days due to pain and retching. Feels like his chronic esophagal pain, but denies any pain in the left side of chest or SOB. Denies fever, chills, diarrhea, bloody stools.

## 2022-03-14 NOTE — ED ADULT NURSE NOTE - OBJECTIVE STATEMENT
pt received in rm 16 AAO x 3. pt with hx of PE on eliquis. pt c/o midsternal chest pain, epigastric, n/v x 3 days. pt denies sob, diarrhea, fevers, chills. respirations even and unlabored. 20g iv placed to right ac. pt placed on CM. will continue to monitor.

## 2022-03-15 ENCOUNTER — TRANSCRIPTION ENCOUNTER (OUTPATIENT)
Age: 57
End: 2022-03-15

## 2022-03-15 ENCOUNTER — APPOINTMENT (OUTPATIENT)
Dept: RADIOLOGY | Facility: HOSPITAL | Age: 57
End: 2022-03-15

## 2022-03-15 LAB
ANION GAP SERPL CALC-SCNC: 10 MMOL/L — SIGNIFICANT CHANGE UP (ref 7–14)
BLD GP AB SCN SERPL QL: NEGATIVE — SIGNIFICANT CHANGE UP
BUN SERPL-MCNC: 8 MG/DL — SIGNIFICANT CHANGE UP (ref 7–23)
CALCIUM SERPL-MCNC: 8.7 MG/DL — SIGNIFICANT CHANGE UP (ref 8.4–10.5)
CHLORIDE SERPL-SCNC: 105 MMOL/L — SIGNIFICANT CHANGE UP (ref 98–107)
CO2 SERPL-SCNC: 24 MMOL/L — SIGNIFICANT CHANGE UP (ref 22–31)
CREAT SERPL-MCNC: 1.11 MG/DL — SIGNIFICANT CHANGE UP (ref 0.5–1.3)
EGFR: 78 ML/MIN/1.73M2 — SIGNIFICANT CHANGE UP
GLUCOSE SERPL-MCNC: 104 MG/DL — HIGH (ref 70–99)
HCT VFR BLD CALC: 37.7 % — LOW (ref 39–50)
HGB BLD-MCNC: 13.5 G/DL — SIGNIFICANT CHANGE UP (ref 13–17)
MAGNESIUM SERPL-MCNC: 1.7 MG/DL — SIGNIFICANT CHANGE UP (ref 1.6–2.6)
MCHC RBC-ENTMCNC: 31.8 PG — SIGNIFICANT CHANGE UP (ref 27–34)
MCHC RBC-ENTMCNC: 35.8 GM/DL — SIGNIFICANT CHANGE UP (ref 32–36)
MCV RBC AUTO: 88.7 FL — SIGNIFICANT CHANGE UP (ref 80–100)
NRBC # BLD: 0 /100 WBCS — SIGNIFICANT CHANGE UP
NRBC # FLD: 0 K/UL — SIGNIFICANT CHANGE UP
PHOSPHATE SERPL-MCNC: 2.9 MG/DL — SIGNIFICANT CHANGE UP (ref 2.5–4.5)
PLATELET # BLD AUTO: 155 K/UL — SIGNIFICANT CHANGE UP (ref 150–400)
POTASSIUM SERPL-MCNC: 3.7 MMOL/L — SIGNIFICANT CHANGE UP (ref 3.5–5.3)
POTASSIUM SERPL-SCNC: 3.7 MMOL/L — SIGNIFICANT CHANGE UP (ref 3.5–5.3)
RBC # BLD: 4.25 M/UL — SIGNIFICANT CHANGE UP (ref 4.2–5.8)
RBC # FLD: 13.6 % — SIGNIFICANT CHANGE UP (ref 10.3–14.5)
RH IG SCN BLD-IMP: POSITIVE — SIGNIFICANT CHANGE UP
SODIUM SERPL-SCNC: 139 MMOL/L — SIGNIFICANT CHANGE UP (ref 135–145)
WBC # BLD: 6.37 K/UL — SIGNIFICANT CHANGE UP (ref 3.8–10.5)
WBC # FLD AUTO: 6.37 K/UL — SIGNIFICANT CHANGE UP (ref 3.8–10.5)

## 2022-03-15 PROCEDURE — 99232 SBSQ HOSP IP/OBS MODERATE 35: CPT | Mod: 57

## 2022-03-15 PROCEDURE — 74220 X-RAY XM ESOPHAGUS 1CNTRST: CPT | Mod: 26

## 2022-03-15 PROCEDURE — 99233 SBSQ HOSP IP/OBS HIGH 50: CPT

## 2022-03-15 RX ORDER — INFLUENZA VIRUS VACCINE 15; 15; 15; 15 UG/.5ML; UG/.5ML; UG/.5ML; UG/.5ML
0.5 SUSPENSION INTRAMUSCULAR ONCE
Refills: 0 | Status: DISCONTINUED | OUTPATIENT
Start: 2022-03-15 | End: 2022-03-17

## 2022-03-15 RX ADMIN — TIOTROPIUM BROMIDE 1 CAPSULE(S): 18 CAPSULE ORAL; RESPIRATORY (INHALATION) at 12:01

## 2022-03-15 RX ADMIN — BUDESONIDE AND FORMOTEROL FUMARATE DIHYDRATE 2 PUFF(S): 160; 4.5 AEROSOL RESPIRATORY (INHALATION) at 23:22

## 2022-03-15 RX ADMIN — Medication 400 MILLIGRAM(S): at 22:31

## 2022-03-15 RX ADMIN — Medication 1000 MILLIGRAM(S): at 23:20

## 2022-03-15 RX ADMIN — PANTOPRAZOLE SODIUM 40 MILLIGRAM(S): 20 TABLET, DELAYED RELEASE ORAL at 06:01

## 2022-03-15 RX ADMIN — HEPARIN SODIUM 5000 UNIT(S): 5000 INJECTION INTRAVENOUS; SUBCUTANEOUS at 06:01

## 2022-03-15 RX ADMIN — Medication 50 MILLIGRAM(S): at 06:01

## 2022-03-15 RX ADMIN — BUDESONIDE AND FORMOTEROL FUMARATE DIHYDRATE 2 PUFF(S): 160; 4.5 AEROSOL RESPIRATORY (INHALATION) at 12:01

## 2022-03-15 RX ADMIN — GABAPENTIN 300 MILLIGRAM(S): 400 CAPSULE ORAL at 22:31

## 2022-03-15 RX ADMIN — HEPARIN SODIUM 5000 UNIT(S): 5000 INJECTION INTRAVENOUS; SUBCUTANEOUS at 22:31

## 2022-03-15 RX ADMIN — HEPARIN SODIUM 5000 UNIT(S): 5000 INJECTION INTRAVENOUS; SUBCUTANEOUS at 13:44

## 2022-03-15 NOTE — CHART NOTE - NSCHARTNOTEFT_GEN_A_CORE
Pt cont to complain of dysphagia to clears. Scheduled for EGD, Biopsy tomorrow in OR w/ Dr. Javier.    ICU Vital Signs Last 24 Hrs  T(C): 37.3 (15 Mar 2022 16:31), Max: 37.4 (15 Mar 2022 12:23)  T(F): 99.2 (15 Mar 2022 16:31), Max: 99.4 (15 Mar 2022 12:23)  HR: 66 (15 Mar 2022 16:31) (62 - 82)  BP: 139/80 (15 Mar 2022 16:31) (114/58 - 139/80)  BP(mean): --  ABP: --  ABP(mean): --  RR: 18 (15 Mar 2022 16:31) (16 - 18)  SpO2: 100% (15 Mar 2022 16:31) (100% - 100%)                            13.5   6.37  )-----------( 155      ( 15 Mar 2022 08:37 )             37.7     03-15    139  |  105  |  8   ----------------------------<  104<H>  3.7   |  24  |  1.11    Ca    8.7      15 Mar 2022 08:37  Phos  2.9     03-15  Mg     1.70     03-15    TPro  7.6  /  Alb  4.5  /  TBili  1.7<H>  /  DBili  x   /  AST  48<H>  /  ALT  70<H>  /  AlkPhos  71  03-14      MEDICATIONS  (STANDING):  budesonide 160 MICROgram(s)/formoterol 4.5 MICROgram(s) Inhaler 2 Puff(s) Inhalation two times a day  gabapentin 300 milliGRAM(s) Oral at bedtime  heparin   Injectable 5000 Unit(s) SubCutaneous every 8 hours  influenza   Vaccine 0.5 milliLiter(s) IntraMuscular once  metoprolol succinate ER 50 milliGRAM(s) Oral daily  pantoprazole    Tablet 40 milliGRAM(s) Oral before breakfast  sodium chloride 0.9%. 1000 milliLiter(s) (75 mL/Hr) IV Continuous <Continuous>  tiotropium 18 MICROgram(s) Capsule 1 Capsule(s) Inhalation daily    MEDICATIONS  (PRN):  acetaminophen   IVPB .. 1000 milliGRAM(s) IV Intermittent once PRN Mild Pain (1 - 3)  metoclopramide 10 milliGRAM(s) Oral every 4 hours PRN nausea  ondansetron Injectable 4 milliGRAM(s) IV Push every 4 hours PRN Nausea and/or Vomiting  zolpidem 5 milliGRAM(s) Oral at bedtime PRN Insomnia  zolpidem 5 milliGRAM(s) Oral at bedtime PRN Insomnia      NPO past midnight, IVF NS at 75cc/hr    A/P  HPI:  54 y/o male w/ PMH of PE (7/2018, on Eliquis), Asthma, HTN, gastroparesis due to H/o type II DM  & vagal nerve disorder h/o placement of gastric pacemaker  on 10/22/18, recurrent hiatal hernia repair, revision of Toupet fundoplication on 4/12/21 presents with 3-day history of worsening severe midsternal and epigastric abdominal pain, nausea and retching/vomiting. Reports that he has been having this pain for quite a while now, has similar episode in January and evaluated in ED. Has a barium swallow scheduled tomorrow but unable to tolerate the pain. Has not had any oral intake in 3 days due to pain and retching. Feels like his chronic esophagal pain, but denies any pain in the left side of chest or SOB. Denies fever, chills, diarrhea, bloody stools.  	 (14 Mar 2022 16:09)  s/p barium swallow today- no hiatal hernia, contrast passes freely.    EGD/BX tomorrow

## 2022-03-15 NOTE — PROGRESS NOTE ADULT - ASSESSMENT
Assessment :56 y/o male w/ PMH of PE (7/2018, on Eliquis), Asthma, HTN, gastroparesis due to H/o type II DM  & vagal nerve disorder h/o placement of gastric pacemaker  on 10/22/18, recurrent hiatal hernia repair, revision of Toupet fundoplication on 4/12/21 presents with 3-day history of worsening severe midsternal and epigastric abdominal pain, nausea and retching/vomiting.      PLAN  - Barium Swallow  - Preop for EGD tomorrow  - Encourage coughing, deep breathing  - Diet: CLD  - DVT PPx: Hep Subq    Thoracic Surgery  [id number]

## 2022-03-15 NOTE — PROGRESS NOTE ADULT - ATTENDING COMMENTS
patient with swallow demonstrating passage of contrast without delay. unclear etiology of patient's symptoms as there is no significant findings on barium swallow. plan for EGD with Dr. Javier tomorrow.

## 2022-03-16 ENCOUNTER — APPOINTMENT (OUTPATIENT)
Dept: THORACIC SURGERY | Facility: HOSPITAL | Age: 57
End: 2022-03-16

## 2022-03-16 ENCOUNTER — TRANSCRIPTION ENCOUNTER (OUTPATIENT)
Age: 57
End: 2022-03-16

## 2022-03-16 ENCOUNTER — RESULT REVIEW (OUTPATIENT)
Age: 57
End: 2022-03-16

## 2022-03-16 LAB
ANION GAP SERPL CALC-SCNC: 10 MMOL/L — SIGNIFICANT CHANGE UP (ref 7–14)
APTT BLD: 34.3 SEC — SIGNIFICANT CHANGE UP (ref 27–36.3)
BLD GP AB SCN SERPL QL: NEGATIVE — SIGNIFICANT CHANGE UP
BUN SERPL-MCNC: 4 MG/DL — LOW (ref 7–23)
CALCIUM SERPL-MCNC: 9.2 MG/DL — SIGNIFICANT CHANGE UP (ref 8.4–10.5)
CHLORIDE SERPL-SCNC: 107 MMOL/L — SIGNIFICANT CHANGE UP (ref 98–107)
CO2 SERPL-SCNC: 25 MMOL/L — SIGNIFICANT CHANGE UP (ref 22–31)
CREAT SERPL-MCNC: 1.03 MG/DL — SIGNIFICANT CHANGE UP (ref 0.5–1.3)
EGFR: 85 ML/MIN/1.73M2 — SIGNIFICANT CHANGE UP
GLUCOSE SERPL-MCNC: 104 MG/DL — HIGH (ref 70–99)
HCT VFR BLD CALC: 39.2 % — SIGNIFICANT CHANGE UP (ref 39–50)
HGB BLD-MCNC: 13.2 G/DL — SIGNIFICANT CHANGE UP (ref 13–17)
INR BLD: 1.18 RATIO — HIGH (ref 0.88–1.16)
MAGNESIUM SERPL-MCNC: 1.8 MG/DL — SIGNIFICANT CHANGE UP (ref 1.6–2.6)
MCHC RBC-ENTMCNC: 30.1 PG — SIGNIFICANT CHANGE UP (ref 27–34)
MCHC RBC-ENTMCNC: 33.7 GM/DL — SIGNIFICANT CHANGE UP (ref 32–36)
MCV RBC AUTO: 89.5 FL — SIGNIFICANT CHANGE UP (ref 80–100)
NRBC # BLD: 0 /100 WBCS — SIGNIFICANT CHANGE UP
NRBC # FLD: 0 K/UL — SIGNIFICANT CHANGE UP
PHOSPHATE SERPL-MCNC: 4.2 MG/DL — SIGNIFICANT CHANGE UP (ref 2.5–4.5)
PLATELET # BLD AUTO: 192 K/UL — SIGNIFICANT CHANGE UP (ref 150–400)
POTASSIUM SERPL-MCNC: 3.8 MMOL/L — SIGNIFICANT CHANGE UP (ref 3.5–5.3)
POTASSIUM SERPL-SCNC: 3.8 MMOL/L — SIGNIFICANT CHANGE UP (ref 3.5–5.3)
PROTHROM AB SERPL-ACNC: 13.7 SEC — HIGH (ref 10.5–13.4)
RBC # BLD: 4.38 M/UL — SIGNIFICANT CHANGE UP (ref 4.2–5.8)
RBC # FLD: 13.4 % — SIGNIFICANT CHANGE UP (ref 10.3–14.5)
RH IG SCN BLD-IMP: POSITIVE — SIGNIFICANT CHANGE UP
SODIUM SERPL-SCNC: 142 MMOL/L — SIGNIFICANT CHANGE UP (ref 135–145)
WBC # BLD: 5.71 K/UL — SIGNIFICANT CHANGE UP (ref 3.8–10.5)
WBC # FLD AUTO: 5.71 K/UL — SIGNIFICANT CHANGE UP (ref 3.8–10.5)

## 2022-03-16 PROCEDURE — 88312 SPECIAL STAINS GROUP 1: CPT | Mod: 26

## 2022-03-16 PROCEDURE — 88342 IMHCHEM/IMCYTCHM 1ST ANTB: CPT | Mod: 26

## 2022-03-16 PROCEDURE — 43239 EGD BIOPSY SINGLE/MULTIPLE: CPT

## 2022-03-16 PROCEDURE — 71045 X-RAY EXAM CHEST 1 VIEW: CPT | Mod: 26

## 2022-03-16 PROCEDURE — 88305 TISSUE EXAM BY PATHOLOGIST: CPT | Mod: 26

## 2022-03-16 RX ORDER — POTASSIUM CHLORIDE 20 MEQ
20 PACKET (EA) ORAL ONCE
Refills: 0 | Status: COMPLETED | OUTPATIENT
Start: 2022-03-16 | End: 2022-03-16

## 2022-03-16 RX ORDER — FENTANYL CITRATE 50 UG/ML
25 INJECTION INTRAVENOUS
Refills: 0 | Status: DISCONTINUED | OUTPATIENT
Start: 2022-03-16 | End: 2022-03-16

## 2022-03-16 RX ORDER — ACETAMINOPHEN 500 MG
1000 TABLET ORAL ONCE
Refills: 0 | Status: COMPLETED | OUTPATIENT
Start: 2022-03-16 | End: 2022-03-16

## 2022-03-16 RX ORDER — FENTANYL CITRATE 50 UG/ML
50 INJECTION INTRAVENOUS
Refills: 0 | Status: DISCONTINUED | OUTPATIENT
Start: 2022-03-16 | End: 2022-03-16

## 2022-03-16 RX ORDER — ONDANSETRON 8 MG/1
4 TABLET, FILM COATED ORAL ONCE
Refills: 0 | Status: DISCONTINUED | OUTPATIENT
Start: 2022-03-16 | End: 2022-03-16

## 2022-03-16 RX ORDER — MAGNESIUM SULFATE 500 MG/ML
2 VIAL (ML) INJECTION ONCE
Refills: 0 | Status: COMPLETED | OUTPATIENT
Start: 2022-03-16 | End: 2022-03-16

## 2022-03-16 RX ADMIN — TIOTROPIUM BROMIDE 1 CAPSULE(S): 18 CAPSULE ORAL; RESPIRATORY (INHALATION) at 09:11

## 2022-03-16 RX ADMIN — SODIUM CHLORIDE 75 MILLILITER(S): 9 INJECTION INTRAMUSCULAR; INTRAVENOUS; SUBCUTANEOUS at 09:10

## 2022-03-16 RX ADMIN — Medication 1000 MILLIGRAM(S): at 22:24

## 2022-03-16 RX ADMIN — Medication 20 MILLIEQUIVALENT(S): at 09:11

## 2022-03-16 RX ADMIN — HEPARIN SODIUM 5000 UNIT(S): 5000 INJECTION INTRAVENOUS; SUBCUTANEOUS at 06:13

## 2022-03-16 RX ADMIN — BUDESONIDE AND FORMOTEROL FUMARATE DIHYDRATE 2 PUFF(S): 160; 4.5 AEROSOL RESPIRATORY (INHALATION) at 22:01

## 2022-03-16 RX ADMIN — PANTOPRAZOLE SODIUM 40 MILLIGRAM(S): 20 TABLET, DELAYED RELEASE ORAL at 06:13

## 2022-03-16 RX ADMIN — GABAPENTIN 300 MILLIGRAM(S): 400 CAPSULE ORAL at 22:02

## 2022-03-16 RX ADMIN — Medication 25 GRAM(S): at 09:11

## 2022-03-16 RX ADMIN — HEPARIN SODIUM 5000 UNIT(S): 5000 INJECTION INTRAVENOUS; SUBCUTANEOUS at 14:46

## 2022-03-16 RX ADMIN — HEPARIN SODIUM 5000 UNIT(S): 5000 INJECTION INTRAVENOUS; SUBCUTANEOUS at 22:03

## 2022-03-16 RX ADMIN — Medication 50 MILLIGRAM(S): at 06:17

## 2022-03-16 RX ADMIN — Medication 400 MILLIGRAM(S): at 22:01

## 2022-03-16 RX ADMIN — BUDESONIDE AND FORMOTEROL FUMARATE DIHYDRATE 2 PUFF(S): 160; 4.5 AEROSOL RESPIRATORY (INHALATION) at 09:11

## 2022-03-16 NOTE — DISCHARGE NOTE PROVIDER - PROVIDER TOKENS
PROVIDER:[TOKEN:[[id number]]],PROVIDER:[TOKEN:[[id number]]] PROVIDER:[TOKEN:[[id number]]],PROVIDER:[TOKEN:[[id number]]],PROVIDER:[TOKEN:[[id number]]]

## 2022-03-16 NOTE — BRIEF OPERATIVE NOTE - ELECTIVE PROCEDURE
DISPLAY PLAN FREE TEXT DISPLAY PLAN FREE TEXT DISPLAY PLAN FREE TEXT DISPLAY PLAN FREE TEXT DISPLAY PLAN FREE TEXT Yes DISPLAY PLAN FREE TEXT DISPLAY PLAN FREE TEXT DISPLAY PLAN FREE TEXT

## 2022-03-16 NOTE — DISCHARGE NOTE PROVIDER - INSTRUCTIONS
Please continue with a clear liquid diet for today and advance your diet at tolerated to regular diet starting tomorrow. Continue to avoid carbonated beverages and extremem temperature food/drinks. Avoid large meals in one sitting, instead spread them out into smaller meals.  Regular diet but watch sodium and sugar.   Continue to avoid carbonated beverages and extreme temperature food/drinks. Avoid large meals in one sitting, instead spread them out into smaller meals.  Regular diet as tolerated. Continue all liquids. Drink Ensure supplements 3x per day  Continue to avoid carbonated beverages and extreme temperature food/drinks. Avoid large meals in one sitting, instead spread them out into smaller meals.

## 2022-03-16 NOTE — BRIEF OPERATIVE NOTE - OPERATION/FINDINGS
EGD with biopsy at 42 cm  Nissen wrap intact w/o evidence of hernia recurrence. Normal mucosa. Normal pylorus

## 2022-03-16 NOTE — DISCHARGE NOTE PROVIDER - NSDCFUADDAPPT_GEN_ALL_CORE_FT
Follow up with Dr Dillon Javier in 7-10 business days.   Call the office for your follow up appointment. Follow up with Dr Dillon Javier in 1-2 weeks. Call for an apt.   See GI Dr. Lidno in 1-2 weeks. Call for an apt.

## 2022-03-16 NOTE — DISCHARGE NOTE PROVIDER - HOSPITAL COURSE
54 y/o male w/ PMH of PE (7/2018, on Eliquis), Asthma, HTN, gastroparesis due to H/o type II DM  & vagal nerve disorder h/o placement of gastric pacemaker  on 10/22/18, recurrent hiatal hernia repair, revision of Toupet fundoplication on 4/12/21 presents with 3-day history of worsening severe midsternal and epigastric abdominal pain, nausea and retching/vomiting. Pt admitted to thoracic surgery now s/p BS followed by EGD with biopsy without significant findings. He is clear for discharge by Dr Dillon Javier with follow up with GI doctor. 56 y/o male w/ PMH of PE (7/2018, on Eliquis), Asthma, HTN, gastroparesis due to H/o type II DM  & vagal nerve disorder h/o placement of gastric pacemaker  on 10/22/18, recurrent hiatal hernia repair, revision of Toupet fundoplication on 4/12/21 presents with 3-day history of worsening severe midsternal and epigastric abdominal pain, nausea and retching/vomiting. Pt admitted to thoracic surgery now s/p BS followed by EGD with biopsy without significant findings. He then went for manometry by GI and GO was able to dilate the esophagus.  He was advanced to a regular diet and was cleared for discharge . 54 y/o male w/ PMH of PE (7/2018, on Eliquis), Asthma, HTN, gastroparesis due to H/o type II DM  & vagal nerve disorder h/o placement of gastric pacemaker  on 10/22/18, recurrent hiatal hernia repair, revision of Toupet fundoplication on 4/12/21 presents with 3-day history of worsening severe midsternal and epigastric abdominal pain, nausea and retching/vomiting. Pt admitted to thoracic surgery now s/p BS followed by EGD with biopsy without significant findings. He then went for manometry by GI and GI was able to dilate the esophagus.  New medications added to help motility and lessen Esophageal spasm. He was advanced to a regular diet. Pt still with some esoph  pain and spasm but tolerating all liquids w Ensure without difficulty and small amounts of a regular diet. Pt denies n/v. No abd pain. + BM 2 days ago  Biopsy results pending. Cleared for dc to home by Dr Monique with GI and Dr. Javier follow up to discuss further procedures.   Vital signs stable.  .

## 2022-03-16 NOTE — DISCHARGE NOTE PROVIDER - NSDCFUADDINST_GEN_ALL_CORE_FT
- Leave dressing intact for 48 hrs by reinforcing with tape, if necessary. At that time you may remove the dressing and take a shower.   - Clean all incisions daily while showering with warm water and mild soap, pat dry with a clean towel. Place clean gauze over wound if continual drainage.   - No bathing, swimming in a pool or the ocean until instructed by MD. DO NOT use creams or lotions on the wound (The suture will be removed in the office).    - Continue with daily ambulation and use of incentive spirometer.   - Do not lift heavy objects, operate machinery, drive or return to school/work until you are cleared by your surgeon at your follow up appointment.  - Call the office if you experience any fevers, shortness of breath, chest pain, palpitations, excessive or purulent drainage from the incision site, persistent nausea, vomiting, leg swelling day or night. Call 911 or go to the nearest emergency room if any of these symptoms are severe.   - Take your medications as ordered and take a stool softener if needed with narcotic pain medications.   - Clean all incisions daily while showering with warm water and mild soap, pat dry with a clean towel. Place clean gauze over wound if continual drainage.   - No bathing, swimming in a pool or the ocean until instructed by MD. DO NOT use creams or lotions on the wound (The suture will be removed in the office).    - Continue with daily ambulation and use of incentive spirometer.   - Do not lift heavy objects, operate machinery, drive or return to school/work until you are cleared by your surgeon at your follow up appointment.  - Call the office if you experience any fevers, shortness of breath, chest pain, palpitations, excessive or purulent drainage from the incision site, persistent nausea, vomiting, leg swelling day or night. Call 911 or go to the nearest emergency room if any of these symptoms are severe.   - Take your medications as ordered and take a stool softener if needed with narcotic pain medications.

## 2022-03-16 NOTE — DISCHARGE NOTE PROVIDER - NSDCMRMEDTOKEN_GEN_ALL_CORE_FT
Advair Diskus 500 mcg-50 mcg inhalation powder: 1 puff(s) inhaled 2 times a day  Eliquis 2.5 mg oral tablet: 1 tab(s) orally 2 times a day  fluticasone propionate 55 mcg/inh inhalation powder: 1 puff(s) inhaled every 12 hours  gabapentin 300 mg oral capsule: orally once a day (at bedtime)  Linzess 145 mcg oral capsule: 1 cap(s) orally once a day AM  Metoprolol Succinate ER 50 mg oral tablet, extended release: 1 tab(s) orally once a day AM  omeprazole 40 mg oral delayed release capsule: 1 cap(s) orally once a day AM  ondansetron 4 mg oral tablet, disintegratin tab(s) orally every 8 hours, As Needed for nausea, vomiting  oxyCODONE 5 mg oral tablet: 1 tab(s) orally every 6 hours, As Needed MDD:4 Tabs  Spiriva 18 mcg inhalation capsule: 1 cap(s) inhaled once a day AM  tadalafil 5 mg oral tablet: 1 tab(s) orally once a day  Trintellix 10 mg oral tablet: 1 tab(s) orally once a day AM  zolpidem: 12.5  orally once a day (at bedtime) -Insomnia MDD:2 tabs   Advair Diskus 500 mcg-50 mcg inhalation powder: 1 puff(s) inhaled 2 times a day  bethanechol 5 mg oral tablet: 1 tab(s) orally 3 times a day MDD:3  Eliquis 2.5 mg oral tablet: 1 tab(s) orally 2 times a day  fluticasone propionate 55 mcg/inh inhalation powder: 1 puff(s) inhaled every 12 hours  gabapentin 300 mg oral capsule: orally once a day (at bedtime)  Linzess 145 mcg oral capsule: 1 cap(s) orally once a day AM  Metoprolol Succinate ER 50 mg oral tablet, extended release: 1 tab(s) orally once a day AM  MiraLax oral powder for reconstitution: orally once a day, As Needed  NIFEdipine 30 mg oral tablet, extended release: 1 tab(s) orally once a day  omeprazole 40 mg oral delayed release capsule: 1 cap(s) orally once a day AM  ondansetron 4 mg oral tablet, disintegratin tab(s) orally every 8 hours, As Needed for nausea, vomiting MDD:3  simethicone 80 mg oral tablet, chewable: 1 tab(s) orally 4 times a day (after meals and at bedtime), As Needed for gas/bloating  Spiriva 18 mcg inhalation capsule: 1 cap(s) inhaled once a day AM  tadalafil 5 mg oral tablet: 1 tab(s) orally once a day  Trintellix 10 mg oral tablet: 1 tab(s) orally once a day AM  Tylenol 325 mg oral tablet: 2 tab(s) orally every 6 hours, As Needed  zolpidem 5 mg oral tablet: 1 tab(s) orally once a day (at bedtime), As needed, Insomnia MDD:1

## 2022-03-16 NOTE — DISCHARGE NOTE PROVIDER - CARE PROVIDER_API CALL
Dillon Javier (MD)  Surgery; Thoracic Surgery  [address] [place] [address]  Phone: [phone]  Fax: [phone]  Follow Up Time:     Angel Lindo  Gastroenterology  [address] [place] [address]  Phone: [phone]  Fax: [phone]  Follow Up Time:    Dillon Javier)  Surgery; Thoracic Surgery  [address] [place] [address], [place] [address]  Phone: [phone]  Fax: [phone]  Follow Up Time:     Angel Lindo  Gastroenterology  [address] [place] [address]  Phone: [phone]  Fax: [phone]  Follow Up Time:     Lissette Fernandez)  Gastroenterology; Internal Medicine  [address] [place] [address], [address]  [place] [address]  Phone: [phone]  Fax: [phone]  Follow Up Time:

## 2022-03-16 NOTE — DISCHARGE NOTE PROVIDER - CARE PROVIDERS DIRECT ADDRESSES
[email or web address]

## 2022-03-16 NOTE — DISCHARGE NOTE PROVIDER - NSDCCPTREATMENT_GEN_ALL_CORE_FT
PRINCIPAL PROCEDURE  Procedure: Esophagogastroduodenoscopy (EGD) under anesthesia  Findings and Treatment:

## 2022-03-17 ENCOUNTER — APPOINTMENT (OUTPATIENT)
Dept: THORACIC SURGERY | Facility: CLINIC | Age: 57
End: 2022-03-17

## 2022-03-17 LAB
ANION GAP SERPL CALC-SCNC: 10 MMOL/L — SIGNIFICANT CHANGE UP (ref 7–14)
BUN SERPL-MCNC: 3 MG/DL — LOW (ref 7–23)
CALCIUM SERPL-MCNC: 9.2 MG/DL — SIGNIFICANT CHANGE UP (ref 8.4–10.5)
CHLORIDE SERPL-SCNC: 107 MMOL/L — SIGNIFICANT CHANGE UP (ref 98–107)
CO2 SERPL-SCNC: 24 MMOL/L — SIGNIFICANT CHANGE UP (ref 22–31)
CREAT SERPL-MCNC: 1.05 MG/DL — SIGNIFICANT CHANGE UP (ref 0.5–1.3)
EGFR: 83 ML/MIN/1.73M2 — SIGNIFICANT CHANGE UP
GLUCOSE SERPL-MCNC: 104 MG/DL — HIGH (ref 70–99)
HCT VFR BLD CALC: 39.1 % — SIGNIFICANT CHANGE UP (ref 39–50)
HGB BLD-MCNC: 13.1 G/DL — SIGNIFICANT CHANGE UP (ref 13–17)
MAGNESIUM SERPL-MCNC: 2 MG/DL — SIGNIFICANT CHANGE UP (ref 1.6–2.6)
MCHC RBC-ENTMCNC: 30 PG — SIGNIFICANT CHANGE UP (ref 27–34)
MCHC RBC-ENTMCNC: 33.5 GM/DL — SIGNIFICANT CHANGE UP (ref 32–36)
MCV RBC AUTO: 89.7 FL — SIGNIFICANT CHANGE UP (ref 80–100)
NRBC # BLD: 0 /100 WBCS — SIGNIFICANT CHANGE UP
NRBC # FLD: 0 K/UL — SIGNIFICANT CHANGE UP
PHOSPHATE SERPL-MCNC: 4 MG/DL — SIGNIFICANT CHANGE UP (ref 2.5–4.5)
PLATELET # BLD AUTO: 198 K/UL — SIGNIFICANT CHANGE UP (ref 150–400)
POTASSIUM SERPL-MCNC: 4.3 MMOL/L — SIGNIFICANT CHANGE UP (ref 3.5–5.3)
POTASSIUM SERPL-SCNC: 4.3 MMOL/L — SIGNIFICANT CHANGE UP (ref 3.5–5.3)
RBC # BLD: 4.36 M/UL — SIGNIFICANT CHANGE UP (ref 4.2–5.8)
RBC # FLD: 13.2 % — SIGNIFICANT CHANGE UP (ref 10.3–14.5)
SARS-COV-2 RNA SPEC QL NAA+PROBE: SIGNIFICANT CHANGE UP
SODIUM SERPL-SCNC: 141 MMOL/L — SIGNIFICANT CHANGE UP (ref 135–145)
WBC # BLD: 6.06 K/UL — SIGNIFICANT CHANGE UP (ref 3.8–10.5)
WBC # FLD AUTO: 6.06 K/UL — SIGNIFICANT CHANGE UP (ref 3.8–10.5)

## 2022-03-17 PROCEDURE — 99231 SBSQ HOSP IP/OBS SF/LOW 25: CPT

## 2022-03-17 PROCEDURE — 99222 1ST HOSP IP/OBS MODERATE 55: CPT | Mod: GC

## 2022-03-17 PROCEDURE — 71045 X-RAY EXAM CHEST 1 VIEW: CPT | Mod: 26

## 2022-03-17 RX ORDER — SODIUM CHLORIDE 9 MG/ML
1000 INJECTION, SOLUTION INTRAVENOUS
Refills: 0 | Status: DISCONTINUED | OUTPATIENT
Start: 2022-03-17 | End: 2022-03-19

## 2022-03-17 RX ORDER — NIFEDIPINE 30 MG
30 TABLET, EXTENDED RELEASE 24 HR ORAL DAILY
Refills: 0 | Status: DISCONTINUED | OUTPATIENT
Start: 2022-03-17 | End: 2022-03-20

## 2022-03-17 RX ORDER — ACETAMINOPHEN 500 MG
1000 TABLET ORAL ONCE
Refills: 0 | Status: COMPLETED | OUTPATIENT
Start: 2022-03-17 | End: 2022-03-17

## 2022-03-17 RX ADMIN — SODIUM CHLORIDE 75 MILLILITER(S): 9 INJECTION, SOLUTION INTRAVENOUS at 10:33

## 2022-03-17 RX ADMIN — HEPARIN SODIUM 5000 UNIT(S): 5000 INJECTION INTRAVENOUS; SUBCUTANEOUS at 05:30

## 2022-03-17 RX ADMIN — ONDANSETRON 4 MILLIGRAM(S): 8 TABLET, FILM COATED ORAL at 10:29

## 2022-03-17 RX ADMIN — ONDANSETRON 4 MILLIGRAM(S): 8 TABLET, FILM COATED ORAL at 16:55

## 2022-03-17 RX ADMIN — Medication 50 MILLIGRAM(S): at 05:30

## 2022-03-17 RX ADMIN — HEPARIN SODIUM 5000 UNIT(S): 5000 INJECTION INTRAVENOUS; SUBCUTANEOUS at 21:29

## 2022-03-17 RX ADMIN — Medication 1000 MILLIGRAM(S): at 09:34

## 2022-03-17 RX ADMIN — TIOTROPIUM BROMIDE 1 CAPSULE(S): 18 CAPSULE ORAL; RESPIRATORY (INHALATION) at 12:33

## 2022-03-17 RX ADMIN — BUDESONIDE AND FORMOTEROL FUMARATE DIHYDRATE 2 PUFF(S): 160; 4.5 AEROSOL RESPIRATORY (INHALATION) at 21:29

## 2022-03-17 RX ADMIN — PANTOPRAZOLE SODIUM 40 MILLIGRAM(S): 20 TABLET, DELAYED RELEASE ORAL at 05:30

## 2022-03-17 RX ADMIN — BUDESONIDE AND FORMOTEROL FUMARATE DIHYDRATE 2 PUFF(S): 160; 4.5 AEROSOL RESPIRATORY (INHALATION) at 08:41

## 2022-03-17 RX ADMIN — Medication 30 MILLIGRAM(S): at 12:33

## 2022-03-17 RX ADMIN — GABAPENTIN 300 MILLIGRAM(S): 400 CAPSULE ORAL at 21:29

## 2022-03-17 RX ADMIN — HEPARIN SODIUM 5000 UNIT(S): 5000 INJECTION INTRAVENOUS; SUBCUTANEOUS at 13:45

## 2022-03-17 RX ADMIN — Medication 400 MILLIGRAM(S): at 08:40

## 2022-03-17 NOTE — CONSULT NOTE ADULT - ATTENDING COMMENTS
#Gastroparesis s/p gastric pacemaker and G-poem  #Hiatal hernia s/p multiple repairs, most recently revision of Toupet fundoplication 4/2021  #History of LEIGH ANN    --Plan for repeat manometry tomorrow with Dr. Lissette Fernandez  --PPI  --NPO at midnight

## 2022-03-17 NOTE — CONSULT NOTE ADULT - SUBJECTIVE AND OBJECTIVE BOX
HPI:      Allergies:  No Known Allergies        Hospital Medications:  budesonide 160 MICROgram(s)/formoterol 4.5 MICROgram(s) Inhaler 2 Puff(s) Inhalation two times a day  dextrose 5% + sodium chloride 0.45%. 1000 milliLiter(s) IV Continuous <Continuous>  gabapentin 300 milliGRAM(s) Oral at bedtime  heparin   Injectable 5000 Unit(s) SubCutaneous every 8 hours  metoclopramide 10 milliGRAM(s) Oral every 4 hours PRN  metoprolol succinate ER 50 milliGRAM(s) Oral daily  NIFEdipine XL 30 milliGRAM(s) Oral daily  ondansetron Injectable 4 milliGRAM(s) IV Push every 4 hours PRN  pantoprazole    Tablet 40 milliGRAM(s) Oral before breakfast  tiotropium 18 MICROgram(s) Capsule 1 Capsule(s) Inhalation daily  zolpidem 5 milliGRAM(s) Oral at bedtime PRN  zolpidem 5 milliGRAM(s) Oral at bedtime PRN      PMHX/PSHX:  HTN (hypertension)    Reactive airway disease    Asthma    GERD (gastroesophageal reflux disease)    Hiatal hernia    Diabetes mellitus    Anxiety    Low testosterone in male    Osteoarthritis    Intervertebral disc disorder    Rotator cuff tear, left    Shoulder pain, left    Depression    PE (pulmonary thromboembolism)    Insomnia    Gastroparesis    COVID-19 virus infection    COVID-19 vaccine series completed    H/O submucous nasal surgery    S/P laparoscopic fundoplication    S/P Achilles tendon repair    S/P carpal tunnel release    S/P tonsillectomy and adenoidectomy    H/O hiatal hernia    Presence of gastric pacemaker        Family history:  Family history of diabetes mellitus (Father, Sibling)    Family history of other heart disease (Mother)    Family history of prostate cancer (Father)    FHx: renal failure (Father, Mother)    Family history of asthma (Sibling, Mother)        Social History: no smoking    ROS:   General:  No fevers, chills or night sweats  ENT:  No sore throat or dysphagia  CV:  No pain or palpitations  Resp:  No dyspnea, cough or  wheezing  GI:  as above  Skin:  No rash or edema  Neuro: no weakness   Hematologic: no bleeding  Musculoskeletal: no muscle pain or join pain  Psych: no agitation     : no dysuria      PHYSICAL EXAM:   GENERAL:  NAD, Appears stated age  HEENT:  NC/AT,  conjunctivae clear and pink, sclera -anicteric  CHEST:  CTA B/L, Normal effort  HEART:  RRR S1/S2,  ABDOMEN:  Soft, non-tender, non-distended,  no masses   EXTREMITIES:  No cyanosis or Edema  SKIN:  Warm & Dry. No rash or erythema  NEURO:  Alert, oriented, no focal deficit    Vital Signs:  Vital Signs Last 24 Hrs  T(C): 36.8 (17 Mar 2022 12:56), Max: 37.5 (16 Mar 2022 21:12)  T(F): 98.2 (17 Mar 2022 12:56), Max: 99.5 (16 Mar 2022 21:12)  HR: 76 (17 Mar 2022 12:56) (60 - 96)  BP: 114/73 (17 Mar 2022 12:56) (114/73 - 147/85)  BP(mean): 102 (16 Mar 2022 15:30) (100 - 104)  RR: 18 (17 Mar 2022 12:56) (13 - 18)  SpO2: 91% (17 Mar 2022 12:56) (91% - 100%)  Daily     Daily     LABS:                        13.1   6.06  )-----------( 198      ( 17 Mar 2022 06:36 )             39.1     Mean Cell Volume: 89.7 fL (03-17-22 @ 06:36)    03-17    141  |  107  |  3<L>  ----------------------------<  104<H>  4.3   |  24  |  1.05    Ca    9.2      17 Mar 2022 06:36  Phos  4.0     03-17  Mg     2.00     03-17        PT/INR - ( 16 Mar 2022 07:20 )   PT: 13.7 sec;   INR: 1.18 ratio         PTT - ( 16 Mar 2022 07:20 )  PTT:34.3 sec                            13.1   6.06  )-----------( 198      ( 17 Mar 2022 06:36 )             39.1                         13.2   5.71  )-----------( 192      ( 16 Mar 2022 07:20 )             39.2                         13.5   6.37  )-----------( 155      ( 15 Mar 2022 08:37 )             37.7     Imaging:     HPI:   56 year old male, 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 Cruz Herrera, s/p G-POEM in 2019, b/l PE on Eliquis, depression, anemia, DM (not on medications), presenting to the ED for worsening of baseline heartburn, dysphagia, esophageal spasm, retching, nausea and vomiting in the last 3 weeks. Now, newly reporting constipation in the last 6 months. Patient is s/p hiatal hernia repair, Nissen fundoplication in 2011, and takedown of Nissen fundoplication, repair of recurrent hiatal hernia, revision fundoplication to partial Toupet fundoplication on 01/19/2016 by Dr. Edgard Whaley (General surgeon) at [place]. Now S/p recent redo of Toupet Fundoplication in 4/21.     Since being here, patient underwent barium esophagram which was normal, no hiatal hernia with no leak. Had CT abd/pelvis with no organic pathology to explain patient sxs. Underwent EGD on 3/16/22 with normal wrap, s/p biopsy pending. GI called given negative work up with ongoing sxs as noted above.     Allergies:  No Known Allergies        Hospital Medications:  budesonide 160 MICROgram(s)/formoterol 4.5 MICROgram(s) Inhaler 2 Puff(s) Inhalation two times a day  dextrose 5% + sodium chloride 0.45%. 1000 milliLiter(s) IV Continuous <Continuous>  gabapentin 300 milliGRAM(s) Oral at bedtime  heparin   Injectable 5000 Unit(s) SubCutaneous every 8 hours  metoclopramide 10 milliGRAM(s) Oral every 4 hours PRN  metoprolol succinate ER 50 milliGRAM(s) Oral daily  NIFEdipine XL 30 milliGRAM(s) Oral daily  ondansetron Injectable 4 milliGRAM(s) IV Push every 4 hours PRN  pantoprazole    Tablet 40 milliGRAM(s) Oral before breakfast  tiotropium 18 MICROgram(s) Capsule 1 Capsule(s) Inhalation daily  zolpidem 5 milliGRAM(s) Oral at bedtime PRN  zolpidem 5 milliGRAM(s) Oral at bedtime PRN      PMHX/PSHX:  HTN (hypertension)    Reactive airway disease    Asthma    GERD (gastroesophageal reflux disease)    Hiatal hernia    Diabetes mellitus    Anxiety    Low testosterone in male    Osteoarthritis    Intervertebral disc disorder    Rotator cuff tear, left    Shoulder pain, left    Depression    PE (pulmonary thromboembolism)    Insomnia    Gastroparesis    COVID-19 virus infection    COVID-19 vaccine series completed    H/O submucous nasal surgery    S/P laparoscopic fundoplication    S/P Achilles tendon repair    S/P carpal tunnel release    S/P tonsillectomy and adenoidectomy    H/O hiatal hernia    Presence of gastric pacemaker        Family history:  Family history of diabetes mellitus (Father, Sibling)    Family history of other heart disease (Mother)    Family history of prostate cancer (Father)    FHx: renal failure (Father, Mother)    Family history of asthma (Sibling, Mother)        Social History: no smoking    ROS:   General:  No fevers, chills or night sweats  ENT:  No sore throat or dysphagia  CV:  No pain or palpitations  Resp:  No dyspnea, cough or  wheezing  GI:  as above  Skin:  No rash or edema  Neuro: no weakness   Hematologic: no bleeding  Musculoskeletal: no muscle pain or join pain  Psych: no agitation     : no dysuria      PHYSICAL EXAM:   GENERAL:  NAD, Appears stated age  HEENT:  NC/AT,  conjunctivae clear and pink, sclera -anicteric  CHEST:  CTA B/L, Normal effort  HEART:  RRR S1/S2,  ABDOMEN:  Soft, non-tender, non-distended,  no masses   EXTREMITIES:  No cyanosis or Edema  SKIN:  Warm & Dry. No rash or erythema  NEURO:  Alert, oriented, no focal deficit    Vital Signs:  Vital Signs Last 24 Hrs  T(C): 36.8 (17 Mar 2022 12:56), Max: 37.5 (16 Mar 2022 21:12)  T(F): 98.2 (17 Mar 2022 12:56), Max: 99.5 (16 Mar 2022 21:12)  HR: 76 (17 Mar 2022 12:56) (60 - 96)  BP: 114/73 (17 Mar 2022 12:56) (114/73 - 147/85)  BP(mean): 102 (16 Mar 2022 15:30) (100 - 104)  RR: 18 (17 Mar 2022 12:56) (13 - 18)  SpO2: 91% (17 Mar 2022 12:56) (91% - 100%)  Daily     Daily     LABS:                        13.1   6.06  )-----------( 198      ( 17 Mar 2022 06:36 )             39.1     Mean Cell Volume: 89.7 fL (03-17-22 @ 06:36)    03-17    141  |  107  |  3<L>  ----------------------------<  104<H>  4.3   |  24  |  1.05    Ca    9.2      17 Mar 2022 06:36  Phos  4.0     03-17  Mg     2.00     03-17        PT/INR - ( 16 Mar 2022 07:20 )   PT: 13.7 sec;   INR: 1.18 ratio         PTT - ( 16 Mar 2022 07:20 )  PTT:34.3 sec                            13.1   6.06  )-----------( 198      ( 17 Mar 2022 06:36 )             39.1                         13.2   5.71  )-----------( 192      ( 16 Mar 2022 07:20 )             39.2                         13.5   6.37  )-----------( 155      ( 15 Mar 2022 08:37 )             37.7     Imaging:  Reviewed.  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 Cruz Herrera, s/p G-POEM in 2019, b/l PE on Eliquis, depression, anemia, DM (not on medications), who presented 3/14 to the ED for worsening of baseline heartburn, dysphagia, esophageal spasm, retching, nausea and vomiting in the last 3 weeks. Now, newly reporting constipation in the last 6 months. Patient is s/p hiatal hernia repair, Nissen fundoplication in 2011, and takedown of Nissen fundoplication, repair of recurrent hiatal hernia, revision fundoplication to partial Toupet fundoplication on 01/19/2016 by Dr. Edgard Whaley (General surgeon) at [place]. Now S/p recent redo of Toupet Fundoplication in 4/21.     Since being here, patient underwent barium esophagram which was unremarkable, no hiatal hernia and no leak. He had CT abd/pelvis with no organic pathology to explain patient sxs. He underwent EGD on 3/16/22 with intact wrap, s/p biopsy pending. GI called given negative work up with ongoing sxs as noted above.     Allergies:  No Known Allergies        Hospital Medications:  budesonide 160 MICROgram(s)/formoterol 4.5 MICROgram(s) Inhaler 2 Puff(s) Inhalation two times a day  dextrose 5% + sodium chloride 0.45%. 1000 milliLiter(s) IV Continuous <Continuous>  gabapentin 300 milliGRAM(s) Oral at bedtime  heparin   Injectable 5000 Unit(s) SubCutaneous every 8 hours  metoclopramide 10 milliGRAM(s) Oral every 4 hours PRN  metoprolol succinate ER 50 milliGRAM(s) Oral daily  NIFEdipine XL 30 milliGRAM(s) Oral daily  ondansetron Injectable 4 milliGRAM(s) IV Push every 4 hours PRN  pantoprazole    Tablet 40 milliGRAM(s) Oral before breakfast  tiotropium 18 MICROgram(s) Capsule 1 Capsule(s) Inhalation daily  zolpidem 5 milliGRAM(s) Oral at bedtime PRN  zolpidem 5 milliGRAM(s) Oral at bedtime PRN      PMHX/PSHX:  HTN (hypertension)    Reactive airway disease    Asthma    GERD (gastroesophageal reflux disease)    Hiatal hernia    Diabetes mellitus    Anxiety    Low testosterone in male    Osteoarthritis    Intervertebral disc disorder    Rotator cuff tear, left    Shoulder pain, left    Depression    PE (pulmonary thromboembolism)    Insomnia    Gastroparesis    COVID-19 virus infection    COVID-19 vaccine series completed    H/O submucous nasal surgery    S/P laparoscopic fundoplication    S/P Achilles tendon repair    S/P carpal tunnel release    S/P tonsillectomy and adenoidectomy    H/O hiatal hernia    Presence of gastric pacemaker        Family history:  Family history of diabetes mellitus (Father, Sibling)    Family history of other heart disease (Mother)    Family history of prostate cancer (Father)    FHx: renal failure (Father, Mother)    Family history of asthma (Sibling, Mother)        Social History: no smoking    ROS:   14-point ROS reviewed and negative except as per HPI above    PHYSICAL EXAM:   GENERAL:  NAD, Appears stated age  HEENT:  NC/AT,  conjunctivae clear and pink, sclera -anicteric  CHEST:  CTA B/L, Normal effort  HEART:  RRR S1/S2, no m/r/g  ABDOMEN:  Soft, non-tender, non-distended,  no masses   EXTREMITIES:  No cyanosis or Edema  SKIN:  Warm & Dry. No rash or erythema  NEURO:  Alert, oriented, no focal deficit    Vital Signs:  Vital Signs Last 24 Hrs  T(C): 36.8 (17 Mar 2022 12:56), Max: 37.5 (16 Mar 2022 21:12)  T(F): 98.2 (17 Mar 2022 12:56), Max: 99.5 (16 Mar 2022 21:12)  HR: 76 (17 Mar 2022 12:56) (60 - 96)  BP: 114/73 (17 Mar 2022 12:56) (114/73 - 147/85)  BP(mean): 102 (16 Mar 2022 15:30) (100 - 104)  RR: 18 (17 Mar 2022 12:56) (13 - 18)  SpO2: 91% (17 Mar 2022 12:56) (91% - 100%)  Daily     Daily     LABS:                        13.1   6.06  )-----------( 198      ( 17 Mar 2022 06:36 )             39.1     Mean Cell Volume: 89.7 fL (03-17-22 @ 06:36)    03-17    141  |  107  |  3<L>  ----------------------------<  104<H>  4.3   |  24  |  1.05    Ca    9.2      17 Mar 2022 06:36  Phos  4.0     03-17  Mg     2.00     03-17        PT/INR - ( 16 Mar 2022 07:20 )   PT: 13.7 sec;   INR: 1.18 ratio         PTT - ( 16 Mar 2022 07:20 )  PTT:34.3 sec                            13.1   6.06  )-----------( 198      ( 17 Mar 2022 06:36 )             39.1                         13.2   5.71  )-----------( 192      ( 16 Mar 2022 07:20 )             39.2                         13.5   6.37  )-----------( 155      ( 15 Mar 2022 08:37 )             37.7     Imaging:  Reviewed.

## 2022-03-17 NOTE — CHART NOTE - NSCHARTNOTEFT_GEN_A_CORE
Patient is a 56y old  Male who presents with a chief complaint of hiatal hernia repair with possible thoracotomy (16 Mar 2022 13:55) Pt underwent a EGD, Bx on 3/17/22. POst op pt states he is trying to tolerate clears but having a difficult time swallowing. Pt voided.      Vital Signs Last 24 Hrs  T(C): 37 (16 Mar 2022 23:35), Max: 37.5 (16 Mar 2022 21:12)  T(F): 98.6 (16 Mar 2022 23:35), Max: 99.5 (16 Mar 2022 21:12)  HR: 60 (16 Mar 2022 23:35) (60 - 96)  BP: 126/69 (16 Mar 2022 23:35) (108/65 - 147/85)  BP(mean): 102 (16 Mar 2022 15:30) (76 - 104)  RR: 18 (16 Mar 2022 23:35) (13 - 20)  SpO2: 99% (16 Mar 2022 23:35) (97% - 100%)    General: WN/WD NAD  Neurology: A&Ox3, nonfocal, CORDERO x 4  Respiratory: CTA B/L  CV: RRR, S1S2, no murmurs, rubs or gallops  Abdominal: Soft, NT, ND +BS, Last BM  Extremities: No edema, + peripheral pulses                          13.2   5.71  )-----------( 192      ( 16 Mar 2022 07:20 )             39.2     03-16    142  |  107  |  4<L>  ----------------------------<  104<H>  3.8   |  25  |  1.03    Ca    9.2      16 Mar 2022 07:20  Phos  4.2     03-16  Mg     1.80     03-16      PT/INR - ( 16 Mar 2022 07:20 )   PT: 13.7 sec;   INR: 1.18 ratio         PTT - ( 16 Mar 2022 07:20 )  PTT:34.3 sec    CXR    MEDICATIONS  (STANDING):  budesonide 160 MICROgram(s)/formoterol 4.5 MICROgram(s) Inhaler 2 Puff(s) Inhalation two times a day  gabapentin 300 milliGRAM(s) Oral at bedtime  heparin   Injectable 5000 Unit(s) SubCutaneous every 8 hours  influenza   Vaccine 0.5 milliLiter(s) IntraMuscular once  metoprolol succinate ER 50 milliGRAM(s) Oral daily  pantoprazole    Tablet 40 milliGRAM(s) Oral before breakfast  tiotropium 18 MICROgram(s) Capsule 1 Capsule(s) Inhalation daily    MEDICATIONS  (PRN):  metoclopramide 10 milliGRAM(s) Oral every 4 hours PRN nausea  ondansetron Injectable 4 milliGRAM(s) IV Push every 4 hours PRN Nausea and/or Vomiting  zolpidem 5 milliGRAM(s) Oral at bedtime PRN Insomnia  zolpidem 5 milliGRAM(s) Oral at bedtime PRN Insomnia

## 2022-03-17 NOTE — CONSULT NOTE ADULT - ASSESSMENT
#Gastroparesis   -s/p gastric pacemaker on 10/22/2018 by Dr. Cruz Herrera, s/p G-POEM in 2019,    #Atypical chest pain  #N/V  #Heartburn  Barium esophagram normal, no hiatal hernia with no leak. Had CT abd/pelvis with no organic pathology to explain patient sxs. Underwent EGD on 3/16/22 with intact wrap, s/p biopsy pending. Reasonable to obtain esophageal manometry to assess motility dysfunction as alternative etiology for worsening of sxs.     Recommendations:  -Plan for esophageal manometry tomorrow   -Continue PPI therapy    GI will continue to follow    Recommendations preliminary until signed by attending.     Umesh Joy MD  Gastroenterology/Hepatology Fellow  1st option: [phone] (text or call), ONLY available from 7:00 am to 5:00 pm.   **Contact on-call GI fellow via answering service ([phone]) from 5pm-7am AND on weekends/holidays**  2nd option: Available via [employer] Teams  3rd option: Pager: [phone]         #Gastroparesis: s/p gastric pacemaker on 10/22/2018 by Dr. Cruz Herrera, s/p G-POEM in 2019,  #Atypical chest pain  #N/V  #Heartburn    Recommendations:  -Plan for esophageal manometry tomorrow with Dr. Fernandez  -Continue PPI therapy    GI will continue to follow    Recommendations preliminary until signed by attending.     Umesh Joy MD  Gastroenterology/Hepatology Fellow  1st option: [phone] (text or call), ONLY available from 7:00 am to 5:00 pm.   **Contact on-call GI fellow via answering service ([phone]) from 5pm-7am AND on weekends/holidays**  2nd option: Available via [employer] Teams  3rd option: Pager: [phone]

## 2022-03-18 LAB
ANION GAP SERPL CALC-SCNC: 12 MMOL/L — SIGNIFICANT CHANGE UP (ref 7–14)
APTT BLD: 40.4 SEC — HIGH (ref 27–36.3)
BLD GP AB SCN SERPL QL: NEGATIVE — SIGNIFICANT CHANGE UP
BUN SERPL-MCNC: 5 MG/DL — LOW (ref 7–23)
CALCIUM SERPL-MCNC: 8.8 MG/DL — SIGNIFICANT CHANGE UP (ref 8.4–10.5)
CHLORIDE SERPL-SCNC: 105 MMOL/L — SIGNIFICANT CHANGE UP (ref 98–107)
CO2 SERPL-SCNC: 24 MMOL/L — SIGNIFICANT CHANGE UP (ref 22–31)
CREAT SERPL-MCNC: 1.07 MG/DL — SIGNIFICANT CHANGE UP (ref 0.5–1.3)
EGFR: 81 ML/MIN/1.73M2 — SIGNIFICANT CHANGE UP
GLUCOSE SERPL-MCNC: 114 MG/DL — HIGH (ref 70–99)
HCT VFR BLD CALC: 38.2 % — LOW (ref 39–50)
HGB BLD-MCNC: 13.4 G/DL — SIGNIFICANT CHANGE UP (ref 13–17)
INR BLD: 1.11 RATIO — SIGNIFICANT CHANGE UP (ref 0.88–1.16)
MAGNESIUM SERPL-MCNC: 1.9 MG/DL — SIGNIFICANT CHANGE UP (ref 1.6–2.6)
MCHC RBC-ENTMCNC: 30.6 PG — SIGNIFICANT CHANGE UP (ref 27–34)
MCHC RBC-ENTMCNC: 35.1 GM/DL — SIGNIFICANT CHANGE UP (ref 32–36)
MCV RBC AUTO: 87.2 FL — SIGNIFICANT CHANGE UP (ref 80–100)
NRBC # BLD: 0 /100 WBCS — SIGNIFICANT CHANGE UP
NRBC # FLD: 0 K/UL — SIGNIFICANT CHANGE UP
PHOSPHATE SERPL-MCNC: 3.2 MG/DL — SIGNIFICANT CHANGE UP (ref 2.5–4.5)
PLATELET # BLD AUTO: 201 K/UL — SIGNIFICANT CHANGE UP (ref 150–400)
POTASSIUM SERPL-MCNC: 3.7 MMOL/L — SIGNIFICANT CHANGE UP (ref 3.5–5.3)
POTASSIUM SERPL-SCNC: 3.7 MMOL/L — SIGNIFICANT CHANGE UP (ref 3.5–5.3)
PROTHROM AB SERPL-ACNC: 12.9 SEC — SIGNIFICANT CHANGE UP (ref 10.5–13.4)
RBC # BLD: 4.38 M/UL — SIGNIFICANT CHANGE UP (ref 4.2–5.8)
RBC # FLD: 13.4 % — SIGNIFICANT CHANGE UP (ref 10.3–14.5)
RH IG SCN BLD-IMP: POSITIVE — SIGNIFICANT CHANGE UP
SODIUM SERPL-SCNC: 141 MMOL/L — SIGNIFICANT CHANGE UP (ref 135–145)
WBC # BLD: 6.09 K/UL — SIGNIFICANT CHANGE UP (ref 3.8–10.5)
WBC # FLD AUTO: 6.09 K/UL — SIGNIFICANT CHANGE UP (ref 3.8–10.5)

## 2022-03-18 PROCEDURE — 91010 ESOPHAGUS MOTILITY STUDY: CPT | Mod: 26

## 2022-03-18 PROCEDURE — 99232 SBSQ HOSP IP/OBS MODERATE 35: CPT

## 2022-03-18 DEVICE — CATH ENDOFLIP MEASURE 16MM: Type: IMPLANTABLE DEVICE | Status: FUNCTIONAL

## 2022-03-18 RX ORDER — BETHANECHOL CHLORIDE 25 MG
5 TABLET ORAL THREE TIMES A DAY
Refills: 0 | Status: DISCONTINUED | OUTPATIENT
Start: 2022-03-18 | End: 2022-03-20

## 2022-03-18 RX ORDER — ACETAMINOPHEN 500 MG
1000 TABLET ORAL ONCE
Refills: 0 | Status: COMPLETED | OUTPATIENT
Start: 2022-03-18 | End: 2022-03-18

## 2022-03-18 RX ADMIN — BUDESONIDE AND FORMOTEROL FUMARATE DIHYDRATE 2 PUFF(S): 160; 4.5 AEROSOL RESPIRATORY (INHALATION) at 10:28

## 2022-03-18 RX ADMIN — TIOTROPIUM BROMIDE 1 CAPSULE(S): 18 CAPSULE ORAL; RESPIRATORY (INHALATION) at 10:31

## 2022-03-18 RX ADMIN — GABAPENTIN 300 MILLIGRAM(S): 400 CAPSULE ORAL at 22:12

## 2022-03-18 RX ADMIN — Medication 400 MILLIGRAM(S): at 22:12

## 2022-03-18 RX ADMIN — BUDESONIDE AND FORMOTEROL FUMARATE DIHYDRATE 2 PUFF(S): 160; 4.5 AEROSOL RESPIRATORY (INHALATION) at 22:12

## 2022-03-18 RX ADMIN — Medication 1000 MILLIGRAM(S): at 23:15

## 2022-03-18 RX ADMIN — HEPARIN SODIUM 5000 UNIT(S): 5000 INJECTION INTRAVENOUS; SUBCUTANEOUS at 22:12

## 2022-03-18 RX ADMIN — HEPARIN SODIUM 5000 UNIT(S): 5000 INJECTION INTRAVENOUS; SUBCUTANEOUS at 05:32

## 2022-03-19 RX ORDER — ACETAMINOPHEN 500 MG
1000 TABLET ORAL ONCE
Refills: 0 | Status: COMPLETED | OUTPATIENT
Start: 2022-03-19 | End: 2022-03-19

## 2022-03-19 RX ORDER — APIXABAN 2.5 MG/1
2.5 TABLET, FILM COATED ORAL EVERY 12 HOURS
Refills: 0 | Status: DISCONTINUED | OUTPATIENT
Start: 2022-03-19 | End: 2022-03-20

## 2022-03-19 RX ORDER — SIMETHICONE 80 MG/1
80 TABLET, CHEWABLE ORAL ONCE
Refills: 0 | Status: COMPLETED | OUTPATIENT
Start: 2022-03-19 | End: 2022-03-19

## 2022-03-19 RX ADMIN — BUDESONIDE AND FORMOTEROL FUMARATE DIHYDRATE 2 PUFF(S): 160; 4.5 AEROSOL RESPIRATORY (INHALATION) at 22:34

## 2022-03-19 RX ADMIN — Medication 400 MILLIGRAM(S): at 19:07

## 2022-03-19 RX ADMIN — Medication 30 MILLIGRAM(S): at 05:37

## 2022-03-19 RX ADMIN — SIMETHICONE 80 MILLIGRAM(S): 80 TABLET, CHEWABLE ORAL at 22:58

## 2022-03-19 RX ADMIN — Medication 5 MILLIGRAM(S): at 18:29

## 2022-03-19 RX ADMIN — PANTOPRAZOLE SODIUM 40 MILLIGRAM(S): 20 TABLET, DELAYED RELEASE ORAL at 05:37

## 2022-03-19 RX ADMIN — APIXABAN 2.5 MILLIGRAM(S): 2.5 TABLET, FILM COATED ORAL at 18:29

## 2022-03-19 RX ADMIN — HEPARIN SODIUM 5000 UNIT(S): 5000 INJECTION INTRAVENOUS; SUBCUTANEOUS at 05:36

## 2022-03-19 RX ADMIN — GABAPENTIN 300 MILLIGRAM(S): 400 CAPSULE ORAL at 22:34

## 2022-03-19 RX ADMIN — Medication 50 MILLIGRAM(S): at 06:08

## 2022-03-19 RX ADMIN — Medication 5 MILLIGRAM(S): at 14:31

## 2022-03-19 RX ADMIN — TIOTROPIUM BROMIDE 1 CAPSULE(S): 18 CAPSULE ORAL; RESPIRATORY (INHALATION) at 10:35

## 2022-03-19 RX ADMIN — Medication 5 MILLIGRAM(S): at 10:36

## 2022-03-19 RX ADMIN — BUDESONIDE AND FORMOTEROL FUMARATE DIHYDRATE 2 PUFF(S): 160; 4.5 AEROSOL RESPIRATORY (INHALATION) at 08:54

## 2022-03-19 NOTE — PROGRESS NOTE ADULT - SUBJECTIVE AND OBJECTIVE BOX
Subjective: "I was able to do ok yesterday with a few things" Pt states tolerating some liquids, an Ensure and string beans yesterday. No N/v. Some esophageal spasm but tolerable. On IVF. NPO for mannometry today with GI.     Vital Signs:  Vital Signs Last 24 Hrs  T(C): 37.1 (03-18-22 @ 13:24), Max: 37.4 (03-17-22 @ 20:10)  T(F): 98.8 (03-18-22 @ 13:24), Max: 99.3 (03-17-22 @ 20:10)  HR: 83 (03-18-22 @ 13:24) (64 - 83)  BP: 143/76 (03-18-22 @ 13:24) (100/65 - 143/76)  RR: 16 (03-18-22 @ 13:24) (16 - 18)  SpO2: 98% (03-18-22 @ 13:24) (98% - 100%) on (O2)    Telemetry/Alarms:  General: WN/WD NAD  Neurology: Awake, nonfocal, CORDERO x 4  Eyes: Scleras clear, PERRLA/ EOMI, Gross vision intact  ENT:Gross hearing intact, grossly patent pharynx, no stridor  Neck: Neck supple, trachea midline, No JVD,   Respiratory: CTA B/L, No wheezing, rales, rhonchi  CV: RRR, S1S2, no murmurs, rubs or gallops  Abdominal: Soft, NT, ND +BS, no BM. c/o constipation  Extremities: No edema, + peripheral pulses  Skin: No Rashes, Hematoma, Ecchymosis  Lymphatic: No Neck, axilla, groin LAD  Psych: Oriented x 3, normal affect    Relevant labs, radiology and Medications reviewed                        13.4   6.09  )-----------( 201      ( 18 Mar 2022 07:10 )             38.2     03-18    141  |  105  |  5<L>  ----------------------------<  114<H>  3.7   |  24  |  1.07    Ca    8.8      18 Mar 2022 06:38  Phos  3.2     03-18  Mg     1.90     03-18      PT/INR - ( 18 Mar 2022 06:53 )   PT: 12.9 sec;   INR: 1.11 ratio         PTT - ( 18 Mar 2022 06:53 )  PTT:40.4 sec  MEDICATIONS  (STANDING):  budesonide 160 MICROgram(s)/formoterol 4.5 MICROgram(s) Inhaler 2 Puff(s) Inhalation two times a day  dextrose 5% + sodium chloride 0.45%. 1000 milliLiter(s) (75 mL/Hr) IV Continuous <Continuous>  gabapentin 300 milliGRAM(s) Oral at bedtime  heparin   Injectable 5000 Unit(s) SubCutaneous every 8 hours  metoprolol succinate ER 50 milliGRAM(s) Oral daily  NIFEdipine XL 30 milliGRAM(s) Oral daily  pantoprazole    Tablet 40 milliGRAM(s) Oral before breakfast  tiotropium 18 MICROgram(s) Capsule 1 Capsule(s) Inhalation daily    MEDICATIONS  (PRN):  metoclopramide 10 milliGRAM(s) Oral every 4 hours PRN nausea  ondansetron Injectable 4 milliGRAM(s) IV Push every 4 hours PRN Nausea and/or Vomiting  zolpidem 5 milliGRAM(s) Oral at bedtime PRN Insomnia  zolpidem 5 milliGRAM(s) Oral at bedtime PRN Insomnia    Pertinent Physical Exam  I&O's Summary        Assessment  56y Male  w/ PAST MEDICAL & SURGICAL HISTORY:  HTN (hypertension)    Reactive airway disease    Asthma    GERD (gastroesophageal reflux disease)    Hiatal hernia    Diabetes mellitus    Anxiety  PTSD - [employer] responnder    Low testosterone in male    Osteoarthritis    Intervertebral disc disorder  bulging discs cervical and lumbar region    Shoulder pain, left    Depression    PE (pulmonary thromboembolism)    Insomnia    Gastroparesis    COVID-19 virus infection  h/o 4/2020    COVID-19 vaccine series completed    H/O submucous nasal surgery  septoplasty    S/P laparoscopic fundoplication  2x    S/P Achilles tendon repair  right - done 2x    S/P carpal tunnel release  right    S/P tonsillectomy and adenoidectomy    H/O hiatal hernia  repaired    Presence of gastric pacemaker  2018    admitted with complaints of Patient is a 56y old  Male who presents with a chief complaint of hiatal hernia repair with possible thoracotomy (17 Mar 2022 14:47)  56 y/o male w/ PMH of PE (7/2018, on Eliquis), Asthma, HTN, gastroparesis due to H/o type II DM  & vagal nerve disorder h/o placement of gastric pacemaker  on 10/22/18, recurrent hiatal hernia repair, revision of Toupet fundoplication on 4/12/21 presents with 3-day history of worsening severe midsternal and epigastric abdominal pain, nausea and retching/vomiting. Pt s/p EGD with mucousa biopsy 3/16/2022  Kept inhouse for poor po intake. Advanced to soft diet on 3/17. Now NPO for manometry with GI    PLAN  Neuro: Pain management  Pulm: Encourage coughing, deep breathing and use of incentive spirometry.   Cardio: Monitor telemetry/alarms  GI: NPO for procedure. Cont diet per GI, Cont IVF for now.   Renal: monitor urine output, supplement electrolytes as needed  Vasc: Heparin SC/SCDs for DVT prophylaxis  Heme: Stable H/H. .   ID: Off antibiotics. Stable.  Therapy: OOB/ambulate  Disposition: Aim to D/C to home once pt stable.   Discussed with Cardiothoracic Team at AM rounds.  
   Subjective: No MARIA C.    Vital Signs:  Vital Signs Last 24 Hrs  T(C): 36.5 (03-19-22 @ 05:34), Max: 37.2 (03-18-22 @ 20:06)  T(F): 97.7 (03-19-22 @ 05:34), Max: 98.9 (03-18-22 @ 20:06)  HR: 78 (03-19-22 @ 05:34) (71 - 93)  BP: 132/76 (03-19-22 @ 05:34) (110/68 - 161/82)  RR: 18 (03-19-22 @ 05:34) (12 - 20)  SpO2: 100% (03-19-22 @ 05:34) (98% - 100%) on (O2)    Telemetry/Alarms:  General: WN/WD NAD  Neurology: Awake, nonfocal, CORDERO x 4  Eyes: Scleras clear, PERRLA/ EOMI, Gross vision intact  ENT:Gross hearing intact, grossly patent pharynx, no stridor  Neck: Neck supple, trachea midline, No JVD,   Respiratory: CTA B/L, No wheezing, rales, rhonchi  CV: RRR, S1S2, no murmurs, rubs or gallops  Abdominal: Soft, NT, ND +BS,   Extremities: No edema, + peripheral pulses  Skin: No Rashes, Hematoma, Ecchymosis  Lymphatic: No Neck, axilla, groin LAD  Psych: Oriented x 3, normal affect  Incisions:   Tubes:  Relevant labs, radiology and Medications reviewed                        13.4   6.09  )-----------( 201      ( 18 Mar 2022 07:10 )             38.2     03-18    141  |  105  |  5<L>  ----------------------------<  114<H>  3.7   |  24  |  1.07    Ca    8.8      18 Mar 2022 06:38  Phos  3.2     03-18  Mg     1.90     03-18      PT/INR - ( 18 Mar 2022 06:53 )   PT: 12.9 sec;   INR: 1.11 ratio         PTT - ( 18 Mar 2022 06:53 )  PTT:40.4 sec  ABG:  CXR:  MEDICATIONS  (STANDING):  bethanechol 5 milliGRAM(s) Oral three times a day  budesonide 160 MICROgram(s)/formoterol 4.5 MICROgram(s) Inhaler 2 Puff(s) Inhalation two times a day  gabapentin 300 milliGRAM(s) Oral at bedtime  heparin   Injectable 5000 Unit(s) SubCutaneous every 8 hours  metoprolol succinate ER 50 milliGRAM(s) Oral daily  NIFEdipine XL 30 milliGRAM(s) Oral daily  pantoprazole    Tablet 40 milliGRAM(s) Oral before breakfast  tiotropium 18 MICROgram(s) Capsule 1 Capsule(s) Inhalation daily    MEDICATIONS  (PRN):  metoclopramide 10 milliGRAM(s) Oral every 4 hours PRN nausea  ondansetron Injectable 4 milliGRAM(s) IV Push every 4 hours PRN Nausea and/or Vomiting  zolpidem 5 milliGRAM(s) Oral at bedtime PRN Insomnia  zolpidem 5 milliGRAM(s) Oral at bedtime PRN Insomnia    Pertinent Physical Exam  I&O's Summary    18 Mar 2022 07:01  -  19 Mar 2022 07:00  --------------------------------------------------------  IN: 1285 mL / OUT: 700 mL / NET: 585 mL        Assessment  56y Male  w/ PAST MEDICAL & SURGICAL HISTORY:  HTN (hypertension)    Reactive airway disease    Asthma    GERD (gastroesophageal reflux disease)    Hiatal hernia    Diabetes mellitus    Anxiety  PTSD - [employer] responnder    Low testosterone in male    Osteoarthritis    Intervertebral disc disorder  bulging discs cervical and lumbar region    Shoulder pain, left    Depression    PE (pulmonary thromboembolism)    Insomnia    Gastroparesis    COVID-19 virus infection  h/o 4/2020    COVID-19 vaccine series completed    H/O submucous nasal surgery  septoplasty    S/P laparoscopic fundoplication  2x    S/P Achilles tendon repair  right - done 2x    S/P carpal tunnel release  right    S/P tonsillectomy and adenoidectomy    H/O hiatal hernia  repaired    Presence of gastric pacemaker  2018    admitted with complaints of Patient is a 56y old  Male who presents with a chief complaint of hiatal hernia repair with possible thoracotomy (18 Mar 2022 13:35)  .  On (Date), patient underwent Esophagogastroduodenoscopy (EGD) under anesthesia    . Postoperative course/issues:    PLAN  Neuro: Pain management  Pulm: Encourage coughing, deep breathing and use of incentive spirometry. Wean off supplemental oxygen as able. Daily CXR.   Cardio: Monitor telemetry/alarms  GI: s/p esophageal dilation-tolerating soft diet. Continue stool softeners.  Renal: monitor urine output, supplement electrolytes as needed  Vasc: Heparin SC/SCDs for DVT prophylaxis  Heme: Stable H/H. .   ID: Off antibiotics. Stable.  Therapy: OOB/ambulate  Discussed with Cardiothoracic Team at AM rounds.  
S:  pt  seen and examined with Dr Javier  "I feel like liquid is sitting above my stomach after I eat"   pt states swallowing feels slow and he experiences epigastric pressure and a "twisting" sensation  pt was tolerating clears this AM      O: vitals as documented in flowsheets  afebrile    PHYSICAL EXAM  General: WN/WD NAD  Neurology: Awake, nonfocal, CORDERO x 4  Eyes: Scleras clear, PERRLA/ EOMI, Gross vision intact  ENT:Gross hearing intact, grossly patent pharynx, no stridor  Neck: Neck supple, trachea midline, No JVD,   Respiratory: CTA B/L, No wheezing, rales, rhonchi  CV: RRR, S1S2, no murmurs, rubs or gallops  Abdominal: Soft, NT, ND +BS,   Extremities: No edema, + peripheral pulses  Skin: No Rashes, Hematoma, Ecchymosis  Lymphatic: No Neck, axilla, groin LAD  Psych: Oriented x 3, normal affect    Relevant labs, radiology and Medications reviewed    Marital Status:  ( X )    (   ) Single    (   )    (  )   Lives with: (  ) alone  (  ) children   ( X ) spouse   (  ) parents  (  ) other  Recent Travel: No recent travel  Occupation:    Substance Use (street drugs): ( x ) never used  (  ) other:  Tobacco Usage:  ( x  ) never smoked   (   ) former smoker   (   ) current smoker  (     ) pack year  Alcohol Usage: None     Assessment and Plan:   54 y/o male w/ PMH of PE (7/2018, on Eliquis), Asthma, HTN, gastroparesis due to H/o type II DM  & vagal nerve disorder h/o placement of gastric pacemaker  on 10/22/18, recurrent hiatal hernia repair, revision of Toupet fundoplication on 4/12/21 presents with 3-day history of worsening severe midsternal and epigastric abdominal pain, nausea and retching/vomiting. Pt s/p EGD with mucousa biopsy 3/16/2022  - Dr Javier reached out to GI team and pt  will have manometry study completed tomorrow; COVID swab sent 3/14 is valid for endoscopy suite  - diet advanced to soft for today, NPO p MN with IV fluid  - pain control  - nifedipine started for esophageal muscle contractions  - encourage mobilization  - DVT/GI PPx    Della HURD [id number]        
ANESTHESIA POSTOP CHECK    56y Male POSTOP DAY 1 S/P     [ X] NO APPARENT ANESTHESIA COMPLICATIONS      Comments: 
   Subjective: Pt seen and assessed at bedside by team this AM. Pt endorsing mid epigastric pressure. PT did not have any chest pain, SOB, fevers/chills, N/V/D/C. Pt is +/+ w/ regard to bowel movements.    Vital Signs:  Vital Signs Last 24 Hrs  T(C): 37.4 (03-15-22 @ 12:23), Max: 37.4 (03-15-22 @ 12:23)  T(F): 99.4 (03-15-22 @ 12:23), Max: 99.4 (03-15-22 @ 12:23)  HR: 62 (03-15-22 @ 12:23) (62 - 82)  BP: 137/82 (03-15-22 @ 12:23) (114/58 - 153/81)  RR: 16 (03-15-22 @ 12:23) (16 - 19)  SpO2: 100% (03-15-22 @ 12:23) (100% - 100%) on (O2)    Telemetry/Alarms:  General: WN/WD NAD  Neurology: Awake, nonfocal, CORDERO x 4  Eyes: Scleras clear, PERRLA/ EOMI, Gross vision intact  ENT:Gross hearing intact, grossly patent pharynx, no stridor  Neck: Neck supple, trachea midline, No JVD,   Respiratory: CTA B/L, No wheezing, rales, rhonchi  CV: RRR, S1S2, no murmurs, rubs or gallops  Abdominal: Soft, NT, ND +BS,   Extremities: No edema, + peripheral pulses  Skin: No Rashes, Hematoma, Ecchymosis  Lymphatic: No Neck, axilla, groin LAD  Psych: Oriented x 3, normal affect  Relevant labs, radiology and Medications reviewed                        13.5   6.37  )-----------( 155      ( 15 Mar 2022 08:37 )             37.7     03-15    139  |  105  |  8   ----------------------------<  104<H>  3.7   |  24  |  1.11    Ca    8.7      15 Mar 2022 08:37  Phos  2.9     03-15  Mg     1.70     03-15    TPro  7.6  /  Alb  4.5  /  TBili  1.7<H>  /  DBili  x   /  AST  48<H>  /  ALT  70<H>  /  AlkPhos  71  03-14      MEDICATIONS  (STANDING):  budesonide 160 MICROgram(s)/formoterol 4.5 MICROgram(s) Inhaler 2 Puff(s) Inhalation two times a day  gabapentin 300 milliGRAM(s) Oral at bedtime  heparin   Injectable 5000 Unit(s) SubCutaneous every 8 hours  influenza   Vaccine 0.5 milliLiter(s) IntraMuscular once  metoprolol succinate ER 50 milliGRAM(s) Oral daily  pantoprazole    Tablet 40 milliGRAM(s) Oral before breakfast  sodium chloride 0.9%. 1000 milliLiter(s) (75 mL/Hr) IV Continuous <Continuous>  tiotropium 18 MICROgram(s) Capsule 1 Capsule(s) Inhalation daily    MEDICATIONS  (PRN):  acetaminophen   IVPB .. 1000 milliGRAM(s) IV Intermittent once PRN Mild Pain (1 - 3)  metoclopramide 10 milliGRAM(s) Oral every 4 hours PRN nausea  ondansetron Injectable 4 milliGRAM(s) IV Push every 4 hours PRN Nausea and/or Vomiting  zolpidem 5 milliGRAM(s) Oral at bedtime PRN Insomnia  zolpidem 5 milliGRAM(s) Oral at bedtime PRN Insomnia    Pertinent Physical Exam  I&O's Summary    14 Mar 2022 07:01  -  15 Mar 2022 07:00  --------------------------------------------------------  IN: 930 mL / OUT: 0 mL / NET: 930 mL      Marital Status:  ( X )    (   ) Single    (   )    (  )   Lives with: (  ) alone  (  ) children   ( X ) spouse   (  ) parents  (  ) other  Recent Travel: No recent travel  Occupation:    Substance Use (street drugs): ( x ) never used  (  ) other:  Tobacco Usage:  ( x  ) never smoked   (   ) former smoker   (   ) current smoker  (     ) pack year  Alcohol Usage: None

## 2022-03-19 NOTE — PROGRESS NOTE ADULT - REASON FOR ADMISSION
hiatal hernia repair with possible thoracotomy

## 2022-03-20 ENCOUNTER — TRANSCRIPTION ENCOUNTER (OUTPATIENT)
Age: 57
End: 2022-03-20

## 2022-03-20 VITALS
DIASTOLIC BLOOD PRESSURE: 75 MMHG | TEMPERATURE: 99 F | OXYGEN SATURATION: 100 % | HEART RATE: 71 BPM | RESPIRATION RATE: 17 BRPM | SYSTOLIC BLOOD PRESSURE: 118 MMHG

## 2022-03-20 LAB
ANION GAP SERPL CALC-SCNC: 11 MMOL/L — SIGNIFICANT CHANGE UP (ref 7–14)
BUN SERPL-MCNC: 6 MG/DL — LOW (ref 7–23)
CA-I BLD-SCNC: 1.18 MMOL/L — SIGNIFICANT CHANGE UP (ref 1.15–1.29)
CALCIUM SERPL-MCNC: 9.2 MG/DL — SIGNIFICANT CHANGE UP (ref 8.4–10.5)
CHLORIDE SERPL-SCNC: 105 MMOL/L — SIGNIFICANT CHANGE UP (ref 98–107)
CO2 SERPL-SCNC: 24 MMOL/L — SIGNIFICANT CHANGE UP (ref 22–31)
CREAT SERPL-MCNC: 1.09 MG/DL — SIGNIFICANT CHANGE UP (ref 0.5–1.3)
EGFR: 80 ML/MIN/1.73M2 — SIGNIFICANT CHANGE UP
GLUCOSE SERPL-MCNC: 106 MG/DL — HIGH (ref 70–99)
HCT VFR BLD CALC: 38.1 % — LOW (ref 39–50)
HGB BLD-MCNC: 12.8 G/DL — LOW (ref 13–17)
MAGNESIUM SERPL-MCNC: 2 MG/DL — SIGNIFICANT CHANGE UP (ref 1.6–2.6)
MCHC RBC-ENTMCNC: 30 PG — SIGNIFICANT CHANGE UP (ref 27–34)
MCHC RBC-ENTMCNC: 33.6 GM/DL — SIGNIFICANT CHANGE UP (ref 32–36)
MCV RBC AUTO: 89.4 FL — SIGNIFICANT CHANGE UP (ref 80–100)
NRBC # BLD: 0 /100 WBCS — SIGNIFICANT CHANGE UP
NRBC # FLD: 0 K/UL — SIGNIFICANT CHANGE UP
PHOSPHATE SERPL-MCNC: 3 MG/DL — SIGNIFICANT CHANGE UP (ref 2.5–4.5)
PLATELET # BLD AUTO: 199 K/UL — SIGNIFICANT CHANGE UP (ref 150–400)
POTASSIUM SERPL-MCNC: 3.9 MMOL/L — SIGNIFICANT CHANGE UP (ref 3.5–5.3)
POTASSIUM SERPL-SCNC: 3.9 MMOL/L — SIGNIFICANT CHANGE UP (ref 3.5–5.3)
RBC # BLD: 4.26 M/UL — SIGNIFICANT CHANGE UP (ref 4.2–5.8)
RBC # FLD: 13.8 % — SIGNIFICANT CHANGE UP (ref 10.3–14.5)
SODIUM SERPL-SCNC: 140 MMOL/L — SIGNIFICANT CHANGE UP (ref 135–145)
WBC # BLD: 5.54 K/UL — SIGNIFICANT CHANGE UP (ref 3.8–10.5)
WBC # FLD AUTO: 5.54 K/UL — SIGNIFICANT CHANGE UP (ref 3.8–10.5)

## 2022-03-20 PROCEDURE — 99238 HOSP IP/OBS DSCHRG MGMT 30/<: CPT

## 2022-03-20 RX ORDER — ZOLPIDEM TARTRATE 10 MG/1
12.5 TABLET ORAL
Qty: 0 | Refills: 0 | DISCHARGE

## 2022-03-20 RX ORDER — NIFEDIPINE 30 MG
1 TABLET, EXTENDED RELEASE 24 HR ORAL
Qty: 30 | Refills: 0
Start: 2022-03-20 | End: 2022-04-18

## 2022-03-20 RX ORDER — BETHANECHOL CHLORIDE 25 MG
1 TABLET ORAL
Qty: 90 | Refills: 0
Start: 2022-03-20 | End: 2022-04-18

## 2022-03-20 RX ORDER — ONDANSETRON 8 MG/1
1 TABLET, FILM COATED ORAL
Qty: 21 | Refills: 0
Start: 2022-03-20 | End: 2022-03-26

## 2022-03-20 RX ORDER — ZOLPIDEM TARTRATE 10 MG/1
1 TABLET ORAL
Qty: 5 | Refills: 0
Start: 2022-03-20 | End: 2022-03-24

## 2022-03-20 RX ORDER — SIMETHICONE 80 MG/1
80 TABLET, CHEWABLE ORAL ONCE
Refills: 0 | Status: COMPLETED | OUTPATIENT
Start: 2022-03-20 | End: 2022-03-20

## 2022-03-20 RX ADMIN — Medication 5 MILLIGRAM(S): at 12:36

## 2022-03-20 RX ADMIN — Medication 30 MILLIGRAM(S): at 05:07

## 2022-03-20 RX ADMIN — Medication 50 MILLIGRAM(S): at 05:08

## 2022-03-20 RX ADMIN — PANTOPRAZOLE SODIUM 40 MILLIGRAM(S): 20 TABLET, DELAYED RELEASE ORAL at 05:08

## 2022-03-20 RX ADMIN — BUDESONIDE AND FORMOTEROL FUMARATE DIHYDRATE 2 PUFF(S): 160; 4.5 AEROSOL RESPIRATORY (INHALATION) at 08:45

## 2022-03-20 RX ADMIN — TIOTROPIUM BROMIDE 1 CAPSULE(S): 18 CAPSULE ORAL; RESPIRATORY (INHALATION) at 10:13

## 2022-03-20 RX ADMIN — Medication 5 MILLIGRAM(S): at 08:45

## 2022-03-20 RX ADMIN — APIXABAN 2.5 MILLIGRAM(S): 2.5 TABLET, FILM COATED ORAL at 05:07

## 2022-03-20 RX ADMIN — SIMETHICONE 80 MILLIGRAM(S): 80 TABLET, CHEWABLE ORAL at 16:58

## 2022-03-20 NOTE — DISCHARGE NOTE NURSING/CASE MANAGEMENT/SOCIAL WORK - NSDCPEFALRISK_GEN_ALL_CORE
For information on Fall & Injury Prevention, visit: [email or web address] OR  [email or web address] OR  https://www.cdc.gov/steadi/patient.html

## 2022-03-20 NOTE — DISCHARGE NOTE NURSING/CASE MANAGEMENT/SOCIAL WORK - NSDCPETBCESMAN_GEN_ALL_CORE
Problem: PHYSICAL THERAPY ADULT  Goal: Performs mobility at highest level of function for planned discharge setting  See evaluation for individualized goals  Outcome: Progressing  Note:   Prognosis: Fair  Problem List: Decreased strength, Decreased endurance, Impaired balance, Decreased mobility, Impaired judgement, Decreased cognition, Decreased safety awareness  Assessment: Pt  Currently performing transfers (mod ) x 2 level of function  Utilizing RW with attempt to stand  Pt  With decreased ability to follow commands and attention to task posing a significant safety concern  Transfer training to increase functional task performance and  to promote safe sit/stand and stand/sit mobility  Pt is in need of continued activity in PT to improve strength balance endurance mobility transfers and ambulation with return to maximize LOF  From PT/mobility standpoint, recommendation at time of d/c would be STR  in order to promote return to PLOF and independence  Recommendation: Home PT, Home with family support         See flowsheet documentation for full assessment  If you are a smoker, it is important for your health to stop smoking. Please be aware that second hand smoke is also harmful.

## 2022-03-20 NOTE — DISCHARGE NOTE NURSING/CASE MANAGEMENT/SOCIAL WORK - NSDCFUADDAPPT_GEN_ALL_CORE_FT
Follow up with Dr Dillon Javier in 1-2 weeks. Call for an apt.   See GI Dr. Lindo in 1-2 weeks. Call for an apt.

## 2022-03-20 NOTE — DISCHARGE NOTE NURSING/CASE MANAGEMENT/SOCIAL WORK - PATIENT PORTAL LINK FT
You can access the FollowMyHealth Patient Portal offered by [facility] by registering at the following website: [email or web address]. By joining [email or web address]’s FollowMyHealth portal, you will also be able to view your health information using other applications (apps) compatible with our system.

## 2022-03-21 ENCOUNTER — TRANSCRIPTION ENCOUNTER (OUTPATIENT)
Age: 57
End: 2022-03-21

## 2022-03-29 PROBLEM — K44.9 ESOPHAGEAL HIATUS HERNIA: Status: ACTIVE | Noted: 2021-03-02

## 2022-03-31 ENCOUNTER — APPOINTMENT (OUTPATIENT)
Dept: THORACIC SURGERY | Facility: CLINIC | Age: 57
End: 2022-03-31
Payer: COMMERCIAL

## 2022-03-31 VITALS
HEART RATE: 78 BPM | RESPIRATION RATE: 17 BRPM | OXYGEN SATURATION: 98 % | DIASTOLIC BLOOD PRESSURE: 78 MMHG | BODY MASS INDEX: 24.65 KG/M2 | HEIGHT: 72 IN | SYSTOLIC BLOOD PRESSURE: 108 MMHG | WEIGHT: 182 LBS

## 2022-03-31 DIAGNOSIS — K44.9 DIAPHRAGMATIC HERNIA W/OUT OBSTRUCTION OR GANGRENE: ICD-10-CM

## 2022-03-31 LAB — SURGICAL PATHOLOGY STUDY: SIGNIFICANT CHANGE UP

## 2022-03-31 PROCEDURE — 99214 OFFICE O/P EST MOD 30 MIN: CPT

## 2022-03-31 NOTE — PHYSICAL EXAM
[] : no respiratory distress [Respiration, Rhythm And Depth] : normal respiratory rhythm and effort [Exaggerated Use Of Accessory Muscles For Inspiration] : no accessory muscle use [Auscultation Breath Sounds / Voice Sounds] : lungs were clear to auscultation bilaterally [Examination Of The Chest] : the chest was normal in appearance [Chest Visual Inspection Thoracic Asymmetry] : no chest asymmetry [Diminished Respiratory Excursion] : normal chest expansion [2+] : left 2+ [Bowel Sounds] : normal bowel sounds [Abdomen Soft] : soft [Abdomen Tenderness] : non-tender [Cervical Lymph Nodes Enlarged Posterior Bilaterally] : posterior cervical [Cervical Lymph Nodes Enlarged Anterior Bilaterally] : anterior cervical [Supraclavicular Lymph Nodes Enlarged Bilaterally] : supraclavicular [Involuntary Movements] : no involuntary movements were seen [Skin Color & Pigmentation] : normal skin color and pigmentation [Oriented To Time, Place, And Person] : oriented to person, place, and time [No Focal Deficits] : no focal deficits

## 2022-04-04 NOTE — CONSULT LETTER
[FreeTextEntry2] : Dr. Angel Lindo (GI/Referring)\par Dr. Lucero Del Angel (PCP) \par  [FreeTextEntry3] : Dillon Javier MD, MPH \par System Director of Thoracic Surgery \par Director of Comprehensive [facility] [place] \par Professor Cardiovascular & Thoracic Surgery  \par [employer] [facility]\par \par [facility]\par [address]\par Oncology [address]\[place] [address]\par Tel: [phone]\par Fax: [phone]\par

## 2022-04-04 NOTE — ASSESSMENT
[FreeTextEntry1] : Mr. 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 Cruz Herrera, s/p G-POEM in 2019, b/l PE on Eliquis, depression, anemia, DM (not on medications), who referred by Dr. Angel Lindo for evaluation of recurrent hiatal hernia. \par \par Patient is s/p hiatal hernia repair, Nissen fundoplication in 2011, and takedown of Nissen fundoplication, repair of recurrent hiatal hernia, revision fundoplication to partial Toupet fundoplication on 01/19/2016 by Dr. Edgard Whaley (General surgeon) at [place]. \par \par Now s/p 11 months  EGD, Robotic-assisted, Re-do Laparoscopy, extensive lysis of adhesions, repair of recurrent hiatal hernia repair on 4/12/2021.\par \par Patient presented to ED c/o pain while eating, CT Chest /Abdomen showed no evidence of recurrent disease and no other pathology. Barium esophagram showed easy flow of contrast w/o outflow obstruction, w/o leak, and w/o recurrence.\par - EGD w/ biopsy on 3/16/22 showed intact Toupet fundoplication; no evidence of recurrent hernia. Path of esophagus at 42cm: Active esophagitis with features of reflux esophagitis; Cardiafundic-type glandular mucosa with chronic active inflammation; Negative for Helicobacter microorganisms (IHC); Negative for intestinal metaplasia; Negative for fungal organisms (GMS)\par - Upper GI endoscopy on 3/18 (Dr. Lissette Fernandez): Dilation in the entire esophagus; Gastroesophageal flap valve classified as Hill Grade I; Z-line, 41 from incisors. EndoFLIP demonstrates absent RAC patter with  maximal EFJ-DI of 2.68 with maximal esophageal diameter of 11.8\par \par I have reviewed the patient's medical records and diagnostic images at time of this office consultation and have made the following recommendation:\par 1. EGD results discussed with patient. Symptoms resolving. Recommendation to return to clinic in 6 months with repeat Barium esophagram. \par 2. Follow up with Dr. Lissette Fernandez as per her recommendations\par \par 	\par Recommendations reviewed with patient during this office visit, and all questions answered; Patient instructed on the importance of follow up and verbalizes understanding.\par \par I personally performed the services described in the documentation, reviewed the documentation recorded by the scribe in my presence and it accurately and completely records my words and actions.\par \par I, YIMI Dos Santos-C, am scribing for and the presence of JACEK May, the following sections HISTORY OF PRESENT ILLNESS, PAST MEDICAL/FAMILY/SOCIAL HISTORY; REVIEW OF SYSTEMS; VITAL SIGNS; PHYSICAL EXAM; DISPOSITION.\par \par \par \par

## 2022-04-04 NOTE — HISTORY OF PRESENT ILLNESS
[FreeTextEntry1] : Mr. 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 Cruz Herrera, s/p G-POEM in 2019, b/l PE on Eliquis, depression, anemia, DM (not on medications), who referred by Dr. Angel Lindo for evaluation of recurrent hiatal hernia. \par \par Patient is s/p hiatal hernia repair, Nissen fundoplication in 2011, and takedown of Nissen fundoplication, repair of recurrent hiatal hernia, revision fundoplication to partial Toupet fundoplication on 01/19/2016 by Dr. Edgard Whaley (General surgeon) at [place]. \par \par EGD on 1/4/21 by Dr. Lindo. Path of gastric antrum showed chronic inactive gastritis, negative H. Pylori. Gastric proximal biopsy showed chronic inactive gastritis, extensive intestinal and Paneth cell metaplasia, negative H. Pylori.\par \par Gastric emptying study on 1/26/21. Normal gastric emptying of liquids and solids; no evidence of gastroparesis.\par \par Barium Esophagram on 2/9/21 showed GE reflux and a small hiatal hernia. \par \par CT Chest on 3/11/21:\par - s/p Nissen fundoplication\par - small, 3.6 cm hiatal hernia\par \par Manometry on 4/9/21 by Dr. Lissette Fernandez. LES = 55.1 (13-43), UES = 95.2 (); 50% swallows w/ normal amplitude peristalsis, 20% swallows w/ low amplitude peristalsis, 30% w/ distal esophageal spasm. 80% swallows w/ incomplete bolus clearance by impedence analysis. No Hiatal Hernia. Study is c/w distal esophageal spasm.\par \par Now s/p 11 months  EGD, Robotic-assisted, Re-do Laparoscopy, extensive lysis of adhesions, repair of recurrent hiatal hernia repair on 4/12/2021.\par \par Patient presented to ED on 5/16/21 c/o severe pain and N/V, managed with morphine. CT A/P w/ IV contrast on 5/16/21 showed no acute intra-abdominal process.\par \par Patient presented to ED c/o pain while eating, CT Chest /Abdomen showed no evidence of recurrent disease and no other pathology. Barium esophagram showed easy flow of contrast w/o outflow obstruction, w/o leak, and w/o recurrence.\par - EGD w/ biopsy on 3/16/22 showed intact Toupet fundoplication; no evidence of recurrent hernia. Path of esophagus at 42cm: Active esophagitis with features of reflux esophagitis; Cardiafundic-type glandular mucosa with chronic active inflammation; Negative for Helicobacter microorganisms (IHC); Negative for intestinal metaplasia; Negative for fungal organisms (GMS)\par - Upper GI endoscopy on 3/18 (Dr. Lissette Fernandez): Dilation in the entire esophagus; Gastroesophageal flap valve classified as Hill Grade I; Z-line, 41 from incisors. EndoFLIP demonstrates absent RAC patter with  maximal EFJ-DI of 2.68 with maximal esophageal diameter of 11.8\par \par Patient is here today for a follow up. Today, patient endorses that he's tolerating food well. Weight stable. 1 episode of nausea within the past few weeks. Symptoms controlled with Gas-X and Bethanechol. Denies worsening SOB, chest pain, cough, hemoptysis, fever, chills, night sweats, lightheadedness or dizziness.\par

## 2022-04-04 NOTE — DATA REVIEWED
[FreeTextEntry1] : I have independently reviewed the following:\par CT Chest /Abdomen \par Barium esophagram\par EGD w/ biopsy on 3/16/22

## 2022-05-02 ENCOUNTER — NON-APPOINTMENT (OUTPATIENT)
Age: 57
End: 2022-05-02

## 2022-05-02 ENCOUNTER — APPOINTMENT (OUTPATIENT)
Dept: GASTROENTEROLOGY | Facility: CLINIC | Age: 57
End: 2022-05-02
Payer: COMMERCIAL

## 2022-05-02 VITALS
OXYGEN SATURATION: 97 % | DIASTOLIC BLOOD PRESSURE: 58 MMHG | HEART RATE: 59 BPM | BODY MASS INDEX: 25.06 KG/M2 | SYSTOLIC BLOOD PRESSURE: 112 MMHG | WEIGHT: 185 LBS | HEIGHT: 72 IN

## 2022-05-02 DIAGNOSIS — Z12.11 ENCOUNTER FOR SCREENING FOR MALIGNANT NEOPLASM OF COLON: ICD-10-CM

## 2022-05-02 PROCEDURE — 99215 OFFICE O/P EST HI 40 MIN: CPT

## 2022-05-02 PROCEDURE — 99205 OFFICE O/P NEW HI 60 MIN: CPT

## 2022-05-02 RX ORDER — POLYETHYLENE GLYCOL 3350 AND ELECTROLYTES WITH LEMON FLAVOR 236; 22.74; 6.74; 5.86; 2.97 G/4L; G/4L; G/4L; G/4L; G/4L
236 POWDER, FOR SOLUTION ORAL
Qty: 1 | Refills: 0 | Status: ACTIVE | COMMUNITY
Start: 2022-05-02 | End: 1900-01-01

## 2022-05-19 ENCOUNTER — APPOINTMENT (OUTPATIENT)
Dept: GASTROENTEROLOGY | Facility: CLINIC | Age: 57
End: 2022-05-19

## 2022-05-31 ENCOUNTER — RX RENEWAL (OUTPATIENT)
Age: 57
End: 2022-05-31

## 2022-06-03 ENCOUNTER — RX RENEWAL (OUTPATIENT)
Age: 57
End: 2022-06-03

## 2022-06-14 ENCOUNTER — OUTPATIENT (OUTPATIENT)
Dept: OUTPATIENT SERVICES | Facility: HOSPITAL | Age: 57
LOS: 1 days | Discharge: HOME | End: 2022-06-14
Payer: COMMERCIAL

## 2022-06-14 ENCOUNTER — APPOINTMENT (OUTPATIENT)
Dept: GASTROENTEROLOGY | Facility: HOSPITAL | Age: 57
End: 2022-06-14

## 2022-06-14 VITALS
HEART RATE: 64 BPM | OXYGEN SATURATION: 98 % | SYSTOLIC BLOOD PRESSURE: 135 MMHG | RESPIRATION RATE: 18 BRPM | DIASTOLIC BLOOD PRESSURE: 76 MMHG | TEMPERATURE: 98 F

## 2022-06-14 VITALS
HEART RATE: 75 BPM | RESPIRATION RATE: 19 BRPM | DIASTOLIC BLOOD PRESSURE: 88 MMHG | SYSTOLIC BLOOD PRESSURE: 137 MMHG | OXYGEN SATURATION: 97 %

## 2022-06-14 DIAGNOSIS — Z98.890 OTHER SPECIFIED POSTPROCEDURAL STATES: Chronic | ICD-10-CM

## 2022-06-14 DIAGNOSIS — Z90.89 ACQUIRED ABSENCE OF OTHER ORGANS: Chronic | ICD-10-CM

## 2022-06-14 DIAGNOSIS — Z87.19 PERSONAL HISTORY OF OTHER DISEASES OF THE DIGESTIVE SYSTEM: Chronic | ICD-10-CM

## 2022-06-14 DIAGNOSIS — Z96.89 PRESENCE OF OTHER SPECIFIED FUNCTIONAL IMPLANTS: Chronic | ICD-10-CM

## 2022-06-14 DIAGNOSIS — K59.09 OTHER CONSTIPATION: ICD-10-CM

## 2022-06-14 LAB — GLUCOSE BLDC GLUCOMTR-MCNC: 100 MG/DL — HIGH (ref 70–99)

## 2022-06-14 PROCEDURE — 45378 DIAGNOSTIC COLONOSCOPY: CPT

## 2022-06-14 NOTE — ASU PATIENT PROFILE, ADULT - FALL HARM RISK - UNIVERSAL INTERVENTIONS
Bed in lowest position, wheels locked, appropriate side rails in place/Call bell, personal items and telephone in reach/Instruct patient to call for assistance before getting out of bed or chair/Non-slip footwear when patient is out of bed/[place] to call system/Physically safe environment - no spills, clutter or unnecessary equipment/Purposeful Proactive Rounding/Room/bathroom lighting operational, light cord in reach

## 2022-06-15 NOTE — H&P PST ADULT - PSH
8
H/O hiatal hernia  repaired  H/O submucous nasal surgery  septoplasty  S/P Achilles tendon repair  right - done 2x  S/P carpal tunnel release  right  S/P laparoscopic fundoplication  2x  S/P tonsillectomy and adenoidectomy

## 2022-06-16 LAB — SARS-COV-2 N GENE NPH QL NAA+PROBE: NOT DETECTED

## 2022-07-12 ENCOUNTER — NON-APPOINTMENT (OUTPATIENT)
Age: 57
End: 2022-07-12

## 2022-08-03 ENCOUNTER — NON-APPOINTMENT (OUTPATIENT)
Age: 57
End: 2022-08-03

## 2022-08-15 NOTE — ASU PATIENT PROFILE, ADULT - FALL HARM RISK TYPE OF ASSESSMENT
Daily Assessment Complex Repair And Skin Substitute Graft Text: The defect edges were debeveled with a #15 scalpel blade.  The primary defect was closed partially with a complex linear closure.  Given the location of the remaining defect, shape of the defect and the proximity to free margins a skin substitute graft was deemed most appropriate to repair the remaining defect.  The graft was trimmed to fit the size of the remaining defect.  The graft was then placed in the primary defect, oriented appropriately, and sutured into place.

## 2022-08-26 ENCOUNTER — RX RENEWAL (OUTPATIENT)
Age: 57
End: 2022-08-26

## 2022-11-20 NOTE — H&P PST ADULT - EXTREMITIES
36 yo F with hx of ADHD on Adderall and Duloxetine p/w self reported unintentional overdose of 8-10 x 20mg tablets of Adderall taken over the course of yesterday evening, approximate time span of 6-8 hours, while drinking, for combination of abuse and self attempt to treat stress.  Pt denies SI or HI.  No headache, chest pain, sob, n/v, weakness, changes to speech or gait.  No fever or chills. No cyanosis, clubbing or edema

## 2022-12-22 ENCOUNTER — RX RENEWAL (OUTPATIENT)
Age: 57
End: 2022-12-22

## 2023-01-23 ENCOUNTER — RX RENEWAL (OUTPATIENT)
Age: 58
End: 2023-01-23

## 2023-01-26 ENCOUNTER — APPOINTMENT (OUTPATIENT)
Dept: SURGERY | Facility: CLINIC | Age: 58
End: 2023-01-26

## 2023-02-07 ENCOUNTER — APPOINTMENT (OUTPATIENT)
Dept: SURGERY | Facility: CLINIC | Age: 58
End: 2023-02-07
Payer: COMMERCIAL

## 2023-02-07 VITALS
DIASTOLIC BLOOD PRESSURE: 72 MMHG | WEIGHT: 193 LBS | HEART RATE: 65 BPM | BODY MASS INDEX: 26.14 KG/M2 | OXYGEN SATURATION: 100 % | SYSTOLIC BLOOD PRESSURE: 112 MMHG | HEIGHT: 72 IN

## 2023-02-07 PROCEDURE — 99214 OFFICE O/P EST MOD 30 MIN: CPT

## 2023-02-10 ENCOUNTER — APPOINTMENT (OUTPATIENT)
Dept: SURGERY | Facility: CLINIC | Age: 58
End: 2023-02-10
Payer: COMMERCIAL

## 2023-02-10 PROCEDURE — 99213 OFFICE O/P EST LOW 20 MIN: CPT

## 2023-03-22 ENCOUNTER — NON-APPOINTMENT (OUTPATIENT)
Age: 58
End: 2023-03-22

## 2023-03-24 ENCOUNTER — NON-APPOINTMENT (OUTPATIENT)
Age: 58
End: 2023-03-24

## 2023-04-07 ENCOUNTER — NON-APPOINTMENT (OUTPATIENT)
Age: 58
End: 2023-04-07

## 2023-04-11 ENCOUNTER — NON-APPOINTMENT (OUTPATIENT)
Age: 58
End: 2023-04-11

## 2023-04-12 ENCOUNTER — NON-APPOINTMENT (OUTPATIENT)
Age: 58
End: 2023-04-12

## 2023-04-13 ENCOUNTER — NON-APPOINTMENT (OUTPATIENT)
Age: 58
End: 2023-04-13

## 2023-04-17 ENCOUNTER — APPOINTMENT (OUTPATIENT)
Dept: GASTROENTEROLOGY | Facility: CLINIC | Age: 58
End: 2023-04-17
Payer: COMMERCIAL

## 2023-04-17 VITALS
SYSTOLIC BLOOD PRESSURE: 120 MMHG | WEIGHT: 195 LBS | HEIGHT: 72 IN | OXYGEN SATURATION: 98 % | TEMPERATURE: 96.5 F | DIASTOLIC BLOOD PRESSURE: 74 MMHG | HEART RATE: 65 BPM | BODY MASS INDEX: 26.41 KG/M2

## 2023-04-17 DIAGNOSIS — K22.4 DYSKINESIA OF ESOPHAGUS: ICD-10-CM

## 2023-04-17 PROCEDURE — 99215 OFFICE O/P EST HI 40 MIN: CPT

## 2023-04-17 RX ORDER — LUBIPROSTONE 8 UG/1
8 CAPSULE ORAL
Qty: 60 | Refills: 0 | Status: DISCONTINUED | COMMUNITY
Start: 2023-04-11 | End: 2023-04-17

## 2023-04-17 RX ORDER — ONDANSETRON 4 MG/1
4 TABLET, ORALLY DISINTEGRATING ORAL EVERY 8 HOURS
Qty: 90 | Refills: 1 | Status: ACTIVE | COMMUNITY
Start: 2022-04-01 | End: 1900-01-01

## 2023-04-17 RX ORDER — OMEPRAZOLE 20 MG/1
TABLET, DELAYED RELEASE ORAL
Refills: 0 | Status: DISCONTINUED | COMMUNITY
End: 2023-04-17

## 2023-04-17 RX ORDER — LINACLOTIDE 145 UG/1
145 CAPSULE, GELATIN COATED ORAL
Refills: 0 | Status: DISCONTINUED | COMMUNITY
End: 2023-04-17

## 2023-04-23 NOTE — PRE-OP CHECKLIST - NOTHING BY MOUTH SINCE
16-Mar-2022 00:00 You can access the FollowMyHealth Patient Portal offered by [facility] by registering at the following website: [email or web address]. By joining [employer]’s FollowMyHealth portal, you will also be able to view your health information using other applications (apps) compatible with our system. 16-Mar-2022 22:00

## 2023-04-28 ENCOUNTER — OUTPATIENT (OUTPATIENT)
Dept: OUTPATIENT SERVICES | Facility: HOSPITAL | Age: 58
LOS: 1 days | Discharge: HOME | End: 2023-04-28
Payer: COMMERCIAL

## 2023-04-28 ENCOUNTER — RESULT REVIEW (OUTPATIENT)
Age: 58
End: 2023-04-28

## 2023-04-28 ENCOUNTER — APPOINTMENT (OUTPATIENT)
Dept: GASTROENTEROLOGY | Facility: HOSPITAL | Age: 58
End: 2023-04-28

## 2023-04-28 VITALS
OXYGEN SATURATION: 99 % | HEART RATE: 60 BPM | SYSTOLIC BLOOD PRESSURE: 133 MMHG | DIASTOLIC BLOOD PRESSURE: 80 MMHG | RESPIRATION RATE: 12 BRPM

## 2023-04-28 VITALS
HEART RATE: 65 BPM | OXYGEN SATURATION: 100 % | SYSTOLIC BLOOD PRESSURE: 154 MMHG | WEIGHT: 186.95 LBS | HEIGHT: 72 IN | DIASTOLIC BLOOD PRESSURE: 98 MMHG | TEMPERATURE: 97 F | RESPIRATION RATE: 16 BRPM

## 2023-04-28 DIAGNOSIS — Z98.890 OTHER SPECIFIED POSTPROCEDURAL STATES: Chronic | ICD-10-CM

## 2023-04-28 DIAGNOSIS — Z96.89 PRESENCE OF OTHER SPECIFIED FUNCTIONAL IMPLANTS: Chronic | ICD-10-CM

## 2023-04-28 DIAGNOSIS — R13.19 OTHER DYSPHAGIA: ICD-10-CM

## 2023-04-28 DIAGNOSIS — Z90.89 ACQUIRED ABSENCE OF OTHER ORGANS: Chronic | ICD-10-CM

## 2023-04-28 DIAGNOSIS — Z87.19 PERSONAL HISTORY OF OTHER DISEASES OF THE DIGESTIVE SYSTEM: Chronic | ICD-10-CM

## 2023-04-28 PROCEDURE — 43239 EGD BIOPSY SINGLE/MULTIPLE: CPT

## 2023-04-28 PROCEDURE — 88305 TISSUE EXAM BY PATHOLOGIST: CPT | Mod: 26

## 2023-04-28 PROCEDURE — 88312 SPECIAL STAINS GROUP 1: CPT | Mod: 26

## 2023-04-28 PROCEDURE — 91040 ESOPH BALLOON DISTENSION TST: CPT | Mod: 26

## 2023-04-28 DEVICE — CATH ENDOFLIP MEASURE 16MM: Type: IMPLANTABLE DEVICE | Status: FUNCTIONAL

## 2023-04-28 RX ORDER — SODIUM CHLORIDE 9 MG/ML
500 INJECTION, SOLUTION INTRAVENOUS
Refills: 0 | Status: COMPLETED | OUTPATIENT
Start: 2023-04-28 | End: 2023-04-28

## 2023-04-28 RX ADMIN — SODIUM CHLORIDE 30 MILLILITER(S): 9 INJECTION, SOLUTION INTRAVENOUS at 10:29

## 2023-04-28 NOTE — PRE PROCEDURE NOTE - PRE PROCEDURE EVALUATION
Pre-Endoscopy Evaluation    Referring Physician:                                  Rebecca    Indication for Procedure:  dysphagia    Sedation by Anesthesia [X ]    PAST MEDICAL & SURGICAL HISTORY:  HTN (hypertension)      Reactive airway disease      Asthma      GERD (gastroesophageal reflux disease)      Hiatal hernia      Diabetes mellitus      Anxiety  PTSD - [employer] responnder      Low testosterone in male      Osteoarthritis      Intervertebral disc disorder  bulging discs cervical and lumbar region      Shoulder pain, left      Depression      PE (pulmonary thromboembolism)      Insomnia      Gastroparesis      COVID-19 virus infection  h/o 4/2020      COVID-19 vaccine series completed      H/O submucous nasal surgery  septoplasty      S/P laparoscopic fundoplication  2x      S/P Achilles tendon repair  right - done 2x      S/P carpal tunnel release  right      S/P tonsillectomy and adenoidectomy      H/O hiatal hernia  repaired      Presence of gastric pacemaker  2018          Latex allergy: [ ] yes [X] no    Smoking: [ ] yes  [X] no    AICD/PPM: [ ] yes   [X] no    Pertinent lab data:                      Physical Examination:  Daily Height in cm: 182.88 (28 Apr 2023 10:18)    Daily   Vital Signs Last 24 Hrs  T(C): 36.2 (28 Apr 2023 11:33), Max: 36.2 (28 Apr 2023 10:18)  T(F): 97.2 (28 Apr 2023 11:33), Max: 97.2 (28 Apr 2023 11:33)  HR: 60 (28 Apr 2023 12:03) (60 - 68)  BP: 133/80 (28 Apr 2023 12:03) (124/73 - 154/98)  BP(mean): --  RR: 12 (28 Apr 2023 12:03) (12 - 17)  SpO2: 99% (28 Apr 2023 12:03) (98% - 100%)    Parameters below as of 28 Apr 2023 12:03  Patient On (Oxygen Delivery Method): room air        Constitutional: NAD    HEENT: PERRLA, EOMI,       Neck:  No JVD    Respiratory: CTAB/L    Cardiovascular: S1 and S2    Gastrointestinal: BS+, soft, NT/ND    Extremities: No peripheral edema    Neurological: A/O x 3, no focal deficits    Psychiatric: Normal mood, normal affect    : No Jiménez    Skin: No rashes    Comments:    ASA Class: I [ ]  II [ ]  III [X ]  IV [ ]    The patient is a suitable candidate for the planned procedure unless box checked [ ]  No, explain:

## 2023-04-28 NOTE — ASU PREOP CHECKLIST - NSWEIGHTCALCTOOLDRUG_GEN_A_CORE
"AUDIOLOGY REPORT    SUBJECTIVE: Amos Casey, 3 year old male, was seen in the [facility] on 2020 for a pediatric hearing evaluation, referred by Cristobal Goldman MD, for concerns regarding a clinically or educationally significant hearing loss. Amos was accompanied by his mother.     Per parental report, pregnancy and delivery were unremarkable. Amos was born full term and passed his  hearing screening bilaterally. Amos is currently in good health. Mom reports that for the last 6 months, Amos has been saying \"what\" more often and frequently asks her to repeat herself. Amos has recently begun [place] immersion , and the teacher has also noticed the same issue. Last week, the pediatrician removed a significant amount of cerumen from both of Amos's ears and immediately he seemed to hear better. Still want to be sure there is no underlying hearing loss.     [facility] Risk Factors  Family history of childhood hearing loss- No  Concern regarding hearing, speech or language- Yes- hearing  NICU stay- No  Hyperbilirubinemia- No  ECMO- No  Ventilation- No  Loop diuretic- No  Ototoxic medications- No  In utero infection- No  Congenital abnormality- No  Syndromes- No  Neurodegenerative disorders- No  Meningitis- No  Head trauma- No  Chemotherapy- No    OBJECTIVE:  Otoscopy revealed clear ear canals bilaterally. Tympanograms showed normal eardrum mobility on the right, and a flat tracing with normal volume on the left, which is indicative of middle ear dysfunction. Ipsilateral acoustic reflexes at 1kHz were present at normal levels on the right and absent on the left. Good reliability was obtained with two-lemuel conditioned play audiometry using circumaural headphones. Results were obtained at 500 and 4000 Hz in the right ear and revealed normal hearing thresholds. No additional frequencies were tested in the right ear so that we could focus on getting more " information in the left ear. Results for the left ear show a mild conductive hearing loss. Speech recognition thresholds were obtained at 15 dB HL on the right and 35 dB HL on the left by repeating spondee words.     ASSESSMENT: Today s results indicate normal hearing in the right ear and a mild conductive hearing loss in the left ear with a flat tympanogram. Today s results were discussed with Amos and his mother in detail.     PLAN: It is recommended that Amos follow up with his pediatrician regarding middle ear dysfunction on the left side. Return for repeat testing when ears are clear to be sure conductive component resolves. Please call this clinic at [phone] with questions regarding these results or recommendations.    Nancy Saavedra  Licensed Audiologist  [place] #[id number]    CC: Parents  CC: [facility]    used

## 2023-05-02 LAB — SURGICAL PATHOLOGY STUDY: SIGNIFICANT CHANGE UP

## 2023-05-22 ENCOUNTER — RX RENEWAL (OUTPATIENT)
Age: 58
End: 2023-05-22

## 2023-06-16 ENCOUNTER — APPOINTMENT (OUTPATIENT)
Dept: RADIOLOGY | Facility: HOSPITAL | Age: 58
End: 2023-06-16

## 2023-07-03 ENCOUNTER — NON-APPOINTMENT (OUTPATIENT)
Age: 58
End: 2023-07-03

## 2023-07-07 ENCOUNTER — RX RENEWAL (OUTPATIENT)
Age: 58
End: 2023-07-07

## 2023-07-10 ENCOUNTER — NON-APPOINTMENT (OUTPATIENT)
Age: 58
End: 2023-07-10

## 2023-07-11 ENCOUNTER — NON-APPOINTMENT (OUTPATIENT)
Age: 58
End: 2023-07-11

## 2023-07-17 ENCOUNTER — NON-APPOINTMENT (OUTPATIENT)
Age: 58
End: 2023-07-17

## 2023-07-25 RX ORDER — SODIUM CHLORIDE 9 MG/ML
500 INJECTION, SOLUTION INTRAVENOUS
Refills: 0 | Status: DISCONTINUED | OUTPATIENT
Start: 2023-07-28 | End: 2023-08-11

## 2023-07-28 ENCOUNTER — APPOINTMENT (OUTPATIENT)
Dept: GASTROENTEROLOGY | Facility: HOSPITAL | Age: 58
End: 2023-07-28

## 2023-07-28 ENCOUNTER — OUTPATIENT (OUTPATIENT)
Dept: OUTPATIENT SERVICES | Facility: HOSPITAL | Age: 58
LOS: 1 days | Discharge: HOME | End: 2023-07-28
Payer: COMMERCIAL

## 2023-07-28 ENCOUNTER — RESULT REVIEW (OUTPATIENT)
Age: 58
End: 2023-07-28

## 2023-07-28 VITALS
SYSTOLIC BLOOD PRESSURE: 130 MMHG | OXYGEN SATURATION: 100 % | RESPIRATION RATE: 18 BRPM | HEART RATE: 72 BPM | DIASTOLIC BLOOD PRESSURE: 100 MMHG

## 2023-07-28 VITALS
DIASTOLIC BLOOD PRESSURE: 80 MMHG | SYSTOLIC BLOOD PRESSURE: 145 MMHG | HEART RATE: 66 BPM | OXYGEN SATURATION: 100 % | RESPIRATION RATE: 18 BRPM | TEMPERATURE: 98 F | WEIGHT: 184.97 LBS

## 2023-07-28 DIAGNOSIS — Z98.890 OTHER SPECIFIED POSTPROCEDURAL STATES: Chronic | ICD-10-CM

## 2023-07-28 DIAGNOSIS — Z96.89 PRESENCE OF OTHER SPECIFIED FUNCTIONAL IMPLANTS: Chronic | ICD-10-CM

## 2023-07-28 DIAGNOSIS — Z90.89 ACQUIRED ABSENCE OF OTHER ORGANS: Chronic | ICD-10-CM

## 2023-07-28 DIAGNOSIS — Z87.19 PERSONAL HISTORY OF OTHER DISEASES OF THE DIGESTIVE SYSTEM: Chronic | ICD-10-CM

## 2023-07-28 DIAGNOSIS — B37.81 CANDIDAL ESOPHAGITIS: ICD-10-CM

## 2023-07-28 LAB
GLUCOSE BLDC GLUCOMTR-MCNC: 132 MG/DL — HIGH (ref 70–99)
GLUCOSE BLDC GLUCOMTR-MCNC: 138 MG/DL — HIGH (ref 70–99)

## 2023-07-28 PROCEDURE — 43239 EGD BIOPSY SINGLE/MULTIPLE: CPT

## 2023-07-28 PROCEDURE — 88305 TISSUE EXAM BY PATHOLOGIST: CPT | Mod: 26

## 2023-07-28 NOTE — ASU PATIENT PROFILE, ADULT - FALL HARM RISK - UNIVERSAL INTERVENTIONS
Bed in lowest position, wheels locked, appropriate side rails in place/Call bell, personal items and telephone in reach/Instruct patient to call for assistance before getting out of bed or chair/Non-slip footwear when patient is out of bed/[place] to call system/Purposeful Proactive Rounding/Room/bathroom lighting operational, light cord in reach

## 2023-07-28 NOTE — ASU PATIENT PROFILE, ADULT - PAIN CHRONIC, PROFILE
Pt called to schedule annual and hormone cons. Pt wants to schedule hormone consult first. Advise pt she will need to fill out questionnaire first. Will have Arzia give pt a call   no

## 2023-07-28 NOTE — PRE PROCEDURE NOTE - PRE PROCEDURE EVALUATION
Anesthesiology  Post-op Note    Post-op day 1 s/p  via spinal with neuraxial opioids for post-op pain management. /64   Pulse 77   Temp 98.1 °F (36.7 °C) (Oral)   Resp 16   Ht 5' 7\" (1.702 m)   Wt 183 lb (83 kg)   SpO2 100%   BMI 28.66 kg/m²   Airway patent, patient appropriately hydrated and appears euvolemic. Patient is Somnolent but arousable . Pain is well controlled. Pruritus is moderately controlled. Nausea is absent. No complaints about back or site of injection. Motor and sensory function has returned to baseline in lower extremities. Patient is satisfied with anesthetic and reports no complications. Continue current orders, then initiate surgeon's orders for pain management 24 hours after . Follow up per surgeon.                                                                                               Pre-Endoscopy Evaluation    Referring Physician:                                  Rebecca    Indication for Procedure:  dysphagia    Sedation by Anesthesia [X ]    PAST MEDICAL & SURGICAL HISTORY:  HTN (hypertension)      Reactive airway disease      Asthma      GERD (gastroesophageal reflux disease)      Hiatal hernia      Diabetes mellitus      Anxiety  PTSD - [employer] responnder      Low testosterone in male      Osteoarthritis      Intervertebral disc disorder  bulging discs cervical and lumbar region      Shoulder pain, left      Depression      PE (pulmonary thromboembolism)      Insomnia      Gastroparesis      COVID-19 virus infection  h/o 4/2020      COVID-19 vaccine series completed      H/O submucous nasal surgery  septoplasty      S/P laparoscopic fundoplication  2x      S/P Achilles tendon repair  right - done 2x      S/P carpal tunnel release  right      S/P tonsillectomy and adenoidectomy      H/O hiatal hernia  repaired      Presence of gastric pacemaker  2018          Latex allergy: [ ] yes [X] no    Smoking: [ ] yes  [X] no    AICD/PPM: [ ] yes   [X] no    Pertinent lab data:                      Physical Examination:  Daily     Daily   Vital Signs Last 24 Hrs  T(C): 36.7 (28 Jul 2023 07:58), Max: 36.7 (28 Jul 2023 07:58)  T(F): 98.1 (28 Jul 2023 07:58), Max: 98.1 (28 Jul 2023 07:58)  HR: 66 (28 Jul 2023 07:58) (66 - 66)  BP: 145/80 (28 Jul 2023 07:58) (145/80 - 145/80)  BP(mean): --  RR: 18 (28 Jul 2023 07:58) (18 - 18)  SpO2: 100% (28 Jul 2023 07:58) (100% - 100%)    Parameters below as of 28 Jul 2023 07:58  Patient On (Oxygen Delivery Method): room air        Constitutional: NAD    HEENT: PERRLA, EOMI,       Neck:  No JVD    Respiratory: CTAB/L    Cardiovascular: S1 and S2    Gastrointestinal: BS+, soft, NT/ND    Extremities: No peripheral edema    Neurological: A/O x 3, no focal deficits    Psychiatric: Normal mood, normal affect    : No Jiménez    Skin: No rashes    Comments:    ASA Class: I [ ]  II [ X]  III [ ]  IV [ ]    The patient is a suitable candidate for the planned procedure unless box checked [ ]  No, explain:

## 2023-07-31 LAB — SURGICAL PATHOLOGY STUDY: SIGNIFICANT CHANGE UP

## 2023-08-03 ENCOUNTER — RX RENEWAL (OUTPATIENT)
Age: 58
End: 2023-08-03

## 2023-08-03 RX ORDER — OMEPRAZOLE 40 MG/1
40 CAPSULE, DELAYED RELEASE ORAL
Qty: 30 | Refills: 0 | Status: ACTIVE | COMMUNITY
Start: 2022-05-02 | End: 1900-01-01

## 2023-08-10 ENCOUNTER — APPOINTMENT (OUTPATIENT)
Dept: THORACIC SURGERY | Facility: CLINIC | Age: 58
End: 2023-08-10
Payer: COMMERCIAL

## 2023-08-10 VITALS
OXYGEN SATURATION: 97 % | BODY MASS INDEX: 24.92 KG/M2 | RESPIRATION RATE: 16 BRPM | HEART RATE: 63 BPM | HEIGHT: 72 IN | SYSTOLIC BLOOD PRESSURE: 146 MMHG | DIASTOLIC BLOOD PRESSURE: 87 MMHG | WEIGHT: 184 LBS

## 2023-08-10 DIAGNOSIS — K21.9 DIAPHRAGMATIC HERNIA W/OUT OBSTRUCTION OR GANGRENE: ICD-10-CM

## 2023-08-10 DIAGNOSIS — K44.9 DIAPHRAGMATIC HERNIA W/OUT OBSTRUCTION OR GANGRENE: ICD-10-CM

## 2023-08-10 PROCEDURE — 99214 OFFICE O/P EST MOD 30 MIN: CPT

## 2023-08-10 RX ORDER — ZOLPIDEM TARTRATE 12.5 MG/1
12.5 TABLET, EXTENDED RELEASE ORAL
Qty: 30 | Refills: 0 | Status: COMPLETED | COMMUNITY
Start: 2017-11-10 | End: 2023-08-10

## 2023-08-10 NOTE — REVIEW OF SYSTEMS
[Recent Weight Loss (___ Lbs)] : recent [unfilled] ~Ulb weight loss [As Noted in HPI] : as noted in HPI [Abdominal Pain] : abdominal pain [Constipation] : constipation [Negative] : Heme/Lymph

## 2023-08-11 RX ORDER — CYANOCOBALAMIN 1000 UG/ML
1000 INJECTION INTRAMUSCULAR; SUBCUTANEOUS
Qty: 1 | Refills: 9 | Status: COMPLETED | COMMUNITY
Start: 2018-07-13 | End: 2023-08-11

## 2023-08-14 NOTE — ASSESSMENT
[FreeTextEntry1] : Mr. MARK GARCIA, 58 year old male, 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 Cruz Herrera, s/p G-POEM in 2019, b/l PE on Eliquis, depression, anemia, DM (not on medications), who referred by Dr. Angel Lindo for evaluation of recurrent hiatal hernia.   Patient is s/p hiatal hernia repair, Nissen fundoplication in 2011, and takedown of Nissen fundoplication, repair of recurrent hiatal hernia, revision fundoplication to partial Toupet fundoplication on 01/19/2016 by Dr. Edgard Whaley (General surgeon) at [place].   Now 2 yrs 4 mo s/p EGD, Robotic-assisted, Re-do Laparoscopy, extensive lysis of adhesions, repair of recurrent hiatal hernia repair on 4/12/2021.  S/p EGD biopsy on 7/31/23 by Dr. Lissette Fernandez. Path of esophagus showed mild reactive changes and mild chronic inflammation; negative for active and eosinophilic esophagitis; negative for fungi.  I have reviewed the patient's medical records and diagnostic images at time of this office consultation and have made the following recommendation: 1. EGD reviewed, I would like to perform my own EGD on 8/24/23 for further evaluation of the hernia. Risks and benefits and alternatives explained to patient, all questions answered, patient agreed to proceed with procedure.  2. CT Chest w/o contrast now  3. Barium esophagram 4. Hold Eliquis 3 days prior to procedure   I, JACEK May, personally performed the evaluation and management (E/M) services for this established patient who presents today with (a) new problem(s)/exacerbation of (an) existing condition(s).  That E/M includes conducting the examination, assessing all new/exacerbated conditions, and establishing a new plan of care.  Today, my ACP, Janny Garber NP was here to observe my evaluation and management services for this new problem/exacerbated condition to be followed going forward.

## 2023-08-14 NOTE — CONSULT LETTER
[Courtesy Letter:] : I had the pleasure of seeing your patient, [unfilled], in my office today. [Please see my note below.] : Please see my note below. [Consult Closing:] : Thank you very much for allowing me to participate in the care of this patient.  If you have any questions, please do not hesitate to contact me. [Sincerely,] : Sincerely, [DrManfred  ___] : Dr. ROCHE [FreeTextEntry2] : Dr. Angel Lindo (GI/Referring)\par  Dr. Lucero Del Angel (PCP) \par   [FreeTextEntry3] : Dillon Javier MD, MPH \par  System Director of Thoracic Surgery \par  Director of Comprehensive Lung and Foregut [place] \par  Professor Cardiovascular & Thoracic Surgery  \par  [facility] [employer] [facility]\par  \par  [facility]\par  [address]\par  Oncology [address]\[place] [address]\par  Tel: [phone]\par  Fax: [phone]\par

## 2023-08-14 NOTE — DATA REVIEWED
[FreeTextEntry1] : I have independently reviewed the following:  EGD biopsy on 7/31/23 by Dr. Lissette Fernandez.

## 2023-08-14 NOTE — HISTORY OF PRESENT ILLNESS
[FreeTextEntry1] : Mr. MARK GARCIA, 58 year old male, 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 Cruz Herrera, s/p G-POEM in 2019, b/l PE on Eliquis, depression, anemia, DM (not on medications), who referred by Dr. Angel Lindo for evaluation of recurrent hiatal hernia.   Patient is s/p hiatal hernia repair, Nissen fundoplication in 2011, and takedown of Nissen fundoplication, repair of recurrent hiatal hernia, revision fundoplication to partial Toupet fundoplication on 01/19/2016 by Dr. Edgard Whaley (General surgeon) at [place].   EGD on 1/4/21 by Dr. Lindo. Path of gastric antrum showed chronic inactive gastritis, negative H. Pylori. Gastric proximal biopsy showed chronic inactive gastritis, extensive intestinal and Paneth cell metaplasia, negative H. Pylori.  Gastric emptying study on 1/26/21. Normal gastric emptying of liquids and solids; no evidence of gastroparesis.  Barium Esophagram on 2/9/21 showed GE reflux and a small hiatal hernia.   CT Chest on 3/11/21: - s/p Nissen fundoplication - small, 3.6 cm hiatal hernia  Manometry on 4/9/21 by Dr. Lissette Fernandez. LES = 55.1 (13-43), UES = 95.2 (); 50% swallows w/ normal amplitude peristalsis, 20% swallows w/ low amplitude peristalsis, 30% w/ distal esophageal spasm. 80% swallows w/ incomplete bolus clearance by impedence analysis. No Hiatal Hernia. Study is c/w distal esophageal spasm.  Now 2 yrs 4 mo s/p EGD, Robotic-assisted, Re-do Laparoscopy, extensive lysis of adhesions, repair of recurrent hiatal hernia repair on 4/12/2021.  Patient presented to ED on 5/16/21 c/o severe pain and N/V, managed with morphine. CT A/P w/ IV contrast on 5/16/21 showed no acute intra-abdominal process.  Patient presented to ED c/o pain while eating, CT Chest /Abdomen showed no evidence of recurrent disease and no other pathology. Barium esophagram showed easy flow of contrast w/o outflow obstruction, w/o leak, and w/o recurrence. - EGD w/ biopsy on 3/16/22 showed intact Toupet fundoplication; no evidence of recurrent hernia. Path of esophagus at 42cm: Active esophagitis with features of reflux esophagitis; Cardiafundic-type glandular mucosa with chronic active inflammation; Negative for Helicobacter microorganisms (IHC); Negative for intestinal metaplasia; Negative for fungal organisms (GMS) - Upper GI endoscopy on 3/18 (Dr. Lissette Fernandez): Dilation in the entire esophagus; Gastroesophageal flap valve classified as Hill Grade I; Z-line, 41 from incisors. EndoFLIP demonstrates absent RAC patter with  maximal EFJ-DI of 2.68 with maximal esophageal diameter of 11.8   Last seen in office 3/31/2022, recommended patient to RTC in Sept 2022 with Barium Esophagram.  S/p EGD biopsy on 7/31/23 by Dr. Lissette Fernandez. Path of esophagus showed mild reactive changes and mild chronic inflammation; negative for active and eosinophilic esophagitis; negative for fungi.  Patient is here today for a follow up. Pt is c/o pain when eating, weight loss, indigestion, and bloody stool.

## 2023-08-15 ENCOUNTER — OUTPATIENT (OUTPATIENT)
Dept: OUTPATIENT SERVICES | Facility: HOSPITAL | Age: 58
LOS: 1 days | End: 2023-08-15
Payer: COMMERCIAL

## 2023-08-15 DIAGNOSIS — Z98.890 OTHER SPECIFIED POSTPROCEDURAL STATES: Chronic | ICD-10-CM

## 2023-08-15 DIAGNOSIS — Z96.89 PRESENCE OF OTHER SPECIFIED FUNCTIONAL IMPLANTS: Chronic | ICD-10-CM

## 2023-08-15 DIAGNOSIS — Z90.89 ACQUIRED ABSENCE OF OTHER ORGANS: Chronic | ICD-10-CM

## 2023-08-15 DIAGNOSIS — Z87.19 PERSONAL HISTORY OF OTHER DISEASES OF THE DIGESTIVE SYSTEM: Chronic | ICD-10-CM

## 2023-08-15 DIAGNOSIS — R13.19 OTHER DYSPHAGIA: ICD-10-CM

## 2023-08-15 PROCEDURE — 74220 X-RAY XM ESOPHAGUS 1CNTRST: CPT

## 2023-08-15 PROCEDURE — 74220 X-RAY XM ESOPHAGUS 1CNTRST: CPT | Mod: 26

## 2023-08-15 RX ORDER — PRUCALOPRIDE 2 MG/1
2 TABLET, FILM COATED ORAL
Qty: 30 | Refills: 3 | Status: ACTIVE | OUTPATIENT
Start: 2022-05-02

## 2023-08-16 ENCOUNTER — OUTPATIENT (OUTPATIENT)
Dept: OUTPATIENT SERVICES | Facility: HOSPITAL | Age: 58
LOS: 1 days | End: 2023-08-16
Payer: COMMERCIAL

## 2023-08-16 VITALS
SYSTOLIC BLOOD PRESSURE: 140 MMHG | HEIGHT: 72 IN | DIASTOLIC BLOOD PRESSURE: 84 MMHG | HEART RATE: 70 BPM | RESPIRATION RATE: 16 BRPM | OXYGEN SATURATION: 100 % | TEMPERATURE: 98 F | WEIGHT: 184.09 LBS

## 2023-08-16 DIAGNOSIS — K21.9 GASTRO-ESOPHAGEAL REFLUX DISEASE WITHOUT ESOPHAGITIS: ICD-10-CM

## 2023-08-16 DIAGNOSIS — Z98.890 OTHER SPECIFIED POSTPROCEDURAL STATES: Chronic | ICD-10-CM

## 2023-08-16 DIAGNOSIS — Z87.09 PERSONAL HISTORY OF OTHER DISEASES OF THE RESPIRATORY SYSTEM: ICD-10-CM

## 2023-08-16 DIAGNOSIS — I10 ESSENTIAL (PRIMARY) HYPERTENSION: ICD-10-CM

## 2023-08-16 DIAGNOSIS — I26.99 OTHER PULMONARY EMBOLISM WITHOUT ACUTE COR PULMONALE: ICD-10-CM

## 2023-08-16 DIAGNOSIS — Z96.89 PRESENCE OF OTHER SPECIFIED FUNCTIONAL IMPLANTS: Chronic | ICD-10-CM

## 2023-08-16 DIAGNOSIS — Z87.19 PERSONAL HISTORY OF OTHER DISEASES OF THE DIGESTIVE SYSTEM: Chronic | ICD-10-CM

## 2023-08-16 DIAGNOSIS — Z90.89 ACQUIRED ABSENCE OF OTHER ORGANS: Chronic | ICD-10-CM

## 2023-08-16 LAB
A1C WITH ESTIMATED AVERAGE GLUCOSE RESULT: 5.7 % — HIGH (ref 4–5.6)
ALBUMIN SERPL ELPH-MCNC: 4.6 G/DL — SIGNIFICANT CHANGE UP (ref 3.3–5)
ALP SERPL-CCNC: 67 U/L — SIGNIFICANT CHANGE UP (ref 40–120)
ALT FLD-CCNC: 55 U/L — HIGH (ref 4–41)
ANION GAP SERPL CALC-SCNC: 9 MMOL/L — SIGNIFICANT CHANGE UP (ref 7–14)
AST SERPL-CCNC: 43 U/L — HIGH (ref 4–40)
BILIRUB SERPL-MCNC: 0.7 MG/DL — SIGNIFICANT CHANGE UP (ref 0.2–1.2)
BUN SERPL-MCNC: 9 MG/DL — SIGNIFICANT CHANGE UP (ref 7–23)
CALCIUM SERPL-MCNC: 9.5 MG/DL — SIGNIFICANT CHANGE UP (ref 8.4–10.5)
CHLORIDE SERPL-SCNC: 102 MMOL/L — SIGNIFICANT CHANGE UP (ref 98–107)
CO2 SERPL-SCNC: 29 MMOL/L — SIGNIFICANT CHANGE UP (ref 22–31)
CREAT SERPL-MCNC: 1.19 MG/DL — SIGNIFICANT CHANGE UP (ref 0.5–1.3)
EGFR: 71 ML/MIN/1.73M2 — SIGNIFICANT CHANGE UP
ESTIMATED AVERAGE GLUCOSE: 117 — SIGNIFICANT CHANGE UP
GLUCOSE SERPL-MCNC: 94 MG/DL — SIGNIFICANT CHANGE UP (ref 70–99)
HCT VFR BLD CALC: 39.6 % — SIGNIFICANT CHANGE UP (ref 39–50)
HGB BLD-MCNC: 13.3 G/DL — SIGNIFICANT CHANGE UP (ref 13–17)
MCHC RBC-ENTMCNC: 30.4 PG — SIGNIFICANT CHANGE UP (ref 27–34)
MCHC RBC-ENTMCNC: 33.6 GM/DL — SIGNIFICANT CHANGE UP (ref 32–36)
MCV RBC AUTO: 90.6 FL — SIGNIFICANT CHANGE UP (ref 80–100)
NRBC # BLD: 0 /100 WBCS — SIGNIFICANT CHANGE UP (ref 0–0)
NRBC # FLD: 0 K/UL — SIGNIFICANT CHANGE UP (ref 0–0)
PLATELET # BLD AUTO: 250 K/UL — SIGNIFICANT CHANGE UP (ref 150–400)
POTASSIUM SERPL-MCNC: 4.6 MMOL/L — SIGNIFICANT CHANGE UP (ref 3.5–5.3)
POTASSIUM SERPL-SCNC: 4.6 MMOL/L — SIGNIFICANT CHANGE UP (ref 3.5–5.3)
PROT SERPL-MCNC: 7.5 G/DL — SIGNIFICANT CHANGE UP (ref 6–8.3)
RBC # BLD: 4.37 M/UL — SIGNIFICANT CHANGE UP (ref 4.2–5.8)
RBC # FLD: 14.7 % — HIGH (ref 10.3–14.5)
SODIUM SERPL-SCNC: 140 MMOL/L — SIGNIFICANT CHANGE UP (ref 135–145)
WBC # BLD: 6.7 K/UL — SIGNIFICANT CHANGE UP (ref 3.8–10.5)
WBC # FLD AUTO: 6.7 K/UL — SIGNIFICANT CHANGE UP (ref 3.8–10.5)

## 2023-08-16 PROCEDURE — 93010 ELECTROCARDIOGRAM REPORT: CPT

## 2023-08-16 RX ORDER — ACETAMINOPHEN 500 MG
2 TABLET ORAL
Qty: 0 | Refills: 0 | DISCHARGE

## 2023-08-16 RX ORDER — LINACLOTIDE 145 UG/1
1 CAPSULE, GELATIN COATED ORAL
Qty: 0 | Refills: 0 | DISCHARGE

## 2023-08-16 RX ORDER — FLUTICASONE PROPIONATE 220 MCG
1 AEROSOL WITH ADAPTER (GRAM) INHALATION
Qty: 0 | Refills: 0 | DISCHARGE

## 2023-08-16 RX ORDER — POLYETHYLENE GLYCOL 3350 17 G/17G
0 POWDER, FOR SOLUTION ORAL
Qty: 0 | Refills: 0 | DISCHARGE

## 2023-08-16 RX ORDER — TADALAFIL 10 MG/1
1 TABLET, FILM COATED ORAL
Qty: 0 | Refills: 0 | DISCHARGE

## 2023-08-16 RX ORDER — ESCITALOPRAM OXALATE 10 MG/1
1 TABLET, FILM COATED ORAL
Qty: 0 | Refills: 0 | DISCHARGE

## 2023-08-16 RX ORDER — SIMETHICONE 80 MG/1
1 TABLET, CHEWABLE ORAL
Qty: 0 | Refills: 0 | DISCHARGE

## 2023-08-16 RX ORDER — GABAPENTIN 400 MG/1
0 CAPSULE ORAL
Qty: 0 | Refills: 0 | DISCHARGE

## 2023-08-16 RX ORDER — SODIUM CHLORIDE 9 MG/ML
1000 INJECTION, SOLUTION INTRAVENOUS
Refills: 0 | Status: DISCONTINUED | OUTPATIENT
Start: 2023-08-24 | End: 2023-09-07

## 2023-08-16 NOTE — H&P PST ADULT - GASTROINTESTINAL COMMENTS
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

## 2023-08-16 NOTE — H&P PST ADULT - PROBLEM SELECTOR PLAN 1
Pt scheduled for surgery and preop instructions including instructions for taking own GI protection   on the day of surgery, given verbally and with use of  written materials, and patient confirming understanding of such instructions using  teach back method.  Pt to see PCP for [facility] for preop Eliquis instructions - surgeon requests 3 days off - last dose would be 8/20/23 if confirmed - request Echo

## 2023-08-16 NOTE — H&P PST ADULT - HISTORY OF PRESENT ILLNESS
Pt is a 58 yr old male scheduled for Esophagogastroguodenoscopy with Dr Javier tentatively 8/24/23   Pt is a 58 yr old male scheduled for Esophagogastroduodenoscopy with Dr Javier tentatively 8/24/23  - pt is 911  withhx HTN, barretts esophagus, chronic gastroparesis with gastric stimulator placed 2018 , b/l PE 2019 on Eliquis , anxiety, anemia and DM - Pt c/o of 10% esophageal motility and need for very small meals - pt has had several surgeries with recurrence of symptoms

## 2023-08-18 ENCOUNTER — APPOINTMENT (OUTPATIENT)
Dept: CT IMAGING | Facility: CLINIC | Age: 58
End: 2023-08-18
Payer: COMMERCIAL

## 2023-08-18 ENCOUNTER — OUTPATIENT (OUTPATIENT)
Dept: OUTPATIENT SERVICES | Facility: HOSPITAL | Age: 58
LOS: 1 days | End: 2023-08-18
Payer: COMMERCIAL

## 2023-08-18 DIAGNOSIS — Z90.89 ACQUIRED ABSENCE OF OTHER ORGANS: Chronic | ICD-10-CM

## 2023-08-18 DIAGNOSIS — Z98.890 OTHER SPECIFIED POSTPROCEDURAL STATES: Chronic | ICD-10-CM

## 2023-08-18 DIAGNOSIS — Z96.89 PRESENCE OF OTHER SPECIFIED FUNCTIONAL IMPLANTS: Chronic | ICD-10-CM

## 2023-08-18 DIAGNOSIS — Z87.19 PERSONAL HISTORY OF OTHER DISEASES OF THE DIGESTIVE SYSTEM: Chronic | ICD-10-CM

## 2023-08-18 DIAGNOSIS — Z00.8 ENCOUNTER FOR OTHER GENERAL EXAMINATION: ICD-10-CM

## 2023-08-18 PROCEDURE — 71250 CT THORAX DX C-: CPT

## 2023-08-18 PROCEDURE — 71250 CT THORAX DX C-: CPT | Mod: 26,MH

## 2023-08-21 ENCOUNTER — APPOINTMENT (OUTPATIENT)
Dept: CARDIOLOGY | Facility: CLINIC | Age: 58
End: 2023-08-21
Payer: COMMERCIAL

## 2023-08-21 ENCOUNTER — NON-APPOINTMENT (OUTPATIENT)
Age: 58
End: 2023-08-21

## 2023-08-21 ENCOUNTER — APPOINTMENT (OUTPATIENT)
Dept: CV DIAGNOSITCS | Facility: HOSPITAL | Age: 58
End: 2023-08-21

## 2023-08-21 ENCOUNTER — OUTPATIENT (OUTPATIENT)
Dept: OUTPATIENT SERVICES | Facility: HOSPITAL | Age: 58
LOS: 1 days | End: 2023-08-21
Payer: COMMERCIAL

## 2023-08-21 VITALS
HEART RATE: 62 BPM | DIASTOLIC BLOOD PRESSURE: 76 MMHG | WEIGHT: 182 LBS | SYSTOLIC BLOOD PRESSURE: 120 MMHG | TEMPERATURE: 97.8 F | OXYGEN SATURATION: 98 % | HEIGHT: 72 IN | BODY MASS INDEX: 24.65 KG/M2

## 2023-08-21 DIAGNOSIS — I10 ESSENTIAL (PRIMARY) HYPERTENSION: ICD-10-CM

## 2023-08-21 DIAGNOSIS — Z98.890 OTHER SPECIFIED POSTPROCEDURAL STATES: Chronic | ICD-10-CM

## 2023-08-21 DIAGNOSIS — Z13.6 ENCOUNTER FOR SCREENING FOR CARDIOVASCULAR DISORDERS: ICD-10-CM

## 2023-08-21 DIAGNOSIS — Z87.19 PERSONAL HISTORY OF OTHER DISEASES OF THE DIGESTIVE SYSTEM: Chronic | ICD-10-CM

## 2023-08-21 DIAGNOSIS — Z01.810 ENCOUNTER FOR PREPROCEDURAL CARDIOVASCULAR EXAMINATION: ICD-10-CM

## 2023-08-21 DIAGNOSIS — Z96.89 PRESENCE OF OTHER SPECIFIED FUNCTIONAL IMPLANTS: Chronic | ICD-10-CM

## 2023-08-21 PROCEDURE — 93000 ELECTROCARDIOGRAM COMPLETE: CPT

## 2023-08-21 PROCEDURE — 93306 TTE W/DOPPLER COMPLETE: CPT | Mod: 26

## 2023-08-21 PROCEDURE — 99214 OFFICE O/P EST MOD 30 MIN: CPT | Mod: 25

## 2023-08-23 ENCOUNTER — TRANSCRIPTION ENCOUNTER (OUTPATIENT)
Age: 58
End: 2023-08-23

## 2023-08-24 ENCOUNTER — APPOINTMENT (OUTPATIENT)
Dept: THORACIC SURGERY | Facility: HOSPITAL | Age: 58
End: 2023-08-24

## 2023-08-24 ENCOUNTER — OUTPATIENT (OUTPATIENT)
Dept: OUTPATIENT SERVICES | Facility: HOSPITAL | Age: 58
LOS: 1 days | Discharge: HOME | End: 2023-08-24
Payer: COMMERCIAL

## 2023-08-24 ENCOUNTER — TRANSCRIPTION ENCOUNTER (OUTPATIENT)
Age: 58
End: 2023-08-24

## 2023-08-24 VITALS
WEIGHT: 184.97 LBS | OXYGEN SATURATION: 100 % | SYSTOLIC BLOOD PRESSURE: 147 MMHG | DIASTOLIC BLOOD PRESSURE: 81 MMHG | HEART RATE: 58 BPM | RESPIRATION RATE: 19 BRPM | TEMPERATURE: 98 F

## 2023-08-24 VITALS
HEART RATE: 61 BPM | SYSTOLIC BLOOD PRESSURE: 137 MMHG | OXYGEN SATURATION: 98 % | RESPIRATION RATE: 14 BRPM | DIASTOLIC BLOOD PRESSURE: 78 MMHG

## 2023-08-24 DIAGNOSIS — K21.9 GASTRO-ESOPHAGEAL REFLUX DISEASE WITHOUT ESOPHAGITIS: ICD-10-CM

## 2023-08-24 DIAGNOSIS — Z98.890 OTHER SPECIFIED POSTPROCEDURAL STATES: Chronic | ICD-10-CM

## 2023-08-24 DIAGNOSIS — Z90.89 ACQUIRED ABSENCE OF OTHER ORGANS: Chronic | ICD-10-CM

## 2023-08-24 DIAGNOSIS — Z87.19 PERSONAL HISTORY OF OTHER DISEASES OF THE DIGESTIVE SYSTEM: Chronic | ICD-10-CM

## 2023-08-24 DIAGNOSIS — Z96.89 PRESENCE OF OTHER SPECIFIED FUNCTIONAL IMPLANTS: Chronic | ICD-10-CM

## 2023-08-24 LAB — GLUCOSE BLDC GLUCOMTR-MCNC: 98 MG/DL — SIGNIFICANT CHANGE UP (ref 70–99)

## 2023-08-24 PROCEDURE — 43248 EGD GUIDE WIRE INSERTION: CPT

## 2023-08-24 DEVICE — CATH ESOPH/PYLO/COL DIL15-18MM: Type: IMPLANTABLE DEVICE | Status: FUNCTIONAL

## 2023-08-24 RX ORDER — TIOTROPIUM BROMIDE 18 UG/1
1 CAPSULE ORAL; RESPIRATORY (INHALATION)
Qty: 0 | Refills: 0 | DISCHARGE

## 2023-08-24 RX ORDER — OMEPRAZOLE 10 MG/1
1 CAPSULE, DELAYED RELEASE ORAL
Qty: 0 | Refills: 0 | DISCHARGE

## 2023-08-24 RX ORDER — FLUTICASONE PROPIONATE AND SALMETEROL 50; 250 UG/1; UG/1
1 POWDER ORAL; RESPIRATORY (INHALATION)
Qty: 0 | Refills: 0 | DISCHARGE

## 2023-08-24 RX ORDER — ZOLPIDEM TARTRATE 10 MG/1
1 TABLET ORAL
Refills: 0 | DISCHARGE

## 2023-08-24 RX ORDER — PRUCALOPRIDE 2 MG/1
1 TABLET, FILM COATED ORAL
Refills: 0 | DISCHARGE

## 2023-08-24 RX ORDER — VORTIOXETINE 5 MG/1
1 TABLET, FILM COATED ORAL
Qty: 0 | Refills: 0 | DISCHARGE

## 2023-08-24 RX ORDER — METOPROLOL TARTRATE 50 MG
1 TABLET ORAL
Qty: 0 | Refills: 0 | DISCHARGE

## 2023-08-24 RX ORDER — ALPRAZOLAM 0.25 MG
1 TABLET ORAL
Refills: 0 | DISCHARGE

## 2023-08-24 RX ORDER — BETHANECHOL CHLORIDE 25 MG
1 TABLET ORAL
Refills: 0 | DISCHARGE

## 2023-08-24 NOTE — BRIEF OPERATIVE NOTE - OPERATION/FINDINGS
Z line at 40cm from the incisors. Slightly narrowed distal esophagus. Intact fundoplication wrap. No hiatal hernia. Food bolus noted in the stomach. Balloon dilation performed of the distal stomach up to 18mmHg.

## 2023-08-24 NOTE — ASU DISCHARGE PLAN (ADULT/PEDIATRIC) - DISCHARGE PLAN IS COMPLETE AND GIVEN TO PATIENT
Alicia King is a 16 y.o. male who presents with right ear pain and fullness x 1 week. Denies cough, fever, ST. The history is provided by the mother and the patient. Pediatric Social History:         Past Medical History:   Diagnosis Date    Club foot     Gastrointestinal disorder     constipation takes miralax    Hydrocephalus [facility])     Neurological disorder      shunt not working per mom    Spina bifida ([address]    [address] decreased     left eye decreased vision        Past Surgical History:   Procedure Laterality Date    HX ORTHOPAEDIC      club feet    NEUROLOGICAL PROCEDURE UNLISTED      shunt         History reviewed. No pertinent family history. Social History     Socioeconomic History    Marital status: SINGLE     Spouse name: Not on file    Number of children: Not on file    Years of education: Not on file    Highest education level: Not on file   Occupational History    Not on file   Tobacco Use    Smoking status: Never Smoker    Smokeless tobacco: Never Used   Substance and Sexual Activity    Alcohol use: No    Drug use: No    Sexual activity: Never   Other Topics Concern    Not on file   Social History Narrative    Not on file     Social Determinants of Health     Financial Resource Strain:     Difficulty of Paying Living Expenses: Not on file   Food Insecurity:     Worried About Running Out of Food in the Last Year: Not on file    Vishnu of Food in the Last Year: Not on file   Transportation Needs:     Lack of Transportation (Medical): Not on file    Lack of Transportation (Non-Medical):  Not on file   Physical Activity:     Days of Exercise per Week: Not on file    Minutes of Exercise per Session: Not on file   Stress:     Feeling of Stress : Not on file   Social Connections:     Frequency of Communication with Friends and Family: Not on file    Frequency of Social Gatherings with Friends and Family: Not on file    Attends Jehovah's witness Services: Not on file  Active Member of Clubs or Organizations: Not on file    Attends Club or Organization Meetings: Not on file    Marital Status: Not on file   Intimate Partner Violence:     Fear of Current or Ex-Partner: Not on file    Emotionally Abused: Not on file    Physically Abused: Not on file    Sexually Abused: Not on file   Housing Stability:     Unable to Pay for Housing in the Last Year: Not on file    Number of [place] in the Last Year: Not on file    Unstable Housing in the Last Year: Not on file                ALLERGIES: Latex, natural rubber    Review of Systems   Constitutional: Negative for fever. HENT: Positive for ear pain. Negative for sore throat. Respiratory: Negative for cough. Vitals:    05/23/22 1510   BP: 107/73   Pulse: 80   Resp: 18   Temp: 98.2 °F (36.8 °C)   SpO2: 99%   Weight: 134 lb (60.8 kg)   Height: 5' 6\" (1.676 m)       Physical Exam  Vitals and nursing note reviewed. Constitutional:       General: He is not in acute distress. Appearance: He is well-developed. He is not diaphoretic. HENT:      Right Ear: Tympanic membrane and ear canal normal. Tenderness (right canal) present. There is impacted cerumen (s/p irrigation). Left Ear: Tympanic membrane and ear canal normal. There is impacted cerumen (s/p irrigation). Pulmonary:      Effort: Pulmonary effort is normal.   Neurological:      Mental Status: He is alert. Psychiatric:         Behavior: Behavior normal.         Thought Content: Thought content normal.         Judgment: Judgment normal.         MDM    ICD-10-CM ICD-9-CM   1. Other infective acute otitis externa of right ear  H60.391 380.10   2. Bilateral impacted cerumen  H61.23 380.4       Orders Placed This Encounter    REMOVE IMPACTED EAR WAX    ofloxacin (FLOXIN) 0.3 % otic solution     Sig: Administer 10 Drops in right ear daily for 7 days. Dispense:  5 mL     Refill:  0        The patient is to follow up with PCP [facility].      If signs and symptoms become worse the pt is to go to the ER.         REMOVE IMPACTED EAR WAX    Date/Time: 5/23/2022 5:22 PM  Performed by: Brandon Hernandez RN  Authorized by: Aliyah Burkett MD     Procedure details:     Location:  L ear and R ear    Procedure type: irrigation    Post-procedure details: Inspection:  TM intact    Hearing quality:  Improved    Patient tolerance of procedure:   Tolerated well, no immediate complications : Yes

## 2023-08-24 NOTE — ASU DISCHARGE PLAN (ADULT/PEDIATRIC) - CARE PROVIDER_API CALL
Dillon Javier  Thoracic Surgery  [phone] [place] [address] [place] [address]  Phone: [phone]  Fax: [phone]  Follow Up Time: 1 month

## 2023-08-24 NOTE — BRIEF OPERATIVE NOTE - NSICDXBRIEFPROCEDURE_GEN_ALL_CORE_FT
PROCEDURES:  EGD, with esophageal dilation using balloon less than 30 mm 24-Aug-2023 09:05:01  Demetrio Gayle

## 2023-08-24 NOTE — ASU PATIENT PROFILE, ADULT - ABILITY TO HEAR (WITH HEARING AID OR HEARING APPLIANCE IF NORMALLY USED):
INDICATION FOR VISIT: establish care, murmur, sob, chest pressure     HPI:       36 year old female with a past medical history of murmur, HTN who is here for evaluation.  Patient recently moved from [place] and here to establish care. Has some SOB and chest pressure with activity.  No edema.  No fever or chills.     PMH:  Past Medical History:   Diagnosis Date   • Anxiety    • Trigeminal neuralgia pain          MEDICATIONS  Current Outpatient Medications   Medication Sig Dispense Refill   • topiramate (TOPAMAX) 100 MG tablet Take 200 mg by mouth daily.        No current facility-administered medications for this visit.          SOCIAL: No tobacco, etoh, or drug use.  Social History     Socioeconomic History   • Marital status: /Civil Union     Spouse name: Not on file   • Number of children: Not on file   • Years of education: Not on file   • Highest education level: Not on file   Occupational History   • Not on file   Social Needs   • Financial resource strain: Not on file   • Food insecurity:     Worry: Not on file     Inability: Not on file   • Transportation needs:     Medical: Not on file     Non-medical: Not on file   Tobacco Use   • Smoking status: Never Smoker   • Smokeless tobacco: Never Used   Substance and Sexual Activity   • Alcohol use: Not on file   • Drug use: Not on file   • Sexual activity: Not on file   Lifestyle   • Physical activity:     Days per week: Not on file     Minutes per session: Not on file   • Stress: Not on file   Relationships   • Social connections:     Talks on phone: Not on file     Gets together: Not on file     Attends [employer] service: Not on file     Active member of club or organization: Not on file     Attends meetings of clubs or organizations: Not on file     Relationship status: Not on file   • Intimate partner violence:     Fear of current or ex partner: Not on file     Emotionally abused: Not on file     Physically abused: Not on file     Forced sexual activity:  Not on file   Other Topics Concern   • Not on file   Social History Narrative   • Not on file       FAMILY HISTORY:  No early coronary artery disease noted.   No family history on file.    ALLERGY:    ALLERGIES:   Allergen Reactions   • Tylenol GI UPSET       PHYSICAL EXAM  Vitals:    05/23/19 1558   BP: 130/90   Pulse: 80   Weight: 63.5 kg   Height: 5' 4\" (1.626 m)     General: Appears stated age, no acute distress.  HEENT: Conjunctivae without exudates or hemorrhage. Pupils symmetric. External inspection of ears and nose appear normal without lesions or masses. Oropharynx appears moist without evidence of ulceration. Nasal mucosa appears moist without evidence of ulceration  Neck: No JVD. No thyroid enlargement.  Cardiac: Normal S1, S2, regular rate and rhythm. Very soft systolic murmur lsb. Pedal pulses 2/2 bilaterally. Lower extremities without evidence of edema. Carotid pulses 2/2 bilaterally, no bruits auscultated.  Pulmonary: no rales, no wheeze  Skin: Warm without evidence of rash  Musculoskeletal: 5/5 strength in all 4 extremities.  Neuro: Cranial nerves 2-12 are grossly intact. Exam of sensation by touch is normal in all 4 extremities.   Psych: Normal mood and affect. Alert and oriented   LYMP: No cervical lymphadenopathy.       REVIEW OF SYSTEMS:    Constitutional:    Negative for fever, chills, diaphoresis, activity change  HENT:    Negative for hearing loss, nosebleeds, congestion and neck pain.    Eyes:    Negative for photophobia and visual disturbance.   Respiratory:    Some sob and exercise intolerance  Cardiovascular:    Mild chest pressure and sob with activity  Gastrointestinal:    Negative for vomiting, abdominal pain, diarrhea  Genitourinary:    Negative for dysuria, frequency, hematuria and difficulty urinating.   Musculoskeletal:    Negative for myalgias, back pain, joint swelling, or gait problem.  Skin:    Negative for color change, pallor, rash and wound.   Neurological:    Negative for  dizziness, vision changes, seizures  Hematological:    Negative for adenopathy. No bleeding  Psychiatric/Behavioral:    Negative for hallucinations, behavioral problems    LAB DATA AND IMAGING:      ecg today: sinus rhythm, no acute changes    ASSESSMENT AND PLAN:    36 year old female with a past medical history of murmur, HTN who is here for evaluation.  Patient recently moved from [place] and here to establish care. Has some SOB and chest pressure with activity.  No edema.  No fever or chills.   1. Sob, chest pressure; hx murmur, HTN  -recently moved from [place]  -has some sob and chest pressure with exertion  -ecg today ok   -stress test and echo ordered  -records requested from [place] cardiologist  -basic labs also ordered    Follow up 1-2months, will call with echo and stress test      Adequate: hears normal conversation without difficulty

## 2023-09-14 ENCOUNTER — APPOINTMENT (OUTPATIENT)
Dept: RADIOLOGY | Facility: HOSPITAL | Age: 58
End: 2023-09-14

## 2023-09-26 ENCOUNTER — APPOINTMENT (OUTPATIENT)
Dept: GASTROENTEROLOGY | Facility: CLINIC | Age: 58
End: 2023-09-26
Payer: COMMERCIAL

## 2023-09-26 VITALS
SYSTOLIC BLOOD PRESSURE: 121 MMHG | DIASTOLIC BLOOD PRESSURE: 75 MMHG | HEIGHT: 72 IN | WEIGHT: 186 LBS | OXYGEN SATURATION: 98 % | BODY MASS INDEX: 25.19 KG/M2 | HEART RATE: 70 BPM

## 2023-09-26 DIAGNOSIS — R13.12 DYSPHAGIA, OROPHARYNGEAL PHASE: ICD-10-CM

## 2023-09-26 PROCEDURE — 99214 OFFICE O/P EST MOD 30 MIN: CPT

## 2023-09-28 ENCOUNTER — RX RENEWAL (OUTPATIENT)
Age: 58
End: 2023-09-28

## 2023-10-01 NOTE — ASU PATIENT PROFILE, ADULT - NSTOBACCO TYPE_GEN_A_CORE_RD
89F PMH MVR, hypothyroidism, HTN, HLD, AF (Warfarin, last taken yesterday), PPM for tachy-mamie syndrome, AOx1 recently admitted to SICU in May 2023 for URI and gallbladder/CBD "sludge ball" s/p course of zosyn but not a surgical candidate, GI deferred ERCP and then admitted in August 2023 for SOB and fever, enterorhinovirus+, COVID+ w/ b/l opacities on CXR, at that time patient also had elevated LFTs that started to resolve so patient did not undergo IR drainage. Now presenting with acute cholangitis and pancreatitis. Recent echo May EF 65-70%, moderate AS, pulmonary HTN. Recent cardiac risk assessment intermed-high risk for intermed risk procedure.Again, patient is not a surgical candidate. Will admit to SICU for hemodynamic monitoring while attempting ERCP vs PTC drainage.    Admit to SICU  NPO/IVF  ABX  Pain/Nausea control PRN  GI consult  IR consult  SCD/warfarin reversal for procedure  Plan discussed with attending  
Cigarettes

## 2023-10-19 ENCOUNTER — NON-APPOINTMENT (OUTPATIENT)
Age: 58
End: 2023-10-19

## 2023-11-20 ENCOUNTER — APPOINTMENT (OUTPATIENT)
Dept: SURGERY | Facility: CLINIC | Age: 58
End: 2023-11-20
Payer: COMMERCIAL

## 2023-11-20 PROCEDURE — 99214 OFFICE O/P EST MOD 30 MIN: CPT

## 2023-11-21 NOTE — ASU PATIENT PROFILE, ADULT - PATIENT'S HEIGHT AND WEIGHT RECORDED IN THE VITAL SIGNS FLOWSHEET
PAT MOTHER CALLING BC THEY MOVED TO [place], AND ARE STARTING WITH A NEW NEURO DR DOWN THERE, BUT OSITO A REFERRAL TO BE SEEN. WONDERING IF WE ARE ABLE TO REFER HIM,     IF ABLE PLEASE FAX [phone]. ATTN DR MAT BALLARD    NEW NEUROLOGIST INFO:  [facility] - DEPT OF CHILD NEURO   [address]  [address] [place] [address]  PHONE: [phone]    CALL MOTHER WITH QUESTIONS AND/OR WHEN DONE    Electronically signed by Sirisha Meier on 11/21/2023 at 3:40 PM     yes

## 2023-12-06 NOTE — H&P PST ADULT - PUPIL R
Patient Specific Otc Recommendations (Will Not Stick From Patient To Patient): Eucerin anti itch lotion with menthol Detail Level: Zone Samples Given: Cerave anti itch lotion with pramoxine round/brisk

## 2023-12-14 ENCOUNTER — APPOINTMENT (OUTPATIENT)
Dept: SURGERY | Facility: CLINIC | Age: 58
End: 2023-12-14
Payer: COMMERCIAL

## 2023-12-14 VITALS
BODY MASS INDEX: 24.92 KG/M2 | WEIGHT: 184 LBS | SYSTOLIC BLOOD PRESSURE: 107 MMHG | DIASTOLIC BLOOD PRESSURE: 67 MMHG | HEIGHT: 72 IN | HEART RATE: 68 BPM | OXYGEN SATURATION: 99 %

## 2023-12-14 PROCEDURE — 99214 OFFICE O/P EST MOD 30 MIN: CPT

## 2023-12-27 RX ORDER — FLUCONAZOLE 200 MG/1
200 TABLET ORAL
Qty: 21 | Refills: 0 | Status: DISCONTINUED | COMMUNITY
Start: 2023-04-28 | End: 2023-12-27

## 2024-01-26 RX ORDER — BETHANECHOL CHLORIDE 10 MG/1
10 TABLET ORAL
Qty: 90 | Refills: 1 | Status: ACTIVE | COMMUNITY
Start: 2022-05-02 | End: 1900-01-01

## 2024-03-18 NOTE — ASU PREOP CHECKLIST - HAND OFF
[facility]    History of Present Illness:   Lesley Lauren is a 76 y.o. female with history of HTN, PE, DVT, DM, COPD, HLD, DCIS, CVA, leg pain   CC: Follow up  History provided by patient and Records    HPI:  Diabetes Mellitus: Patient notes she is taking 2 metformin in the morning and 1 metformin in the evening due to severe diarrhea in the early morning.  Patient is only taking Metformin, has been on Glipizide in the past it seems.    Hypertension Follow up:  The patient reports:  taking medications as instructed, no medication side effects noted, no TIA's, no chest pain on exertion, no dyspnea on exertion, no swelling of ankles, no orthostatic dizziness or lightheadedness, no orthopnea or paroxysmal nocturnal dyspnea.     BP Readings from Last 3 Encounters:   03/18/24 (!) 151/72   03/08/24 134/73   11/01/23 (!) 166/77      COPD: Is now on Prednisone 5 mg daily for control.  No alternatives effective.  Now able to breath better.    History of Saddle PE/DVT: Patient is not on Blood thinner currently.  Stopped taking recently.  History of Unprovoked DVT and Saddle PE.    Tinnitus: Noting bilateral ear ringing, ongoing for some time now.    Health Maintenance  Health Maintenance Due   Topic Date Due    DEXA (modify frequency per FRAX score)  Never done    Respiratory Syncytial Virus (RSV) Pregnant or age 60 yrs+ (1 - 1-dose 60+ series) Never done    DTaP/Tdap/Td vaccine (1 - Tdap) 10/12/2011    Diabetic retinal exam  01/07/2021    COVID-19 Vaccine (3 - 2023-24 season) 09/01/2023    Annual Wellness Visit (Medicare Advantage)  01/01/2024    Diabetic foot exam  02/15/2024       Past Medical, Family, and Social History:     Current Outpatient Medications on File Prior to Visit   Medication Sig Dispense Refill    guaiFENesin-dextromethorphan (ROBITUSSIN DM) 100-10 MG/5ML syrup Take 5 mLs by mouth 3 times daily as needed for Cough 120 mL 0    meclizine (ANTIVERT) 25 MG tablet Take 1 tablet by  Holding RN to OR RN

## 2024-03-25 ENCOUNTER — APPOINTMENT (OUTPATIENT)
Dept: GASTROENTEROLOGY | Facility: CLINIC | Age: 59
End: 2024-03-25
Payer: COMMERCIAL

## 2024-03-25 VITALS
BODY MASS INDEX: 26.01 KG/M2 | HEIGHT: 72 IN | WEIGHT: 192 LBS | HEART RATE: 67 BPM | OXYGEN SATURATION: 98 % | DIASTOLIC BLOOD PRESSURE: 80 MMHG | SYSTOLIC BLOOD PRESSURE: 118 MMHG

## 2024-03-25 DIAGNOSIS — K21.9 GASTRO-ESOPHAGEAL REFLUX DISEASE W/OUT ESOPHAGITIS: ICD-10-CM

## 2024-03-25 DIAGNOSIS — R11.0 NAUSEA: ICD-10-CM

## 2024-03-25 DIAGNOSIS — R13.10 DYSPHAGIA, UNSPECIFIED: ICD-10-CM

## 2024-03-25 DIAGNOSIS — R10.84 GENERALIZED ABDOMINAL PAIN: ICD-10-CM

## 2024-03-25 DIAGNOSIS — K58.1 IRRITABLE BOWEL SYNDROME WITH CONSTIPATION: ICD-10-CM

## 2024-03-25 DIAGNOSIS — K59.09 OTHER CONSTIPATION: ICD-10-CM

## 2024-03-25 DIAGNOSIS — R63.4 ABNORMAL WEIGHT LOSS: ICD-10-CM

## 2024-03-25 DIAGNOSIS — R14.0 ABDOMINAL DISTENSION (GASEOUS): ICD-10-CM

## 2024-03-25 PROCEDURE — 99215 OFFICE O/P EST HI 40 MIN: CPT | Mod: GC

## 2024-03-25 PROCEDURE — G2211 COMPLEX E/M VISIT ADD ON: CPT | Mod: GC

## 2024-03-25 NOTE — HISTORY OF PRESENT ILLNESS
[FreeTextEntry1] : 58-year-old male 911 /retired  with HTN/chronic sinusitis/Dobbins's esophagus/chronic gastroparesis status post gastric stimulator 2018/GPOEM 2019/bilateral PE on Eliquis/diet-controlled diabetes/hiatal hernia status post fundoplication 2011, revision fundoplication and then a toupee in 2016 and 2021. Manometry performed prior to the surgery demonstrated normal amplitude peristalsis and 50% of supine swallows and distal esophageal spasm and 30% of swallows, 20% of swallows demonstrate low amplitude peristalsis. Patient then had upper endoscopy 3/2022 which demonstrated a toupee, dilation in the esophagus, flap valve grade 1, Endoflip with absent RAC, with a maximal EGJ-DI of 2.68 and a diameter of 11.8. Patient also had a repeat manometry 3/18/2022 which demonstrated ineffective esophageal motility, 50% absent swallows, 40% low amplitude swallows. Patient was titrated up to 10 mg of bethanechol 3 times a day.   He continued to have persistent symptoms and underwent repeat EGD/Endoflip April 2023 which showed diffuse white plaques concerning for candida (s/p antifungal therapy) and EndoFLIP showing adequate function of lower esophageal sphincter s/p fundoplication/revisions. He then underwent repeat EGD in July of 2023 which showed 2cm hiatal hernia (hill grade ll).  The patient then followed up with Dr. Javier and underwent another EGD showing a snug wrap/ fundoplication that he then dilated. The patient reports temporary relief of dysphagia symptoms after dilation.   Since August (last EGD with Dr. Javier) the patient has been having worsening of symptoms. He reports dysphagia starting in the AM after he takes his AM medication with a full glass of water. He reports odynophagia and chest pressure that remains present for about 2-3 hours. The patient continues to feel dysphagia to solids and sometimes liquids throughout the day. He reports increase in phlegm and saliva build up in his mouth during these episodes. He has associated nausea without vomiting. The patient continues to take daily PPI and his anticholinergic. He trialed being off the anticholinergic for about 3 weeks but had severe symptoms of dysphagia and gastroparesis requiring him to go back on the medication.   The patient also reports early satiety. His last NMGES showed good transit s/p GPOEM and stimulator. However, he now feels significant bloating, nausea and early satiety. He is continuing to lose weight despite his efforts to gain muscle mass at the gym daily. He reports daily bloating that temporarily is relieved by simethicone. Over the last few months he has required an increase in voltage of his stimulator to help with these symptoms.   Additionally, the patient has been experiencing severe generalized abdominal cramping about 2 times a week. These episodes with last all day and have no specific trigger. The patient will try to have a BM at this time which ranges from [place] 1-7. The patient normally has bristol 5-7 but also reports frequent straining. He intermittently notices blood on the toilet paper with wiping.

## 2024-03-25 NOTE — END OF VISIT
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  Last point-of-care was in August 2023, where patient had EGD with Dr. Dillon Javier with 18 mm dilation for recurrent dysphagia/chest pain symptoms.  He temporarily felt better, now is feeling significant dysphagia with odynophagia not at the level of the oropharynx but in the level of the retrosternum.  He also complains of significant sharp pain in the mid and lower abdomen, erratic bowel movements with stool ranging from [place] #1 to [place] #7, incomplete evacuation and excessive straining.  His last colonoscopy was in 2021 with diverticulosis, fair prep, large hemorrhoids.  Impression Known ineffective esophageal motility with recurrent fundoplication, EGD outflow obstruction as seen by barium tablet hanging up in August 2023 with temporary relief with 18mm dilation IBS-C  Recommendation 1.  Counseled patient to start Colace 3 tabs at night, start psyllium husk 2 tablespoons in the a.m. 2.  May need stimulant laxative for further evacuation 3.  In terms of his chest pain/dysphagia, likely will need repeat endoscopy plus or minus pneumatic dilation or Esoflip, will discuss at esophageal conference tomorrow

## 2024-03-25 NOTE — PHYSICAL EXAM
[Alert] : alert [Healthy Appearing] : healthy appearing [Normal Voice/Communication] : normal voice/communication [No Acute Distress] : no acute distress [Sclera] : the sclera and conjunctiva were normal [Normal Lips/Gums] : the lips and gums were normal [Hearing Threshold Finger Rub Not [place]] : hearing was normal [Oropharynx] : the oropharynx was normal [No Neck Mass] : no neck mass was observed [Normal Appearance] : the appearance of the neck was normal [No Respiratory Distress] : no respiratory distress [No Acc Muscle Use] : no accessory muscle use [Respiration, Rhythm And Depth] : normal respiratory rhythm and effort [Auscultation Breath Sounds / Voice Sounds] : lungs were clear to auscultation bilaterally [Heart Rate And Rhythm] : heart rate was normal and rhythm regular [Normal S1, S2] : normal S1 and S2 [Murmurs] : no murmurs [Bowel Sounds] : normal bowel sounds [Abdomen Tenderness] : non-tender [No Masses] : no abdominal mass palpated [Abdomen Soft] : soft [] : no hepatosplenomegaly [Oriented To Time, Place, And Person] : oriented to person, place, and time

## 2024-03-25 NOTE — ASSESSMENT
[FreeTextEntry1] : 58-year-old male 911 /retired  with HTN/chronic sinusitis/Dobbins's esophagus/chronic gastroparesis status post gastric stimulator 2018/GPOEM 2019/bilateral PE on Eliquis/diet-controlled diabetes/hiatal hernia status post fundoplication 2011, revision fundoplication and then a toupee in 2016 and 2021. Patient continues to have persistent dysphagia, bloating, abdominal cramping, constipation.  Impression: 1. Dysphagia  2. Odynophagia  3. Nausea  4. Bloating 5. IBS-C   Plan:  1. The patient may require repeat EGD with EndoFLIP to assess LES/ wrap (with potential dilation)- Will discuss further at conference with Dr. Javier on Tuesday 3/26 and call patient after.  2. Continue with anticholinergic.   3. Start taking Colace (3 tabs at night) and psyllium husk (1 tablespoon in the AM) for constipation  4. MRA abdomen to check celiac and mesenteric arteries/ vessels to assess for SMA syndrome given excessive weight loss and persistent sxs of bloating/nausea/abd pain   Case discussed with Dr. Rebecca Kenney- PGY4 GI/Hep

## 2024-03-26 ENCOUNTER — NON-APPOINTMENT (OUTPATIENT)
Age: 59
End: 2024-03-26

## 2024-04-01 NOTE — ASU PATIENT PROFILE, ADULT - FALL HARM RISK - UNIVERSAL INTERVENTIONS
Bed in lowest position, wheels locked, appropriate side rails in place/Call bell, personal items and telephone in reach/Instruct patient to call for assistance before getting out of bed or chair/Non-slip footwear when patient is out of bed/[place] to call system/Physically safe environment - no spills, clutter or unnecessary equipment/Purposeful Proactive Rounding/Room/bathroom lighting operational, light cord in reach

## 2024-04-02 ENCOUNTER — OUTPATIENT (OUTPATIENT)
Dept: OUTPATIENT SERVICES | Facility: HOSPITAL | Age: 59
LOS: 1 days | Discharge: HOME | End: 2024-04-02
Payer: COMMERCIAL

## 2024-04-02 ENCOUNTER — APPOINTMENT (OUTPATIENT)
Dept: GASTROENTEROLOGY | Facility: HOSPITAL | Age: 59
End: 2024-04-02

## 2024-04-02 ENCOUNTER — TRANSCRIPTION ENCOUNTER (OUTPATIENT)
Age: 59
End: 2024-04-02

## 2024-04-02 VITALS
DIASTOLIC BLOOD PRESSURE: 82 MMHG | TEMPERATURE: 97 F | RESPIRATION RATE: 18 BRPM | OXYGEN SATURATION: 100 % | WEIGHT: 192.02 LBS | HEIGHT: 72 IN | SYSTOLIC BLOOD PRESSURE: 136 MMHG | HEART RATE: 59 BPM

## 2024-04-02 VITALS
OXYGEN SATURATION: 98 % | RESPIRATION RATE: 20 BRPM | DIASTOLIC BLOOD PRESSURE: 78 MMHG | SYSTOLIC BLOOD PRESSURE: 128 MMHG | HEART RATE: 70 BPM

## 2024-04-02 DIAGNOSIS — Z98.890 OTHER SPECIFIED POSTPROCEDURAL STATES: Chronic | ICD-10-CM

## 2024-04-02 DIAGNOSIS — R13.10 DYSPHAGIA, UNSPECIFIED: ICD-10-CM

## 2024-04-02 DIAGNOSIS — Z96.89 PRESENCE OF OTHER SPECIFIED FUNCTIONAL IMPLANTS: Chronic | ICD-10-CM

## 2024-04-02 DIAGNOSIS — Z90.89 ACQUIRED ABSENCE OF OTHER ORGANS: Chronic | ICD-10-CM

## 2024-04-02 DIAGNOSIS — Z87.19 PERSONAL HISTORY OF OTHER DISEASES OF THE DIGESTIVE SYSTEM: Chronic | ICD-10-CM

## 2024-04-02 PROCEDURE — 43233 EGD BALLOON DIL ESOPH30 MM/>: CPT

## 2024-04-02 DEVICE — CATH ENDOFLIP MEASURE 16MM: Type: IMPLANTABLE DEVICE | Status: FUNCTIONAL

## 2024-04-02 DEVICE — IMPLANTABLE DEVICE: Type: IMPLANTABLE DEVICE | Status: FUNCTIONAL

## 2024-04-02 RX ORDER — TIOTROPIUM BROMIDE 18 UG/1
1 CAPSULE ORAL; RESPIRATORY (INHALATION)
Refills: 0 | DISCHARGE

## 2024-04-02 RX ORDER — APIXABAN 2.5 MG/1
1 TABLET, FILM COATED ORAL
Refills: 0 | DISCHARGE

## 2024-04-02 RX ORDER — ZOLPIDEM TARTRATE 10 MG/1
1 TABLET ORAL
Refills: 0 | DISCHARGE

## 2024-04-02 RX ORDER — VORTIOXETINE 5 MG/1
1 TABLET, FILM COATED ORAL
Refills: 0 | DISCHARGE

## 2024-04-02 RX ORDER — OMEPRAZOLE 10 MG/1
1 CAPSULE, DELAYED RELEASE ORAL
Refills: 0 | DISCHARGE

## 2024-04-02 RX ORDER — SODIUM CHLORIDE 9 MG/ML
500 INJECTION, SOLUTION INTRAVENOUS
Refills: 0 | Status: COMPLETED | OUTPATIENT
Start: 2024-04-02 | End: 2024-04-02

## 2024-04-02 RX ORDER — METOPROLOL TARTRATE 50 MG
0 TABLET ORAL
Refills: 0 | DISCHARGE

## 2024-04-02 RX ADMIN — SODIUM CHLORIDE 30 MILLILITER(S): 9 INJECTION, SOLUTION INTRAVENOUS at 08:48

## 2024-04-02 NOTE — PRE PROCEDURE NOTE - PRE PROCEDURE EVALUATION
Pre-Endoscopy Evaluation    Referring Physician:                                    Indication for Procedure:    dysphagia    Sedation by Anesthesia [X ]    PAST MEDICAL & SURGICAL HISTORY:  HTN (hypertension)      Reactive airway disease      Asthma      GERD (gastroesophageal reflux disease)      Hiatal hernia      Diabetes mellitus      Anxiety  PTSD - [employer] responnder      Low testosterone in male      Osteoarthritis      Intervertebral disc disorder  bulging discs cervical and lumbar region      Shoulder pain, left      Depression      PE (pulmonary thromboembolism)      Insomnia      Gastroparesis      COVID-19 virus infection  h/o 4/2020      COVID-19 vaccine series completed      H/O submucous nasal surgery  septoplasty      S/P laparoscopic fundoplication  2x      S/P Achilles tendon repair  right - done 2x      S/P carpal tunnel release  right      S/P tonsillectomy and adenoidectomy      H/O hiatal hernia  repaired      Presence of gastric pacemaker  2018          Latex allergy: [ ] yes [X] no    Smoking: [ ] yes  [X] no    AICD/PPM: [ ] yes   [X] no    Pertinent lab data:                      Physical Examination:  Daily Height in cm: 182.88 (02 Apr 2024 08:08)    Daily   Vital Signs Last 24 Hrs  T(C): 36.1 (02 Apr 2024 08:08), Max: 36.1 (02 Apr 2024 08:08)  T(F): 97 (02 Apr 2024 08:08), Max: 97 (02 Apr 2024 08:08)  HR: 59 (02 Apr 2024 08:08) (59 - 59)  BP: 136/82 (02 Apr 2024 08:08) (136/82 - 136/82)  BP(mean): --  RR: 18 (02 Apr 2024 08:08) (18 - 18)  SpO2: 100% (02 Apr 2024 08:08) (100% - 100%)        Constitutional: NAD    HEENT: PERRLA, EOMI,       Neck:  No JVD    Respiratory: CTAB/L    Cardiovascular: S1 and S2    Gastrointestinal: BS+, soft, NT/ND    Extremities: No peripheral edema    Neurological: A/O x 3, no focal deficits    Psychiatric: Normal mood, normal affect    : No [place]    Skin: No rashes    Comments:    ASA Class: I [ ]  II [ ]  III [ X]  IV [ ]    The patient is a suitable candidate for the planned procedure unless box checked [ ]  No, explain:

## 2024-04-02 NOTE — ASU DISCHARGE PLAN (ADULT/PEDIATRIC) - CARE PROVIDER_API CALL
Lissette Fernandez  Gastroenterology  [address] [place] [id number]  Phone: [phone]  Fax: [phone]  Follow Up Time: 1 month

## 2024-04-15 ENCOUNTER — APPOINTMENT (OUTPATIENT)
Dept: GASTROENTEROLOGY | Facility: CLINIC | Age: 59
End: 2024-04-15
Payer: COMMERCIAL

## 2024-04-15 DIAGNOSIS — K31.84 GASTROPARESIS: ICD-10-CM

## 2024-04-15 DIAGNOSIS — K59.04 CHRONIC IDIOPATHIC CONSTIPATION: ICD-10-CM

## 2024-04-15 DIAGNOSIS — R13.19 OTHER DYSPHAGIA: ICD-10-CM

## 2024-04-15 PROCEDURE — 99214 OFFICE O/P EST MOD 30 MIN: CPT

## 2024-04-15 PROCEDURE — 99204 OFFICE O/P NEW MOD 45 MIN: CPT

## 2024-04-15 RX ORDER — CHLORDIAZEPOXIDE HYDROCHLORIDE AND CLIDINIUM BROMIDE 5; 2.5 MG/1; MG/1
5-2.5 CAPSULE, GELATIN COATED ORAL
Qty: 60 | Refills: 0 | Status: ACTIVE | COMMUNITY
Start: 2024-04-15 | End: 1900-01-01

## 2024-04-15 NOTE — ASSESSMENT
[FreeTextEntry1] : 50-year-old male with history of hiatal hernia status post multiple fundoplication with resultant persistent dysphagia, Endoflip with borderline esophageal opening, status post pneumatic dilation April 4, 2024 with resulting improvement of dysphagia symptoms.  I have counseled him on continuing with his Colace, psyllium husk, Motegrity Awaiting MRI abdomen Discontinue Xanax, will start Librax for recurrent abdominal pain and cramping sensation Telehealth with me in 3 months

## 2024-04-15 NOTE — HISTORY OF PRESENT ILLNESS
[Home] : at home, [unfilled] , at the time of the visit. 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  4/4/24 patient underwent pneumatic dilation for recurrent dysphagia and is here for check-in post procedure.  Patient states that his dysphagia has significantly improved, he longer feels things stuck at the level of the xiphoid, he is not bringing food back up.  Food is still transiting slowly as expected with IEM.  He does have some nausea, cramping lower abdominal pain.  He is on Motegrity and a stool softener, but still has some straining.  When he does have straining he will feel some discomfort in the lower abdomen around his pacer site.

## 2024-07-15 ENCOUNTER — APPOINTMENT (OUTPATIENT)
Dept: GASTROENTEROLOGY | Facility: CLINIC | Age: 59
End: 2024-07-15
Payer: MEDICARE

## 2024-07-15 VITALS — HEIGHT: 72 IN | WEIGHT: 190 LBS | BODY MASS INDEX: 25.73 KG/M2

## 2024-07-15 DIAGNOSIS — K22.4 DYSKINESIA OF ESOPHAGUS: ICD-10-CM

## 2024-07-15 DIAGNOSIS — K59.09 OTHER CONSTIPATION: ICD-10-CM

## 2024-07-15 DIAGNOSIS — R13.19 OTHER DYSPHAGIA: ICD-10-CM

## 2024-07-15 PROCEDURE — 99215 OFFICE O/P EST HI 40 MIN: CPT

## 2024-07-15 PROCEDURE — G2211 COMPLEX E/M VISIT ADD ON: CPT

## 2024-07-27 ENCOUNTER — TRANSCRIPTION ENCOUNTER (OUTPATIENT)
Age: 59
End: 2024-07-27

## 2024-08-01 ENCOUNTER — INPATIENT (INPATIENT)
Facility: HOSPITAL | Age: 59
LOS: 4 days | Discharge: HOME | DRG: 982 | End: 2024-08-06
Attending: STUDENT IN AN ORGANIZED HEALTH CARE EDUCATION/TRAINING PROGRAM | Admitting: INTERNAL MEDICINE
Payer: MEDICARE

## 2024-08-01 VITALS
RESPIRATION RATE: 16 BRPM | DIASTOLIC BLOOD PRESSURE: 90 MMHG | WEIGHT: 186.95 LBS | HEART RATE: 78 BPM | TEMPERATURE: 98 F | OXYGEN SATURATION: 98 % | SYSTOLIC BLOOD PRESSURE: 175 MMHG

## 2024-08-01 DIAGNOSIS — Z87.19 PERSONAL HISTORY OF OTHER DISEASES OF THE DIGESTIVE SYSTEM: Chronic | ICD-10-CM

## 2024-08-01 DIAGNOSIS — Z98.890 OTHER SPECIFIED POSTPROCEDURAL STATES: Chronic | ICD-10-CM

## 2024-08-01 DIAGNOSIS — Z90.89 ACQUIRED ABSENCE OF OTHER ORGANS: Chronic | ICD-10-CM

## 2024-08-01 DIAGNOSIS — Z96.89 PRESENCE OF OTHER SPECIFIED FUNCTIONAL IMPLANTS: Chronic | ICD-10-CM

## 2024-08-01 LAB
ALBUMIN SERPL ELPH-MCNC: 4 G/DL — SIGNIFICANT CHANGE UP (ref 3.3–5)
ALP SERPL-CCNC: 74 U/L — SIGNIFICANT CHANGE UP (ref 40–120)
ALT FLD-CCNC: 50 U/L — SIGNIFICANT CHANGE UP (ref 12–78)
ANION GAP SERPL CALC-SCNC: 6 MMOL/L — SIGNIFICANT CHANGE UP (ref 5–17)
APTT BLD: 36 SEC — HIGH (ref 24.5–35.6)
AST SERPL-CCNC: 34 U/L — SIGNIFICANT CHANGE UP (ref 15–37)
BASOPHILS # BLD AUTO: 0.02 K/UL — SIGNIFICANT CHANGE UP (ref 0–0.2)
BASOPHILS NFR BLD AUTO: 0.2 % — SIGNIFICANT CHANGE UP (ref 0–2)
BILIRUB SERPL-MCNC: 0.6 MG/DL — SIGNIFICANT CHANGE UP (ref 0.2–1.2)
BUN SERPL-MCNC: 14 MG/DL — SIGNIFICANT CHANGE UP (ref 7–23)
CALCIUM SERPL-MCNC: 9.6 MG/DL — SIGNIFICANT CHANGE UP (ref 8.5–10.1)
CHLORIDE SERPL-SCNC: 105 MMOL/L — SIGNIFICANT CHANGE UP (ref 96–108)
CO2 SERPL-SCNC: 29 MMOL/L — SIGNIFICANT CHANGE UP (ref 22–31)
CREAT SERPL-MCNC: 1.3 MG/DL — SIGNIFICANT CHANGE UP (ref 0.5–1.3)
EGFR: 63 ML/MIN/1.73M2 — SIGNIFICANT CHANGE UP
EOSINOPHIL # BLD AUTO: 0.15 K/UL — SIGNIFICANT CHANGE UP (ref 0–0.5)
EOSINOPHIL NFR BLD AUTO: 1.1 % — SIGNIFICANT CHANGE UP (ref 0–6)
GLUCOSE SERPL-MCNC: 133 MG/DL — HIGH (ref 70–99)
HCT VFR BLD CALC: 39.4 % — SIGNIFICANT CHANGE UP (ref 39–50)
HGB BLD-MCNC: 13.5 G/DL — SIGNIFICANT CHANGE UP (ref 13–17)
IMM GRANULOCYTES NFR BLD AUTO: 0.4 % — SIGNIFICANT CHANGE UP (ref 0–0.9)
INR BLD: 1.04 RATIO — SIGNIFICANT CHANGE UP (ref 0.85–1.18)
LACTATE SERPL-SCNC: 1.2 MMOL/L — SIGNIFICANT CHANGE UP (ref 0.7–2)
LIDOCAIN IGE QN: 23 U/L — SIGNIFICANT CHANGE UP (ref 13–75)
LYMPHOCYTES # BLD AUTO: 1.6 K/UL — SIGNIFICANT CHANGE UP (ref 1–3.3)
LYMPHOCYTES # BLD AUTO: 12.2 % — LOW (ref 13–44)
MCHC RBC-ENTMCNC: 30.6 PG — SIGNIFICANT CHANGE UP (ref 27–34)
MCHC RBC-ENTMCNC: 34.3 GM/DL — SIGNIFICANT CHANGE UP (ref 32–36)
MCV RBC AUTO: 89.3 FL — SIGNIFICANT CHANGE UP (ref 80–100)
MONOCYTES # BLD AUTO: 0.8 K/UL — SIGNIFICANT CHANGE UP (ref 0–0.9)
MONOCYTES NFR BLD AUTO: 6.1 % — SIGNIFICANT CHANGE UP (ref 2–14)
NEUTROPHILS # BLD AUTO: 10.5 K/UL — HIGH (ref 1.8–7.4)
NEUTROPHILS NFR BLD AUTO: 80 % — HIGH (ref 43–77)
NRBC # BLD: 0 /100 WBCS — SIGNIFICANT CHANGE UP (ref 0–0)
PLATELET # BLD AUTO: 249 K/UL — SIGNIFICANT CHANGE UP (ref 150–400)
POTASSIUM SERPL-MCNC: 3.7 MMOL/L — SIGNIFICANT CHANGE UP (ref 3.5–5.3)
POTASSIUM SERPL-SCNC: 3.7 MMOL/L — SIGNIFICANT CHANGE UP (ref 3.5–5.3)
PROT SERPL-MCNC: 7.5 G/DL — SIGNIFICANT CHANGE UP (ref 6–8.3)
PROTHROM AB SERPL-ACNC: 12.2 SEC — SIGNIFICANT CHANGE UP (ref 9.5–13)
RBC # BLD: 4.41 M/UL — SIGNIFICANT CHANGE UP (ref 4.2–5.8)
RBC # FLD: 14 % — SIGNIFICANT CHANGE UP (ref 10.3–14.5)
SODIUM SERPL-SCNC: 140 MMOL/L — SIGNIFICANT CHANGE UP (ref 135–145)
WBC # BLD: 13.12 K/UL — HIGH (ref 3.8–10.5)
WBC # FLD AUTO: 13.12 K/UL — HIGH (ref 3.8–10.5)

## 2024-08-01 PROCEDURE — 99285 EMERGENCY DEPT VISIT HI MDM: CPT | Mod: FS

## 2024-08-01 PROCEDURE — 74177 CT ABD & PELVIS W/CONTRAST: CPT | Mod: 26,MC

## 2024-08-01 RX ORDER — ONDANSETRON HCL/PF 4 MG/2 ML
4 VIAL (ML) INJECTION ONCE
Refills: 0 | Status: COMPLETED | OUTPATIENT
Start: 2024-08-01 | End: 2024-08-01

## 2024-08-01 RX ORDER — HYDROMORPHONE HCL IN 0.9% NACL 0.2 MG/ML
1 PLASTIC BAG, INJECTION (ML) INTRAVENOUS ONCE
Refills: 0 | Status: DISCONTINUED | OUTPATIENT
Start: 2024-08-01 | End: 2024-08-01

## 2024-08-01 RX ORDER — BACTERIOSTATIC SODIUM CHLORIDE 0.9 %
1000 VIAL (ML) INJECTION ONCE
Refills: 0 | Status: COMPLETED | OUTPATIENT
Start: 2024-08-01 | End: 2024-08-01

## 2024-08-01 RX ORDER — IOHEXOL 240 MG/ML
30 INJECTION, SOLUTION INTRATHECAL; INTRAVASCULAR; INTRAVENOUS; ORAL ONCE
Refills: 0 | Status: COMPLETED | OUTPATIENT
Start: 2024-08-01 | End: 2024-08-01

## 2024-08-01 RX ADMIN — Medication 4 MILLIGRAM(S): at 18:29

## 2024-08-01 RX ADMIN — Medication 1000 MILLILITER(S): at 18:29

## 2024-08-01 RX ADMIN — Medication 4 MILLIGRAM(S): at 18:45

## 2024-08-01 RX ADMIN — IOHEXOL 30 MILLILITER(S): 240 INJECTION, SOLUTION INTRATHECAL; INTRAVASCULAR; INTRAVENOUS; ORAL at 18:32

## 2024-08-01 RX ADMIN — Medication 1 MILLIGRAM(S): at 22:04

## 2024-08-01 RX ADMIN — Medication 1 MILLIGRAM(S): at 19:02

## 2024-08-01 RX ADMIN — Medication 1 MILLIGRAM(S): at 18:47

## 2024-08-01 RX ADMIN — Medication 1 MILLIGRAM(S): at 22:19

## 2024-08-01 NOTE — ED ADULT NURSE NOTE - CAS TRG GEN SKIN CONDITION
Right Sided Acute otitis Media (Ear Infection):  Discussed diagnosis, treatment, typical course of condition and complications  Amoxicillin 90 mg/kg/day divided BID x 7 days  Monitor for worsening ear pain or ear drainage and call if present  If fevers or worsening pain after 2-3 days of antibiotics then call for follow up  Monitor PO intake and UOP for signs of dehydration    Stye supportive care  Apply warm compresses to eye and avoid rubbing (warm washcloth, or cooled down but warm chamomile tea bag)  This is not an infection needing drops but if dry eyes, can use OTC saline eye drops as needed like Artifical Tears.  Eye doctors recommend starting Omega-3 supplement as a Flaxseed oil/powder or daily supplement daily to help \"declog\" the glands near the eye.  Also, eye docs suggest to try \"washing\" the eyelashes with gentle tear free kids shampoo or an even better eye wash called Ocusoft Hypochlor available online.  If lots of eye rubbing, starting a daily antihistamine to alleviate the itching and potential eye rubbing is helpful. A 24 hour acting medicine like Zyrtec or Claritin daily for a few weeks.     Additionally, can certainly visit an eye doctor but they will say to try the above options first also, so perhaps try this for another few weeks and if not improving call back.    Tested for COVID, Strep, and Flu in clinic. Will call with results.  Mariano Stark MD       Warm

## 2024-08-01 NOTE — ED ADULT NURSE NOTE - OBJECTIVE STATEMENT
Pt. received alert and oriented x4 with chief complaint of generalized intermittent abdominal pain for two weeks. Pt. denies any N/V/D. Pt. presents w/ gastric stimulator.

## 2024-08-01 NOTE — CONSULT NOTE ADULT - ASSESSMENT
59yM with PMHx of HTN, b/l PE (on eliquis), gastroperisis, Dobbins's esophagus, anemia, COPD, Depression, PTSD, and PSHx of fundoplication x 3 (repair of recurrent hiatal hernia), gastric pacemaker w/ Dr. Herrera 2018 and "sinus surgery" coming in with 1 month of abdominal pain worsening over past 3 days, worse this morning. Labs with slightly elevated WBC of 13.12.       - Admit to medicine for management of comorbidities  - Explained risks vs benefits of NGT placement, however pt refusing d/t previous sinus surgery  - NPO  - Manage conservatively at this time  - GI consult  - Patient seen with Dr. Schaffer

## 2024-08-01 NOTE — ED ADULT NURSE NOTE - NSFALLUNIVINTERV_ED_ALL_ED
Bed/Stretcher in lowest position, wheels locked, appropriate side rails in place/Call bell, personal items and telephone in reach/Instruct patient to call for assistance before getting out of bed/chair/stretcher/Non-slip footwear applied when patient is off stretcher/[place] to call system/Physically safe environment - no spills, clutter or unnecessary equipment/Purposeful proactive rounding/Room/bathroom lighting operational, light cord in reach

## 2024-08-01 NOTE — ED PROVIDER NOTE - CLINICAL SUMMARY MEDICAL DECISION MAKING FREE TEXT BOX
Patient is a 59-year-old gentleman who presents to the emergency room with abdominal pain.  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  Patient is presenting today with 1 month of abdominal pain worsening over the last 3 days significantly worse this morning.  Denies any fevers chills chest pain shortness of breath.  Does endorse he did have a small bowel movement this morning.  Further notes associated nausea and vomiting.  On exam patient is lying in bed comfortable but is status post pain medication.  Normocephalic atraumatic pupils equal round and reactive heart regular rate lungs clear to auscultation abdomen is soft with diffuse tenderness to palpation in all quadrants with associated guarding positive bowel sounds although hypoactive.  Patient is presenting to the emergency room with diffuse abdominal pain nausea vomiting history of multiple prior surgical procedures.  Differential includes possible small bowel obstruction versus colitis versus diverticulitis versus additional intra-abdominal pathology.  Will obtain screening labs CT imaging with oral and IV contrast will medicate as needed for pain and monitor.  Ultimate clinical disposition will be pending results.  Independent review of CT imaging reveals a small bowel obstruction surgery consulted.  Independent review of EKG reveals a normal sinus rhythm at 83 bpm.

## 2024-08-01 NOTE — ED PROVIDER NOTE - WHICH SHOWED
Independent review of CT imaging reveals a small bowel obstruction surgery consulted.  Independent review of EKG reveals a normal sinus rhythm at 83 bpm

## 2024-08-01 NOTE — ED PROVIDER NOTE - DIFFERENTIAL DIAGNOSIS
Differential Diagnosis Differential diagnosis includes but not limited to obstruction, colitis, diverticulitis, UTI.

## 2024-08-02 DIAGNOSIS — K21.9 GASTRO-ESOPHAGEAL REFLUX DISEASE WITHOUT ESOPHAGITIS: ICD-10-CM

## 2024-08-02 DIAGNOSIS — Z29.9 ENCOUNTER FOR PROPHYLACTIC MEASURES, UNSPECIFIED: ICD-10-CM

## 2024-08-02 DIAGNOSIS — I10 ESSENTIAL (PRIMARY) HYPERTENSION: ICD-10-CM

## 2024-08-02 DIAGNOSIS — K56.609 UNSPECIFIED INTESTINAL OBSTRUCTION, UNSPECIFIED AS TO PARTIAL VERSUS COMPLETE OBSTRUCTION: ICD-10-CM

## 2024-08-02 DIAGNOSIS — Z86.718 PERSONAL HISTORY OF OTHER VENOUS THROMBOSIS AND EMBOLISM: ICD-10-CM

## 2024-08-02 DIAGNOSIS — G47.00 INSOMNIA, UNSPECIFIED: ICD-10-CM

## 2024-08-02 LAB
ALBUMIN SERPL ELPH-MCNC: 3.3 G/DL — SIGNIFICANT CHANGE UP (ref 3.3–5)
ALP SERPL-CCNC: 66 U/L — SIGNIFICANT CHANGE UP (ref 40–120)
ALT FLD-CCNC: 41 U/L — SIGNIFICANT CHANGE UP (ref 12–78)
ANION GAP SERPL CALC-SCNC: 2 MMOL/L — LOW (ref 5–17)
APPEARANCE UR: CLEAR — SIGNIFICANT CHANGE UP
APTT BLD: 36.4 SEC — HIGH (ref 24.5–35.6)
AST SERPL-CCNC: 26 U/L — SIGNIFICANT CHANGE UP (ref 15–37)
BASOPHILS # BLD AUTO: 0.02 K/UL — SIGNIFICANT CHANGE UP (ref 0–0.2)
BASOPHILS NFR BLD AUTO: 0.2 % — SIGNIFICANT CHANGE UP (ref 0–2)
BILIRUB SERPL-MCNC: 0.8 MG/DL — SIGNIFICANT CHANGE UP (ref 0.2–1.2)
BILIRUB UR-MCNC: NEGATIVE — SIGNIFICANT CHANGE UP
BUN SERPL-MCNC: 9 MG/DL — SIGNIFICANT CHANGE UP (ref 7–23)
CALCIUM SERPL-MCNC: 9 MG/DL — SIGNIFICANT CHANGE UP (ref 8.5–10.1)
CHLORIDE SERPL-SCNC: 108 MMOL/L — SIGNIFICANT CHANGE UP (ref 96–108)
CO2 SERPL-SCNC: 31 MMOL/L — SIGNIFICANT CHANGE UP (ref 22–31)
COLOR SPEC: YELLOW — SIGNIFICANT CHANGE UP
CREAT SERPL-MCNC: 1.2 MG/DL — SIGNIFICANT CHANGE UP (ref 0.5–1.3)
DIFF PNL FLD: NEGATIVE — SIGNIFICANT CHANGE UP
EGFR: 70 ML/MIN/1.73M2 — SIGNIFICANT CHANGE UP
EOSINOPHIL # BLD AUTO: 0.18 K/UL — SIGNIFICANT CHANGE UP (ref 0–0.5)
EOSINOPHIL NFR BLD AUTO: 2 % — SIGNIFICANT CHANGE UP (ref 0–6)
GLUCOSE SERPL-MCNC: 105 MG/DL — HIGH (ref 70–99)
GLUCOSE UR QL: NEGATIVE MG/DL — SIGNIFICANT CHANGE UP
HCT VFR BLD CALC: 37.5 % — LOW (ref 39–50)
HGB BLD-MCNC: 12.5 G/DL — LOW (ref 13–17)
IMM GRANULOCYTES NFR BLD AUTO: 0.3 % — SIGNIFICANT CHANGE UP (ref 0–0.9)
INR BLD: 1.04 RATIO — SIGNIFICANT CHANGE UP (ref 0.85–1.18)
KETONES UR-MCNC: NEGATIVE MG/DL — SIGNIFICANT CHANGE UP
LEUKOCYTE ESTERASE UR-ACNC: NEGATIVE — SIGNIFICANT CHANGE UP
LYMPHOCYTES # BLD AUTO: 1.65 K/UL — SIGNIFICANT CHANGE UP (ref 1–3.3)
LYMPHOCYTES # BLD AUTO: 18.2 % — SIGNIFICANT CHANGE UP (ref 13–44)
MAGNESIUM SERPL-MCNC: 1.9 MG/DL — SIGNIFICANT CHANGE UP (ref 1.6–2.6)
MCHC RBC-ENTMCNC: 30 PG — SIGNIFICANT CHANGE UP (ref 27–34)
MCHC RBC-ENTMCNC: 33.3 GM/DL — SIGNIFICANT CHANGE UP (ref 32–36)
MCV RBC AUTO: 90.1 FL — SIGNIFICANT CHANGE UP (ref 80–100)
MONOCYTES # BLD AUTO: 0.78 K/UL — SIGNIFICANT CHANGE UP (ref 0–0.9)
MONOCYTES NFR BLD AUTO: 8.6 % — SIGNIFICANT CHANGE UP (ref 2–14)
NEUTROPHILS # BLD AUTO: 6.39 K/UL — SIGNIFICANT CHANGE UP (ref 1.8–7.4)
NEUTROPHILS NFR BLD AUTO: 70.7 % — SIGNIFICANT CHANGE UP (ref 43–77)
NITRITE UR-MCNC: NEGATIVE — SIGNIFICANT CHANGE UP
NRBC # BLD: 0 /100 WBCS — SIGNIFICANT CHANGE UP (ref 0–0)
PH UR: 7 — SIGNIFICANT CHANGE UP (ref 5–8)
PHOSPHATE SERPL-MCNC: 2.8 MG/DL — SIGNIFICANT CHANGE UP (ref 2.5–4.5)
PLATELET # BLD AUTO: 205 K/UL — SIGNIFICANT CHANGE UP (ref 150–400)
POTASSIUM SERPL-MCNC: 4.4 MMOL/L — SIGNIFICANT CHANGE UP (ref 3.5–5.3)
POTASSIUM SERPL-SCNC: 4.4 MMOL/L — SIGNIFICANT CHANGE UP (ref 3.5–5.3)
PROT SERPL-MCNC: 6.8 G/DL — SIGNIFICANT CHANGE UP (ref 6–8.3)
PROT UR-MCNC: NEGATIVE MG/DL — SIGNIFICANT CHANGE UP
PROTHROM AB SERPL-ACNC: 12.2 SEC — SIGNIFICANT CHANGE UP (ref 9.5–13)
RBC # BLD: 4.16 M/UL — LOW (ref 4.2–5.8)
RBC # FLD: 13.9 % — SIGNIFICANT CHANGE UP (ref 10.3–14.5)
SODIUM SERPL-SCNC: 141 MMOL/L — SIGNIFICANT CHANGE UP (ref 135–145)
SP GR SPEC: 1.05 — HIGH (ref 1–1.03)
UROBILINOGEN FLD QL: 1 MG/DL — SIGNIFICANT CHANGE UP (ref 0.2–1)
WBC # BLD: 9.05 K/UL — SIGNIFICANT CHANGE UP (ref 3.8–10.5)
WBC # FLD AUTO: 9.05 K/UL — SIGNIFICANT CHANGE UP (ref 3.8–10.5)

## 2024-08-02 PROCEDURE — 99222 1ST HOSP IP/OBS MODERATE 55: CPT

## 2024-08-02 PROCEDURE — 74019 RADEX ABDOMEN 2 VIEWS: CPT | Mod: 26

## 2024-08-02 PROCEDURE — 99223 1ST HOSP IP/OBS HIGH 75: CPT

## 2024-08-02 PROCEDURE — 93010 ELECTROCARDIOGRAM REPORT: CPT | Mod: 76

## 2024-08-02 PROCEDURE — 74250 X-RAY XM SM INT 1CNTRST STD: CPT | Mod: 26

## 2024-08-02 PROCEDURE — 71045 X-RAY EXAM CHEST 1 VIEW: CPT | Mod: 26

## 2024-08-02 PROCEDURE — 99232 SBSQ HOSP IP/OBS MODERATE 35: CPT

## 2024-08-02 RX ORDER — SODIUM CHLORIDE AND POTASSIUM CHLORIDE 150; 450 MG/100ML; MG/100ML
1000 INJECTION, SOLUTION INTRAVENOUS
Refills: 0 | Status: COMPLETED | OUTPATIENT
Start: 2024-08-02 | End: 2024-08-03

## 2024-08-02 RX ORDER — ALBUTEROL 90 MCG
2 AEROSOL REFILL (GRAM) INHALATION
Refills: 0 | DISCHARGE

## 2024-08-02 RX ORDER — OMEPRAZOLE 100 %
1 POWDER (GRAM) MISCELLANEOUS
Refills: 0 | DISCHARGE

## 2024-08-02 RX ORDER — TRIAMCINOLONE ACETONIDE 55 MCG
0 AEROSOL WITH ADAPTER (GRAM) NASAL
Refills: 0 | DISCHARGE

## 2024-08-02 RX ORDER — ACETAMINOPHEN 500 MG
1000 TABLET ORAL ONCE
Refills: 0 | Status: COMPLETED | OUTPATIENT
Start: 2024-08-02 | End: 2024-08-02

## 2024-08-02 RX ORDER — TIOTROPIUM BROMIDE 18 UG/1
1 CAPSULE ORAL; RESPIRATORY (INHALATION)
Refills: 0 | DISCHARGE

## 2024-08-02 RX ORDER — HEPARIN SODIUM 1000 [USP'U]/ML
5000 INJECTION, SOLUTION INTRAVENOUS; SUBCUTANEOUS EVERY 8 HOURS
Refills: 0 | Status: DISCONTINUED | OUTPATIENT
Start: 2024-08-02 | End: 2024-08-04

## 2024-08-02 RX ORDER — ALPRAZOLAM 0.5 MG
1 TABLET ORAL
Refills: 0 | DISCHARGE

## 2024-08-02 RX ORDER — FLUTICASONE PROPION/SALMETEROL 500-50 MCG
1 BLISTER, WITH INHALATION DEVICE INHALATION
Refills: 0 | DISCHARGE

## 2024-08-02 RX ORDER — MELATONIN 3 MG
3 TABLET ORAL AT BEDTIME
Refills: 0 | Status: DISCONTINUED | OUTPATIENT
Start: 2024-08-02 | End: 2024-08-06

## 2024-08-02 RX ORDER — DIPHENHYDRAMINE HCL 25 MG
25 CAPSULE ORAL ONCE
Refills: 0 | Status: COMPLETED | OUTPATIENT
Start: 2024-08-02 | End: 2024-08-02

## 2024-08-02 RX ORDER — HEPARIN SODIUM 1000 [USP'U]/ML
5000 INJECTION, SOLUTION INTRAVENOUS; SUBCUTANEOUS EVERY 12 HOURS
Refills: 0 | Status: DISCONTINUED | OUTPATIENT
Start: 2024-08-02 | End: 2024-08-02

## 2024-08-02 RX ORDER — METOCLOPRAMIDE HCL 5 MG/ML
5 VIAL (ML) INJECTION
Refills: 0 | Status: DISCONTINUED | OUTPATIENT
Start: 2024-08-02 | End: 2024-08-06

## 2024-08-02 RX ORDER — MAGNESIUM, ALUMINUM HYDROXIDE 200-225/5
30 SUSPENSION, ORAL (FINAL DOSE FORM) ORAL EVERY 4 HOURS
Refills: 0 | Status: DISCONTINUED | OUTPATIENT
Start: 2024-08-02 | End: 2024-08-06

## 2024-08-02 RX ORDER — BETHANECHOL CHLORIDE 10 MG/1
1 TABLET ORAL
Refills: 0 | DISCHARGE

## 2024-08-02 RX ORDER — ONDANSETRON HCL/PF 4 MG/2 ML
4 VIAL (ML) INJECTION EVERY 8 HOURS
Refills: 0 | Status: DISCONTINUED | OUTPATIENT
Start: 2024-08-02 | End: 2024-08-06

## 2024-08-02 RX ORDER — PRUCALOPRIDE 1 MG/1
1 TABLET, FILM COATED ORAL
Refills: 0 | DISCHARGE

## 2024-08-02 RX ORDER — PANTOPRAZOLE SODIUM 20 MG/1
40 TABLET, DELAYED RELEASE ORAL DAILY
Refills: 0 | Status: DISCONTINUED | OUTPATIENT
Start: 2024-08-02 | End: 2024-08-05

## 2024-08-02 RX ORDER — ONDANSETRON HCL/PF 4 MG/2 ML
1 VIAL (ML) INJECTION
Refills: 0 | DISCHARGE

## 2024-08-02 RX ORDER — DIATRIZOATE MEGLUMINE 60 %
100 VIAL (ML) INJECTION ONCE
Refills: 0 | Status: COMPLETED | OUTPATIENT
Start: 2024-08-02 | End: 2024-08-02

## 2024-08-02 RX ORDER — METOPROLOL TARTRATE 100 MG
1 TABLET ORAL
Refills: 0 | DISCHARGE

## 2024-08-02 RX ADMIN — HEPARIN SODIUM 5000 UNIT(S): 1000 INJECTION, SOLUTION INTRAVENOUS; SUBCUTANEOUS at 14:57

## 2024-08-02 RX ADMIN — Medication 1000 MILLIGRAM(S): at 19:17

## 2024-08-02 RX ADMIN — Medication 100 MILLILITER(S): at 11:27

## 2024-08-02 RX ADMIN — HEPARIN SODIUM 5000 UNIT(S): 1000 INJECTION, SOLUTION INTRAVENOUS; SUBCUTANEOUS at 21:26

## 2024-08-02 RX ADMIN — SODIUM CHLORIDE AND POTASSIUM CHLORIDE 85 MILLILITER(S): 150; 450 INJECTION, SOLUTION INTRAVENOUS at 03:47

## 2024-08-02 RX ADMIN — HEPARIN SODIUM 5000 UNIT(S): 1000 INJECTION, SOLUTION INTRAVENOUS; SUBCUTANEOUS at 06:28

## 2024-08-02 RX ADMIN — Medication 400 MILLIGRAM(S): at 02:19

## 2024-08-02 RX ADMIN — PANTOPRAZOLE SODIUM 40 MILLIGRAM(S): 20 TABLET, DELAYED RELEASE ORAL at 14:57

## 2024-08-02 RX ADMIN — Medication 25 MILLIGRAM(S): at 02:40

## 2024-08-02 RX ADMIN — Medication 25 MILLIGRAM(S): at 21:26

## 2024-08-02 RX ADMIN — SODIUM CHLORIDE AND POTASSIUM CHLORIDE 85 MILLILITER(S): 150; 450 INJECTION, SOLUTION INTRAVENOUS at 17:48

## 2024-08-02 RX ADMIN — Medication 400 MILLIGRAM(S): at 18:47

## 2024-08-02 NOTE — H&P ADULT - NSHPPHYSICALEXAM_GEN_ALL_CORE
T(C): 36.7 (08-01-24 @ 22:00), Max: 36.7 (08-01-24 @ 17:21)  HR: 92 (08-01-24 @ 22:00) (78 - 92)  BP: 150/74 (08-01-24 @ 22:00) (150/74 - 175/90)  RR: 19 (08-01-24 @ 22:00) (16 - 19)  SpO2: 97% (08-01-24 @ 22:00) (97% - 98%)    GENERAL: patient appears well, no acute distress, appropriate, pleasant  EYES: sclera clear, no exudates  ENMT: oropharynx clear without erythema, no exudates, moist mucous membranes  NECK: supple, soft, no thyromegaly noted  LUNGS: good air entry bilaterally, clear to auscultation, symmetric breath sounds, no wheezing or rhonchi appreciated  HEART: soft S1/S2, regular rate and rhythm, no murmurs noted, no lower extremity edema  GASTROINTESTINAL: abdomen is soft, tender, moderately distended, + bowel sounds  INTEGUMENT: good skin turgor, no lesions noted  MUSCULOSKELETAL: no clubbing or cyanosis, no obvious deformity  NEUROLOGIC: awake, alert, oriented x3, good muscle tone in 4 extremities, no obvious sensory deficits

## 2024-08-02 NOTE — PROGRESS NOTE ADULT - SUBJECTIVE AND OBJECTIVE BOX
Patient is seen and examined this am. His abdominal pain is slightly better. He has not passed any gas yet. No vomiting this am. No other new complain today.     Gen: No fever, chills, weakness  ENT: No visual changes or throat pain  Neck: No pain or stiffness  Respiratory: No cough or wheezing  Cardiovascular: No chest pain or palpitations  Gastrointestinal: No abdominal pain, nausea, vomiting, constipation, or diarrhea  Hematologic: No easy bleeding or bruising  Neurologic: No numbness or focal weakness  Psych: No depression or insomnia  Skin: No rash or itching      MEDICATIONS  (STANDING):  acetaminophen   IVPB .. 1000 milliGRAM(s) IV Intermittent once  acetaminophen   IVPB .. 1000 milliGRAM(s) IV Intermittent once  dextrose 5% + lactated ringers with potassium chloride 20 mEq/L 1000 milliLiter(s) (85 mL/Hr) IV Continuous <Continuous>  diatrizoate meglumine/diatrizoate sodium. 100 milliLiter(s) Oral once  heparin   Injectable 5000 Unit(s) SubCutaneous every 8 hours  pantoprazole  Injectable 40 milliGRAM(s) IV Push daily    MEDICATIONS  (PRN):  aluminum hydroxide/magnesium hydroxide/simethicone Suspension 30 milliLiter(s) Oral every 4 hours PRN Dyspepsia  melatonin 3 milliGRAM(s) Oral at bedtime PRN Insomnia  metoclopramide 5 milliGRAM(s) Oral three times a day before meals PRN Gastroparesis  ondansetron Injectable 4 milliGRAM(s) IV Push every 8 hours PRN Nausea and/or Vomiting      Vital Signs Last 24 Hrs  T(C): 36.7 (02 Aug 2024 08:17), Max: 36.7 (01 Aug 2024 17:21)  T(F): 98 (02 Aug 2024 08:17), Max: 98 (01 Aug 2024 17:21)  HR: 80 (02 Aug 2024 08:17) (74 - 92)  BP: 123/75 (02 Aug 2024 08:17) (123/75 - 175/90)  BP(mean): --  RR: 16 (02 Aug 2024 08:17) (16 - 19)  SpO2: 99% (02 Aug 2024 08:17) (97% - 99%)    Parameters below as of 02 Aug 2024 08:17  Patient On (Oxygen Delivery Method): room air        GEN: NAD, comfortable, resting in bed  HEENT: NC/AT, EOMI, PERRLA, MMM, clear conjunctiva and sclera, normal hearing, no nasal discharge, throat clear, no thrush, normal dentition.   NECK: supple, no JVD, no LAD, no thyromegaly  BACK:  ROM intact, no spinal/paraspinal tenderness  CV: S1S2, RRR, no mumur  RESP: good air movement, CTABL, no rales, rhonchi or wheezing, respirations unlabored  ABD: +BS, soft, ND, NT, no guarding, no rigidity, no HSM  EXT: +2 radial and pedal pulses, no edema, no calve tenderness  SKIN: No visible Rashes or Ulcers  MSK: ROM intact in all extremities  NEURO:  sensory and CN 2-12 Grossly intact, no motor deficits, AOx3  PSYCH: normal behavior         LABS:                          12.5   9.05  )-----------( 205      ( 02 Aug 2024 04:35 )             37.5     02 Aug 2024 04:35    141    |  108    |  9      ----------------------------<  105    4.4     |  31     |  1.20     Ca    9.0        02 Aug 2024 04:35  Phos  2.8       02 Aug 2024 04:35  Mg     1.9       02 Aug 2024 04:35    TPro  6.8    /  Alb  3.3    /  TBili  0.8    /  DBili  x      /  AST  26     /  ALT  41     /  AlkPhos  66     02 Aug 2024 04:35    LIVER FUNCTIONS - ( 02 Aug 2024 04:35 )  Alb: 3.3 g/dL / Pro: 6.8 g/dL / ALK PHOS: 66 U/L / ALT: 41 U/L / AST: 26 U/L / GGT: x           PT/INR - ( 02 Aug 2024 04:35 )   PT: 12.2 sec;   INR: 1.04 ratio         PTT - ( 02 Aug 2024 04:35 )  PTT:36.4 sec  CAPILLARY BLOOD GLUCOSE            Urinalysis Basic - ( 02 Aug 2024 04:35 )    Color: x / Appearance: x / SG: x / pH: x  Gluc: 105 mg/dL / Ketone: x  / Bili: x / Urobili: x   Blood: x / Protein: x / Nitrite: x   Leuk Esterase: x / RBC: x / WBC x   Sq Epi: x / Non Sq Epi: x / Bacteria: x        RADIOLOGY:         Finasteride Pregnancy And Lactation Text: This medication is absolutely contraindicated during pregnancy. It is unknown if it is excreted in breast milk.

## 2024-08-02 NOTE — CARE COORDINATION ASSESSMENT. - NSCAREPROVIDERS_GEN_ALL_CORE_FT
CARE PROVIDERS:  Accepting Physician: Kate Lepe  Administration: Radha Estrada  Administration: Manjinder Lombardo  Administration: Radha Serra  Admitting: Kate Lepe  Attending: Estelita Baig  Consultant: Charmaine Sahni  Consultant: Khan, Fahrina  Consultant: Almaz Santillan  Consultant: Maik Schaffer  Consultant: Ashvin Ca  Covering Team: Moshe Ogden  Covering Team: Maik Schaffer  Covering Team: Jarrett Coronado  Covering Team: Kate Lepe  ED ACP: Minerva Hawley  ED Attending: Silvina Rodriguez  ED Nurse: Andrey Mcintosh  Emergency Medicine: Jennifer Packer  Emergency Medicine: Minerva Hawley  Outpatient Provider: Geoffrey Uribe  PCA/Nursing Assistant: Nely Hollingsworth  Primary Team: Estelita Baig  Primary Team: Cruz Herrera  Primary Team: Ankit Ruiz  Respiratory Therapy: Emmanuelle Riggs  : Cristina Leonardo  Student: Soham Banda// Supp. Assoc.: Daquan Connor

## 2024-08-02 NOTE — CONSULT NOTE ADULT - ASSESSMENT
sbo    npo  iv fluid  surgery eval noted  patient refusing NGT  monitor clinical course  no role for  upper gastrointestinal endoscopy at present  outpatient follow up with primary GI after discharge

## 2024-08-02 NOTE — PROGRESS NOTE ADULT - SUBJECTIVE AND OBJECTIVE BOX
SUBJECTIVE:  Patient seen and examined at bedside.  No overnight events.  Patient reports no new complaints at this time.  Denies flatus and BM.  Currently NPO.  Patient denies any fever, chills, chest pain, shortness of breath, nausea, vomiting, or urinary complaints.    VITALS  Vital Signs Last 24 Hrs  T(C): 36.7 (02 Aug 2024 08:17), Max: 36.7 (01 Aug 2024 17:21)  T(F): 98 (02 Aug 2024 08:17), Max: 98 (01 Aug 2024 17:21)  HR: 80 (02 Aug 2024 08:17) (74 - 92)  BP: 123/75 (02 Aug 2024 08:17) (123/75 - 175/90)  BP(mean): --  RR: 16 (02 Aug 2024 08:17) (16 - 19)  SpO2: 99% (02 Aug 2024 08:17) (97% - 99%)    Parameters below as of 02 Aug 2024 08:17  Patient On (Oxygen Delivery Method): room air    PHYSICAL EXAM  GENERAL: Male lying comfortably in bed in NAD.  HEENT:  NC/AT. Sclera white. Mucous membranes moist.  CARDIO:  Regular rate and rhythm.   RESPIRATORY:  Nonlabored breathing, no accessory muscle use.  ABDOMEN:  Soft, slightly distended, mid/ right sided tenderness. No rebound tenderness or guarding.  EXTREMITIES: No calf tenderness bilaterally.  SKIN:  No jaundice, pallor, or cyanosis  NEURO:  A&O x 3    MEDICATIONS  MEDICATIONS  (STANDING):  acetaminophen   IVPB .. 1000 milliGRAM(s) IV Intermittent once  acetaminophen   IVPB .. 1000 milliGRAM(s) IV Intermittent once  dextrose 5% + lactated ringers with potassium chloride 20 mEq/L 1000 milliLiter(s) (85 mL/Hr) IV Continuous <Continuous>  diatrizoate meglumine/diatrizoate sodium. 100 milliLiter(s) Oral once  heparin   Injectable 5000 Unit(s) SubCutaneous every 8 hours  pantoprazole  Injectable 40 milliGRAM(s) IV Push daily    MEDICATIONS  (PRN):  aluminum hydroxide/magnesium hydroxide/simethicone Suspension 30 milliLiter(s) Oral every 4 hours PRN Dyspepsia  melatonin 3 milliGRAM(s) Oral at bedtime PRN Insomnia  metoclopramide 5 milliGRAM(s) Oral three times a day before meals PRN Gastroparesis  ondansetron Injectable 4 milliGRAM(s) IV Push every 8 hours PRN Nausea and/or Vomiting      LABS:                        12.5   9.05  )-----------( 205      ( 02 Aug 2024 04:35 )             37.5     08-02    141  |  108  |  9   ----------------------------<  105<H>  4.4   |  31  |  1.20    Ca    9.0      02 Aug 2024 04:35  Phos  2.8     08-02  Mg     1.9     08-02    TPro  6.8  /  Alb  3.3  /  TBili  0.8  /  DBili  x   /  AST  26  /  ALT  41  /  AlkPhos  66  08-02    PT/INR - ( 02 Aug 2024 04:35 )   PT: 12.2 sec;   INR: 1.04 ratio         PTT - ( 02 Aug 2024 04:35 )  PTT:36.4 sec    Urinalysis with Rflx Culture (collected 02 Aug 2024 01:15)            ASSESSMENT & PLAN  59yM with PMHx of HTN, b/l PE (on eliquis), gastroperisis, Dobbins's esophagus, anemia, COPD, Depression, PTSD, and PSHx of fundoplication x 3 (repair of recurrent hiatal hernia), gastric pacemaker w/ Dr. Herrera 2018 and "sinus surgery" admitted for abdominal pain, CT with SBO.    - Continue NPO  - Refusing NGT unless sedated.  - WBC normalized this AM  - Serial abdominal exams  - DVT prophylaxis with HSQ  - OOB, pain control  - Gastrograffic challenge with small bowel series to begin now for therapeutic and diagnostic purposes  - Case discussed with Dr. Schaffer

## 2024-08-02 NOTE — PATIENT PROFILE ADULT - TRANSPORTATION
02/14/17          RETURN TO WORK    Tyra Mg  1973  [address]  [facility] [address]        This is to certify that Tyra Mg has been hospitalized at [facility] under my care from 2/12/2017 to 02/14/17and can return to work on 2/18/17.    RESTRICTIONS: None      Sincerely,      SIGNATURE: Ej Currie MD/Ej Currie MD  [facility] [place] [facility] [place]  [address]  Phone [phone]  FAX: [phone]  
no

## 2024-08-02 NOTE — ED ADULT NURSE REASSESSMENT NOTE - NS ED NURSE REASSESS COMMENT FT1
Patient moved from T3 to 4B at this time waiting for bed.
Pt received on stretcher resting, pt stated that his pain is manageable at this time, 5/10, A&Ox4, no labored breathing.

## 2024-08-02 NOTE — H&P ADULT - PROBLEM SELECTOR PLAN 1
Hx of gastroparesis, hiatal hernia s/p fundoplication x3, gastric pacemaker, Dobbins's esophagus presenting with worsening abdominal pain and distention for past 2 weeks. Last bm this AM and passing gas on admission.     -Admit to general medicine  -s/p 4mg IV Zofran x1, 1g IV Ofirmev x1, 1mg IV Dilaudid x2, 4mg IV morphine x1, 1L 0.9% NS bolus x1  - conservative management per surgery recs  - NPO  - D5 LR w/ 20meq KCl @85cc/hr  - Monitor electrolytes on daily BMP  - Pt refusing NGT placement d/t previous sinus surgery  - Aspiration precautions  - Serial abdominal exams  - 1g Ofirmev q8 PRN for mild pain  - avoid opiate pain management due to exacerbation of gastroparesis  - IV reglan PRN for gastroparesis; EKG qtc 423  - Zofran PRN for nausea  - surgery consulted in ED; appreciate recs  - GI consult (Dr. Modi); appreciate recs Hx of gastroparesis, hiatal hernia s/p fundoplication x3, gastric pacemaker, Dobbins's esophagus presenting with worsening abdominal pain and distention for past 2 weeks. Last bm this AM and passing gas on admission.   CTAP: There are dilated loops of fluid-filled small bowel measuring up to 3.7 cm. There is a transition point in the mid abdomen. *see full report*    -Admit to general medicine  -s/p 4mg IV Zofran x1, 1g IV Ofirmev x1, 1mg IV Dilaudid x2, 4mg IV morphine x1, 1L 0.9% NS bolus x1  - conservative management per surgery recs  - NPO  - D5 LR w/ 20meq KCl @85cc/hr  - Monitor electrolytes on daily BMP  - Pt refusing NGT placement d/t previous sinus surgery  - Aspiration precautions  - Serial abdominal exams  - 1g Ofirmev q8 PRN for mild pain  - avoid opiate pain management due to exacerbation of gastroparesis  - IV reglan PRN for gastroparesis; EKG qtc 423  - Zofran PRN for nausea  - surgery consulted in ED; appreciate recs  - GI consult (Dr. Modi); appreciate recs

## 2024-08-02 NOTE — H&P ADULT - PROBLEM SELECTOR PLAN 4
Per pt, unable to sleep for past 3 days requesting medication for sleep    - x1 time dose of 25mg IV benadryl for insomnia

## 2024-08-02 NOTE — H&P ADULT - ATTENDING COMMENTS
pt seen and examined at bedside   59 Y.O.M presented to ED due to abdominal pain and nausea , no vomiting   pt last BM was 1 small in am , not passing gas per him during my encounter but when resident interviewed stated passing gas   abd is mildly distended , BS hypoactive and generalized tenderness   prior abd surgeries , denied prior SBO  CT showed SBO  surgery evaluated pt recommended conservative measures for now   pt declined NGT as stated that he had nasal sinus surgery and would not put any NGT despite explaining benefit of it and risk of not inserting NGT , still refuse     # SBO  - strict NPO  - IVF  - aspiration precautions   - pain control PRN  - anti emetic PRN , monitor QTC  - serial abd exam , repeat X ray abd in am to f/u   - monitor electrolytes and replace as needed   - GI and surgery consulted , f/u further recommendations   - monitor for passing gas or having BM  pt on DVT ppx dose of eliquis not therapeutic will change to heparin SQ in case required surgical intervention     # leukocytosis likely reactive   UA is negative for infection   ordered  CXR to r/o aspiration , per personal reading no consolidation , f/u official reading   afebrile   monitor off Abx for now   monitor for fever and leukocytosis     rest per resident note

## 2024-08-02 NOTE — CONSULT NOTE ADULT - SUBJECTIVE AND OBJECTIVE BOX
SURGERY PA CONSULT NOTE:      HPI:  59yM with PMHx of HTN, b/l PE (on eliquis), gastroperisis, Dobbins's esophagus, anemia, COPD, Depression, PTSD, and PSHx of fundoplication x 3 (repair of recurrent hiatal hernia), gastric pacemaker w/ Dr. Herrera 2018 and "sinus surgery" coming in with 1 month of abdominal pain worsening over past 3 days, worse this morning. Reports he had intermittent abdominal pain for about a month with intermittent constipation that he manages with medication but for the past 3 days he had a nonradiating tearing pain in the lower abdomen that worsened this AM. Reports having a small BM this AM 2/2 to meds he uses for constipation, but denies passing gas. Reports always having nausea but denies vomiting d/t his condition. States he had a dilation 2 months ago and reports having an esophagram last week for work up for another dilation for ongoing pain. Patient follows with Dr. Lissette Fernandez outpatient for GI as well as Dr. Javier at [facility]. Denies fever, chills, SOB, CP or any other complaints at this time.      PAST MEDICAL & SURGICAL HISTORY:  HTN (hypertension)    Reactive airway disease    Asthma    GERD (gastroesophageal reflux disease)    Hiatal hernia    Diabetes mellitus    Anxiety  PTSD - [employer] responnder    Low testosterone in male    Osteoarthritis    Intervertebral disc disorder  bulging discs cervical and lumbar region    Shoulder pain, left    Depression    PE (pulmonary thromboembolism)    Insomnia    Gastroparesis    COVID-19 virus infection  h/o 4/2020    COVID-19 vaccine series completed    H/O submucous nasal surgery  septoplasty    S/P laparoscopic fundoplication  2x    S/P Achilles tendon repair  right - done 2x    S/P carpal tunnel release  right    S/P tonsillectomy and adenoidectomy    H/O hiatal hernia  repaired    Presence of gastric pacemaker  2018      REVIEW OF SYSTEMS:  General/Constitutional: No acute distress, no headache, weakness, fevers, or chills   HEENT: Denies auditory or visual changes/disturbances, no vertigo, no throat pain, no dysphagia    Respiratory: Denies SOB/dyspnea  Cardiac: Denies chest pain, palpitations  Abdomen: See HPI  Genitourinary: Denies urinary complaints      MEDICATIONS:  Home Medications:  Ambien CR 12.5 mg oral tablet, extended release: 1 tab(s) orally prn (02 Apr 2024 08:06)  Eliquis 2.5 mg oral tablet: 1 tab(s) orally 2 times a day (02 Apr 2024 08:06)  metoprolol:  (02 Apr 2024 08:06)  omeprazole 20 mg oral delayed release capsule: 1 cap(s) orally once a day (02 Apr 2024 08:06)  Spiriva Respimat 1.25 mcg/inh inhalation aerosol: 1 puff(s) inhaled once a day (02 Apr 2024 08:03)  Trintellix 10 mg oral tablet: 1 tab(s) orally once a day (02 Apr 2024 08:07)    MEDICATIONS  (STANDING):    MEDICATIONS  (PRN):      ALLERGIES:  No Known Allergies      FAMILY HISTORY:  Family history of diabetes mellitus (Father, Sibling)  father, brother and sister    Family history of other heart disease (Mother)  mother    Family history of prostate cancer (Father)  father - also colon ca  grandfather    FHx: renal failure (Father, Mother)    Family history of asthma (Sibling, Mother)      VITAL SIGNS:  Vital Signs Last 24 Hrs  T(C): 36.7 (01 Aug 2024 22:00), Max: 36.7 (01 Aug 2024 17:21)  T(F): 98 (01 Aug 2024 22:00), Max: 98 (01 Aug 2024 17:21)  HR: 92 (01 Aug 2024 22:00) (78 - 92)  BP: 150/74 (01 Aug 2024 22:00) (150/74 - 175/90)  RR: 19 (01 Aug 2024 22:00) (16 - 19)  SpO2: 97% (01 Aug 2024 22:00) (97% - 98%)    Parameters below as of 01 Aug 2024 22:00  Patient On (Oxygen Delivery Method): room air      PHYSICAL EXAM:  General: No acute distress, appears comfortable  HEENT: Normal cephalic/atraumatic, anicteric, conjunctiva-non injected and moist  Chest: Nonlabored respiration  Abdomen: softly distended with epigastric and right sided tenderness   Neuro: answering questions appropriately      LABS:                        13.5   13.12 )-----------( 249      ( 01 Aug 2024 18:25 )             39.4     08-01    140  |  105  |  14  ----------------------------<  133<H>  3.7   |  29  |  1.30    Ca    9.6      01 Aug 2024 18:25    TPro  7.5  /  Alb  4.0  /  TBili  0.6  /  DBili  x   /  AST  34  /  ALT  50  /  AlkPhos  74  08-01    PT/INR - ( 01 Aug 2024 18:25 )   PT: 12.2 sec;   INR: 1.04 ratio         PTT - ( 01 Aug 2024 18:25 )  PTT:36.0 sec  Urinalysis Basic - ( 01 Aug 2024 18:25 )    Color: x / Appearance: x / SG: x / pH: x  Gluc: 133 mg/dL / Ketone: x  / Bili: x / Urobili: x   Blood: x / Protein: x / Nitrite: x   Leuk Esterase: x / RBC: x / WBC x   Sq Epi: x / Non Sq Epi: x / Bacteria: x      LIVER FUNCTIONS - ( 01 Aug 2024 18:25 )  Alb: 4.0 g/dL / Pro: 7.5 g/dL / ALK PHOS: 74 U/L / ALT: 50 U/L / AST: 34 U/L / GGT: x               RADIOLOGY & ADDITIONAL STUDIES:  < from: CT Abdomen and Pelvis w/ Oral Cont and w/ IV Cont (08.01.24 @ 20:42) >  ACC: [id number] EXAM:  CT ABDOMEN AND PELVIS OC IC   ORDERED BY: TALIA LOPEZ     PROCEDURE DATE:  08/01/2024          INTERPRETATION:  CLINICAL INFORMATION: Abdominal pain nausea and vomiting    COMPARISON: CT chest abdomen pelvis 3/14/2022..    CONTRAST/COMPLICATIONS:  IV Contrast: Omnipaque 350  90 cc administered   10 cc discarded  Oral Contrast: Omnipaque 300  Complications: None reported at time of study completion    PROCEDURE:  CT of the Abdomen and Pelvis was performed.  [place] coronal reformats were performed.    FINDINGS:  LOWER CHEST: Within normal limits.    LIVER: Small left lobe cyst and other hypodensities too small to   characterize, unchanged.  BILE DUCTS: Normal caliber.  GALLBLADDER: Within normal limits.  SPLEEN: Within normal limits.  PANCREAS: Within normal limits.  ADRENALS: Within normal limits.  KIDNEYS/URETERS: Right renal cyst and other cortical hypodensity too   small to characterize.    BLADDER: Within normal limits.  REPRODUCTIVE ORGANS: Prostate within normal limits.    BOWEL: Status post hiatal hernia repair. Normal appendix. There are   dilated loops of fluid-filled small bowel measuring up to 3.7 cm. There   is a transition point in the mid abdomen (601:43) (301:73). No   significant bowel wall thickening. PERITONEUM/RETROPERITONEUM: There is a   gastric pacemaker device in the anterior right subcutaneous tissues with   the lead extending to the stomach. There is nearby short tubular   structure and another similar tubular structureseen in the pelvis which   are of uncertain etiology but unchanged. There is trace perisplenic   ascites and trace free fluid in the pelvis.  VESSELS: Within normal limits.  LYMPH NODES: No lymphadenopathy.  ABDOMINAL WALL: Within normal limits.  BONES: Degenerative changes.    IMPRESSION:    Small bowel obstruction as described above.    --- End of Report ---    ABUNDIO FAJARDO MD; Attending Radiologist  This document has been electronically signed. Aug  1 2024  9:24PM    < end of copied text >    
[facility]    Geoffrey Rony Sellers NP    [address] [place] [address] [phone]      Chief Complaint:  Patient is a 59y old  Male who presents with a chief complaint of SBO (02 Aug 2024 11:03)      HPI:Pt is a 60 yo M with PMH gastroparesis, hiatal hernia s/p fundoplication x3, gastric pacemaker, Dobbins's esophagus, HTN, diabetes, anemia, COPD, depression, and PTSD presents to the ED with worsening abdominal pain for the past several weeks. States he had a URI ~2 weeks ago and a significant cough after which he has had worsening abdominal pain particularly the last 2 days. Endorses tearing sensation and 10/10 pain in the abdomen starting yesterday AM. Has not had a bowel movement for the past 4 days so took miralax and had a small amount of loose stools this AM before the pain worsened. Is still passing gas. Endorsing nausea at baseline, however, worsened for the past 2 days.     Allergies:  No Known Allergies      Medications:  acetaminophen   IVPB .. 1000 milliGRAM(s) IV Intermittent once  aluminum hydroxide/magnesium hydroxide/simethicone Suspension 30 milliLiter(s) Oral every 4 hours PRN  dextrose 5% + lactated ringers with potassium chloride 20 mEq/L 1000 milliLiter(s) IV Continuous <Continuous>  heparin   Injectable 5000 Unit(s) SubCutaneous every 8 hours  melatonin 3 milliGRAM(s) Oral at bedtime PRN  metoclopramide 5 milliGRAM(s) Oral three times a day before meals PRN  ondansetron Injectable 4 milliGRAM(s) IV Push every 8 hours PRN  pantoprazole  Injectable 40 milliGRAM(s) IV Push daily      PMHX/PSHX:  HTN (hypertension)    Reactive airway disease    Asthma    GERD (gastroesophageal reflux disease)    Hiatal hernia    Diabetes mellitus    Anxiety    Low testosterone in male    Osteoarthritis    Intervertebral disc disorder    Rotator cuff tear, left    Shoulder pain, left    Depression    PE (pulmonary thromboembolism)    Insomnia    Gastroparesis    COVID-19 virus infection    COVID-19 vaccine series completed    H/O submucous nasal surgery    S/P laparoscopic fundoplication    S/P Achilles tendon repair    S/P carpal tunnel release    S/P tonsillectomy and adenoidectomy    H/O hiatal hernia    Presence of gastric pacemaker        Family history:  Family history of diabetes mellitus (Father, Sibling)    Family history of other heart disease (Mother)    Family history of prostate cancer (Father)    FHx: renal failure (Father, Mother)    Family history of asthma (Sibling, Mother)        Social History:     ROS:     General:  No wt loss, fevers, chills, night sweats, fatigue,   Eyes:  Good vision, no reported pain  ENT:  No sore throat, pain, runny nose, dysphagia  CV:  No pain, palpitations, hypo/hypertension  Resp:  No dyspnea, cough, tachypnea, wheezing  GI:  No pain, No nausea, No vomiting, No diarrhea, No constipation, No weight loss, No fever, No pruritis, No rectal bleeding, No tarry stools, No dysphagia,  :  No pain, bleeding, incontinence, nocturia  Muscle:  No pain, weakness  Neuro:  No weakness, tingling, memory problems  Psych:  No fatigue, insomnia, mood problems, depression  Endocrine:  No polyuria, polydipsia, cold/heat intolerance  Heme:  No petechiae, ecchymosis, easy bruisability  Skin:  No rash, tattoos, scars, edema      PHYSICAL EXAM:   Vital Signs:  Vital Signs Last 24 Hrs  T(C): 36.7 (02 Aug 2024 11:46), Max: 36.7 (01 Aug 2024 17:21)  T(F): 98.1 (02 Aug 2024 11:46), Max: 98.1 (02 Aug 2024 11:46)  HR: 76 (02 Aug 2024 11:46) (74 - 92)  BP: 129/66 (02 Aug 2024 11:46) (123/75 - 175/90)  BP(mean): --  RR: 18 (02 Aug 2024 11:46) (16 - 19)  SpO2: 98% (02 Aug 2024 11:46) (97% - 99%)    Parameters below as of 02 Aug 2024 08:17  Patient On (Oxygen Delivery Method): room air      Daily     Daily     GENERAL:  Appears stated age, well-groomed, well-nourished, no distress  HEENT:  NC/AT,  conjunctivae clear and pink, no thyromegaly, nodules, adenopathy, no JVD, sclera -anicteric  CHEST:  Full & symmetric excursion, no increased effort, breath sounds clear  HEART:  Regular rhythm, S1, S2, no murmur/rub/S3/S4, no abdominal bruit, no edema  ABDOMEN:  Soft, non-tender, non-distended, normoactive bowel sounds,  no masses ,no hepato-splenomegaly, no signs of chronic liver disease  EXTEREMITIES:  no cyanosis,clubbing or edema  SKIN:  No rash/erythema/ecchymoses/petechiae/wounds/abscess/warm/dry  NEURO:  Alert, oriented, no asterixis, no tremor, no encephalopathy    LABS:                        12.5   9.05  )-----------( 205      ( 02 Aug 2024 04:35 )             37.5     08-02    141  |  108  |  9   ----------------------------<  105<H>  4.4   |  31  |  1.20    Ca    9.0      02 Aug 2024 04:35  Phos  2.8     08-02  Mg     1.9     08-02    TPro  6.8  /  Alb  3.3  /  TBili  0.8  /  DBili  x   /  AST  26  /  ALT  41  /  AlkPhos  66  08-02    LIVER FUNCTIONS - ( 02 Aug 2024 04:35 )  Alb: 3.3 g/dL / Pro: 6.8 g/dL / ALK PHOS: 66 U/L / ALT: 41 U/L / AST: 26 U/L / GGT: x           PT/INR - ( 02 Aug 2024 04:35 )   PT: 12.2 sec;   INR: 1.04 ratio         PTT - ( 02 Aug 2024 04:35 )  PTT:36.4 sec  Urinalysis Basic - ( 02 Aug 2024 04:35 )    Color: x / Appearance: x / SG: x / pH: x  Gluc: 105 mg/dL / Ketone: x  / Bili: x / Urobili: x   Blood: x / Protein: x / Nitrite: x   Leuk Esterase: x / RBC: x / WBC x   Sq Epi: x / Non Sq Epi: x / Bacteria: x      Amylase Serum--      Lipase serum23       Ammonia--      Imaging:

## 2024-08-02 NOTE — H&P ADULT - ASSESSMENT
Pt is a 60 yo M with PMH gastroparesis, hiatal hernia s/p fundoplication x3, gastric pacemaker, Dobbins's esophagus, HTN, diabetes, anemia, COPD, depression, and PTSD admitted for SBO treatment

## 2024-08-02 NOTE — CARE COORDINATION ASSESSMENT. - NSPASTMEDSURGHISTORY_GEN_ALL_CORE_FT
PAST MEDICAL & SURGICAL HISTORY:  Shoulder pain, left      Intervertebral disc disorder  bulging discs cervical and lumbar region      Osteoarthritis      Low testosterone in male      Anxiety  PTSD - [employer] responnder      Diabetes mellitus      Hiatal hernia      GERD (gastroesophageal reflux disease)      Asthma      Reactive airway disease      HTN (hypertension)      S/P tonsillectomy and adenoidectomy      S/P carpal tunnel release  right      S/P Achilles tendon repair  right - done 2x      S/P laparoscopic fundoplication  2x      H/O submucous nasal surgery  septoplasty      Gastroparesis      Insomnia      PE (pulmonary thromboembolism)      Depression      H/O hiatal hernia  repaired      COVID-19 vaccine series completed      COVID-19 virus infection  h/o 4/2020      Presence of gastric pacemaker  2018

## 2024-08-02 NOTE — CARE COORDINATION ASSESSMENT. - OTHER PERTINENT REFERRAL INFORMATION
Sw met with Pt at bedside. Pt is A & O x4. Sw introduced self   and explained role. Pt lives w/ wife and son  has 1 COREEN and 12 steps to bedroom. The patient was mainly indep at his ADLS at home with assistance from his family. Pt has no Hx of HC, GREG or DME. PCP: Lucero Del Angel [phone] [facility]: [phone]

## 2024-08-02 NOTE — H&P ADULT - HISTORY OF PRESENT ILLNESS
Pt is a 58 yo M with PMH gastroparesis, hiatal hernia s/p fundoplication x3, gastric pacemaker, Dobbins's esophagus, HTN, diabetes, anemia, COPD, depression, and PTSD presents to the ED with worsening abdominal pain for the past several weeks. States he had a URI ~2 weeks ago and a significant cough after which he has had worsening abdominal pain particularly the last 2 days. Endorses tearing sensation and 10/10 pain in the abdomen starting yesterday AM. Has not had a bowel movement for the past 4 days so took miralax and had a small amount of loose stools this AM before the pain worsened. Is still passing gas. Endorsing nausea at baseline, however, worsened for the past 2 days. Denies fever, chills, vomiting.    Denies fever, chills, chest pain, palpitations, SOB, cough, vomiting, diarrhea, constipation, urinary frequency, urgency, or dysuria, headaches, changes in vision, dizziness, numbness, tingling.  Denies recent travel, recent antibiotic use, or sick contacts.    ED Course:   Vitals: BP: 175/50, HR: 78, Temp: 98, RR: 16, SpO2: 98% on   Labs:  WBC 13.12, H/H 13.5 39.4, Na 140, K 3.7  UA: Negative  CXR: Clear lungs as per personal read, official read pending   CT: Status post hiatal hernia repair. Normal appendix. There are   dilated loops of fluid-filled small bowel measuring up to 3.7 cm. There   is a transition point in the mid abdomen (601:43) (301:73). No   significant bowel wall thickening. PERITONEUM/RETROPERITONEUM: There is a   gastric pacemaker device in the anterior right subcutaneous tissues with   the lead extending to the stomach. There is nearby short tubular   structure and another similar tubular structureseen in the pelvis which   are of uncertain etiology but unchanged. There is trace perisplenic   ascites and trace free fluid in the pelvis.    EKG: NSR @ 83  Received in the ED: s/p 4mg IV Zofran x1, 1g IV Ofirmev x1, 1mg IV Dilaudid x2, 4mg IV morphine x1, 1L 0.9% NS bolus x1   Pt is a 60 yo M with PMH gastroparesis, hiatal hernia s/p fundoplication x3, gastric pacemaker, Dobbins's esophagus, HTN, diabetes, anemia, COPD, depression, and PTSD presents to the ED with worsening abdominal pain for the past several weeks. States he had a URI ~2 weeks ago and a significant cough after which he has had worsening abdominal pain particularly the last 2 days. Endorses tearing sensation and 10/10 pain in the abdomen starting yesterday AM. Has not had a bowel movement for the past 4 days so took miralax and had a small amount of loose stools this AM before the pain worsened. Is still passing gas. Endorsing nausea at baseline, however, worsened for the past 2 days.     Denies fever, chills, chest pain, palpitations, SOB, cough, vomiting, diarrhea, constipation, urinary frequency, urgency, or dysuria, headaches, changes in vision, dizziness, numbness, tingling.  Denies recent travel, recent antibiotic use, or sick contacts.    ED Course:   Vitals: BP: 175/50, HR: 78, Temp: 98, RR: 16, SpO2: 98% on   Labs:  WBC 13.12, H/H 13.5 39.4, Na 140, K 3.7  UA: Negative  CXR: Clear lungs as per personal read, official read pending   CT: Status post hiatal hernia repair. Normal appendix. There are   dilated loops of fluid-filled small bowel measuring up to 3.7 cm. There   is a transition point in the mid abdomen (601:43) (301:73). No   significant bowel wall thickening. PERITONEUM/RETROPERITONEUM: There is a   gastric pacemaker device in the anterior right subcutaneous tissues with   the lead extending to the stomach. There is nearby short tubular   structure and another similar tubular structureseen in the pelvis which   are of uncertain etiology but unchanged. There is trace perisplenic   ascites and trace free fluid in the pelvis.    EKG: NSR @ 83  Received in the ED: s/p 4mg IV Zofran x1, 1g IV Ofirmev x1, 1mg IV Dilaudid x2, 4mg IV morphine x1, 1L 0.9% NS bolus x1

## 2024-08-02 NOTE — H&P ADULT - NSHPREVIEWOFSYSTEMS_GEN_ALL_CORE
CONSTITUTIONAL: denies fever, chills, fatigue, weakness  HEENT: denies blurred vision, sore throat  SKIN: denies new lesions, rash  CARDIOVASCULAR: denies chest pain, chest pressure, palpitations  RESPIRATORY: denies shortness of breath, sputum production  GASTROINTESTINAL: +abdominal pain, +nausea; denies diarrhea, constipation, and vomiting  GENITOURINARY: denies dysuria, discharge  NEUROLOGICAL: denies numbness, headache, focal weakness  MUSCULOSKELETAL: denies new joint pain, muscle aches  HEMATOLOGIC: denies gross bleeding, bruising  LYMPHATICS: denies enlarged lymph nodes, extremity swelling

## 2024-08-02 NOTE — H&P ADULT - PROBLEM SELECTOR PLAN 5
History of LLE dvt and b/l PE in 2019 2/2 testosterone use per patient on home Eliquis 2.5mg BID    - hold home meds   - Heparin 5000U subq q8

## 2024-08-03 DIAGNOSIS — D72.829 ELEVATED WHITE BLOOD CELL COUNT, UNSPECIFIED: ICD-10-CM

## 2024-08-03 LAB
ALBUMIN SERPL ELPH-MCNC: 3.2 G/DL — LOW (ref 3.3–5)
ALP SERPL-CCNC: 60 U/L — SIGNIFICANT CHANGE UP (ref 40–120)
ALT FLD-CCNC: 39 U/L — SIGNIFICANT CHANGE UP (ref 12–78)
ANION GAP SERPL CALC-SCNC: 6 MMOL/L — SIGNIFICANT CHANGE UP (ref 5–17)
AST SERPL-CCNC: 29 U/L — SIGNIFICANT CHANGE UP (ref 15–37)
BILIRUB SERPL-MCNC: 0.7 MG/DL — SIGNIFICANT CHANGE UP (ref 0.2–1.2)
BUN SERPL-MCNC: 8 MG/DL — SIGNIFICANT CHANGE UP (ref 7–23)
CALCIUM SERPL-MCNC: 8.7 MG/DL — SIGNIFICANT CHANGE UP (ref 8.5–10.1)
CHLORIDE SERPL-SCNC: 107 MMOL/L — SIGNIFICANT CHANGE UP (ref 96–108)
CO2 SERPL-SCNC: 30 MMOL/L — SIGNIFICANT CHANGE UP (ref 22–31)
CREAT SERPL-MCNC: 1.2 MG/DL — SIGNIFICANT CHANGE UP (ref 0.5–1.3)
EGFR: 70 ML/MIN/1.73M2 — SIGNIFICANT CHANGE UP
GLUCOSE SERPL-MCNC: 106 MG/DL — HIGH (ref 70–99)
POTASSIUM SERPL-MCNC: 3.6 MMOL/L — SIGNIFICANT CHANGE UP (ref 3.5–5.3)
POTASSIUM SERPL-SCNC: 3.6 MMOL/L — SIGNIFICANT CHANGE UP (ref 3.5–5.3)
PROT SERPL-MCNC: 6.3 G/DL — SIGNIFICANT CHANGE UP (ref 6–8.3)
SODIUM SERPL-SCNC: 143 MMOL/L — SIGNIFICANT CHANGE UP (ref 135–145)

## 2024-08-03 PROCEDURE — 99232 SBSQ HOSP IP/OBS MODERATE 35: CPT

## 2024-08-03 RX ORDER — SODIUM CHLORIDE AND POTASSIUM CHLORIDE 150; 450 MG/100ML; MG/100ML
1000 INJECTION, SOLUTION INTRAVENOUS
Refills: 0 | Status: DISCONTINUED | OUTPATIENT
Start: 2024-08-03 | End: 2024-08-04

## 2024-08-03 RX ORDER — GUAIFEN/P-PROPANOLAMIN/CODEINE
5 EXPECTORANT ORAL ONCE
Refills: 0 | Status: DISCONTINUED | OUTPATIENT
Start: 2024-08-03 | End: 2024-08-03

## 2024-08-03 RX ORDER — ACETAMINOPHEN 500 MG
1000 TABLET ORAL ONCE
Refills: 0 | Status: COMPLETED | OUTPATIENT
Start: 2024-08-03 | End: 2024-08-03

## 2024-08-03 RX ORDER — SENNOSIDES 8.6 MG/1
2 TABLET ORAL AT BEDTIME
Refills: 0 | Status: DISCONTINUED | OUTPATIENT
Start: 2024-08-03 | End: 2024-08-06

## 2024-08-03 RX ADMIN — Medication 3 MILLIGRAM(S): at 21:06

## 2024-08-03 RX ADMIN — PANTOPRAZOLE SODIUM 40 MILLIGRAM(S): 20 TABLET, DELAYED RELEASE ORAL at 11:52

## 2024-08-03 RX ADMIN — HEPARIN SODIUM 5000 UNIT(S): 1000 INJECTION, SOLUTION INTRAVENOUS; SUBCUTANEOUS at 21:06

## 2024-08-03 RX ADMIN — HEPARIN SODIUM 5000 UNIT(S): 1000 INJECTION, SOLUTION INTRAVENOUS; SUBCUTANEOUS at 05:45

## 2024-08-03 RX ADMIN — Medication 5 MILLIGRAM(S): at 21:12

## 2024-08-03 RX ADMIN — HEPARIN SODIUM 5000 UNIT(S): 1000 INJECTION, SOLUTION INTRAVENOUS; SUBCUTANEOUS at 14:00

## 2024-08-03 RX ADMIN — Medication 400 MILLIGRAM(S): at 21:06

## 2024-08-03 RX ADMIN — SODIUM CHLORIDE AND POTASSIUM CHLORIDE 75 MILLILITER(S): 150; 450 INJECTION, SOLUTION INTRAVENOUS at 11:51

## 2024-08-03 RX ADMIN — Medication 1000 MILLIGRAM(S): at 21:36

## 2024-08-03 NOTE — PROGRESS NOTE ADULT - SUBJECTIVE AND OBJECTIVE BOX
CC/F/U for: sbo    HPI:  Pt is a 60 yo M with PMH gastroparesis, hiatal hernia s/p fundoplication x3, gastric pacemaker, Dobbins's esophagus, HTN, diabetes, anemia, COPD, depression, and PTSD presents to the ED with worsening abdominal pain for the past several weeks. States he had a URI ~2 weeks ago and a significant cough after which he has had worsening abdominal pain particularly the last 2 days. Endorses tearing sensation and 10/10 pain in the abdomen starting yesterday AM. Has not had a bowel movement for the past 4 days so took miralax and had a small amount of loose stools this AM before the pain worsened. Is still passing gas. Endorsing nausea at baseline, however, worsened for the past 2 days.     Denies fever, chills, chest pain, palpitations, SOB, cough, vomiting, diarrhea, constipation, urinary frequency, urgency, or dysuria, headaches, changes in vision, dizziness, numbness, tingling.  Denies recent travel, recent antibiotic use, or sick contacts.    ED Course:   Vitals: BP: 175/50, HR: 78, Temp: 98, RR: 16, SpO2: 98% on   Labs:  WBC 13.12, H/H 13.5 39.4, Na 140, K 3.7  UA: Negative  CXR: Clear lungs as per personal read, official read pending   CT: Status post hiatal hernia repair. Normal appendix. There are   dilated loops of fluid-filled small bowel measuring up to 3.7 cm. There   is a transition point in the mid abdomen (601:43) (301:73). No   significant bowel wall thickening. PERITONEUM/RETROPERITONEUM: There is a   gastric pacemaker device in the anterior right subcutaneous tissues with   the lead extending to the stomach. There is nearby short tubular   structure and another similar tubular structureseen in the pelvis which   are of uncertain etiology but unchanged. There is trace perisplenic   ascites and trace free fluid in the pelvis.    EKG: NSR @ 83  Received in the ED: s/p 4mg IV Zofran x1, 1g IV Ofirmev x1, 1mg IV Dilaudid x2, 4mg IV morphine x1, 1L 0.9% NS bolus x1   (02 Aug 2024 02:21)        INTERVAL HPI/OVERNIGHT EVENTS:  Pt seen and examined at bedside - still refusing NGT; reports abd pain less, nausea decreased; .     Allergies/Intolerance: No Known Allergies      MEDICATIONS  (STANDING):  dextrose 5% + lactated ringers with potassium chloride 20 mEq/L 1000 milliLiter(s) (75 mL/Hr) IV Continuous <Continuous>  heparin   Injectable 5000 Unit(s) SubCutaneous every 8 hours  pantoprazole  Injectable 40 milliGRAM(s) IV Push daily    MEDICATIONS  (PRN):  aluminum hydroxide/magnesium hydroxide/simethicone Suspension 30 milliLiter(s) Oral every 4 hours PRN Dyspepsia  melatonin 3 milliGRAM(s) Oral at bedtime PRN Insomnia  metoclopramide 5 milliGRAM(s) Oral three times a day before meals PRN Gastroparesis  ondansetron Injectable 4 milliGRAM(s) IV Push every 8 hours PRN Nausea and/or Vomiting        ROS: as above; all other systems reviewed and wnl      PHYSICAL EXAMINATION:  Vital Signs Last 24 Hrs  T(C): 36.7 (03 Aug 2024 05:19), Max: 37 (02 Aug 2024 15:53)  T(F): 98.1 (03 Aug 2024 05:19), Max: 98.6 (02 Aug 2024 15:53)  HR: 62 (03 Aug 2024 05:19) (62 - 67)  BP: 114/66 (03 Aug 2024 05:19) (114/66 - 163/84)  BP(mean): --  RR: 18 (03 Aug 2024 05:19) (16 - 18)  SpO2: 95% (03 Aug 2024 05:19) (95% - 100%)    Parameters below as of 03 Aug 2024 05:19  Patient On (Oxygen Delivery Method): room air      CAPILLARY BLOOD GLUCOSE          GENERAL: NAD  HEENT: mucous membranes dry  RESP: respirations unlabored  HEART: HR s  ABDOMEN: soft, ND, NT   EXTREMITIES:  no edema b/l LEs, no calf tenderness  NEURO: awake, alert, interactive; moves all extremities      LABS:                        12.5   9.05  )-----------( 205      ( 02 Aug 2024 04:35 )             37.5     08-03    143  |  107  |  8   ----------------------------<  106<H>  3.6   |  30  |  1.20    Ca    8.7      03 Aug 2024 06:15  Phos  2.8     08-02  Mg     1.9     08-02    TPro  6.3  /  Alb  3.2<L>  /  TBili  0.7  /  DBili  x   /  AST  29  /  ALT  39  /  AlkPhos  60  08-03    PT/INR - ( 02 Aug 2024 04:35 )   PT: 12.2 sec;   INR: 1.04 ratio         PTT - ( 02 Aug 2024 04:35 )  PTT:36.4 sec  Urinalysis Basic - ( 03 Aug 2024 06:15 )    Color: x / Appearance: x / SG: x / pH: x  Gluc: 106 mg/dL / Ketone: x  / Bili: x / Urobili: x   Blood: x / Protein: x / Nitrite: x   Leuk Esterase: x / RBC: x / WBC x   Sq Epi: x / Non Sq Epi: x / Bacteria: x          RADIOLOGY & ADDITIONAL TESTS:      ASSESSMENT AND PLAN:  59y Male CC/F/U for: sbo    HPI:  Pt is a 60 yo M with PMH gastroparesis, hiatal hernia s/p fundoplication x3, gastric pacemaker, Dobbins's esophagus, HTN, diabetes, anemia, COPD, depression, and PTSD presents to the ED with worsening abdominal pain for the past several weeks. States he had a URI ~2 weeks ago and a significant cough after which he has had worsening abdominal pain particularly the last 2 days. Endorses tearing sensation and 10/10 pain in the abdomen starting yesterday AM. Has not had a bowel movement for the past 4 days so took miralax and had a small amount of loose stools this AM before the pain worsened. Is still passing gas. Endorsing nausea at baseline, however, worsened for the past 2 days.     Denies fever, chills, chest pain, palpitations, SOB, cough, vomiting, diarrhea, constipation, urinary frequency, urgency, or dysuria, headaches, changes in vision, dizziness, numbness, tingling.  Denies recent travel, recent antibiotic use, or sick contacts.    ED Course:   Vitals: BP: 175/50, HR: 78, Temp: 98, RR: 16, SpO2: 98% on   Labs:  WBC 13.12, H/H 13.5 39.4, Na 140, K 3.7  UA: Negative  CXR: Clear lungs as per personal read, official read pending   CT: Status post hiatal hernia repair. Normal appendix. There are   dilated loops of fluid-filled small bowel measuring up to 3.7 cm. There   is a transition point in the mid abdomen (601:43) (301:73). No   significant bowel wall thickening. PERITONEUM/RETROPERITONEUM: There is a   gastric pacemaker device in the anterior right subcutaneous tissues with   the lead extending to the stomach. There is nearby short tubular   structure and another similar tubular structureseen in the pelvis which   are of uncertain etiology but unchanged. There is trace perisplenic   ascites and trace free fluid in the pelvis.    EKG: NSR @ 83  Received in the ED: s/p 4mg IV Zofran x1, 1g IV Ofirmev x1, 1mg IV Dilaudid x2, 4mg IV morphine x1, 1L 0.9% NS bolus x1   (02 Aug 2024 02:21)        INTERVAL HPI/OVERNIGHT EVENTS:  Pt seen and examined at bedside - still refusing NGT; reports abd pain less, nausea decreased; reports some flatus, no BM    Allergies/Intolerance: No Known Allergies      MEDICATIONS  (STANDING):  dextrose 5% + lactated ringers with potassium chloride 20 mEq/L 1000 milliLiter(s) (75 mL/Hr) IV Continuous <Continuous>  heparin   Injectable 5000 Unit(s) SubCutaneous every 8 hours  pantoprazole  Injectable 40 milliGRAM(s) IV Push daily    MEDICATIONS  (PRN):  aluminum hydroxide/magnesium hydroxide/simethicone Suspension 30 milliLiter(s) Oral every 4 hours PRN Dyspepsia  melatonin 3 milliGRAM(s) Oral at bedtime PRN Insomnia  metoclopramide 5 milliGRAM(s) Oral three times a day before meals PRN Gastroparesis  ondansetron Injectable 4 milliGRAM(s) IV Push every 8 hours PRN Nausea and/or Vomiting      ROS: as above; all other systems reviewed and wnl      PHYSICAL EXAMINATION:  Vital Signs Last 24 Hrs  T(C): 36.7 (03 Aug 2024 05:19), Max: 37 (02 Aug 2024 15:53)  T(F): 98.1 (03 Aug 2024 05:19), Max: 98.6 (02 Aug 2024 15:53)  HR: 62 (03 Aug 2024 05:19) (62 - 67)  BP: 114/66 (03 Aug 2024 05:19) (114/66 - 163/84)  RR: 18 (03 Aug 2024 05:19) (16 - 18)  SpO2: 95% (03 Aug 2024 05:19) (95% - 100%)    Parameters below as of 03 Aug 2024 05:19  Patient On (Oxygen Delivery Method): room air      GENERAL: middle-aged male, in NAD  HEENT: mucous membranes dry  RESP: respirations unlabored  HEART: HR 60s  ABDOMEN: soft, ND, mild ttp, no rebound/guarding   EXTREMITIES:  no edema b/l LEs, no calf tenderness  NEURO: awake, alert, interactive; moves all extremities      LABS:                        12.5   9.05  )-----------( 205      ( 02 Aug 2024 04:35 )             37.5     08-03    143  |  107  |  8   ----------------------------<  106<H>  3.6   |  30  |  1.20    Ca    8.7      03 Aug 2024 06:15  Phos  2.8     08-02  Mg     1.9     08-02    TPro  6.3  /  Alb  3.2<L>  /  TBili  0.7  /  DBili  x   /  AST  29  /  ALT  39  /  AlkPhos  60  08-03    PT/INR - ( 02 Aug 2024 04:35 )   PT: 12.2 sec;   INR: 1.04 ratio         PTT - ( 02 Aug 2024 04:35 )  PTT:36.4 sec  Urinalysis Basic - ( 03 Aug 2024 06:15 )    Color: x / Appearance: x / SG: x / pH: x  Gluc: 106 mg/dL / Ketone: x  / Bili: x / Urobili: x   Blood: x / Protein: x / Nitrite: x   Leuk Esterase: x / RBC: x / WBC x   Sq Epi: x / Non Sq Epi: x / Bacteria: x

## 2024-08-03 NOTE — PROGRESS NOTE ADULT - NS ATTEND AMEND GEN_ALL_CORE FT
Covering for Dr Schaffer and Sharon  Pt seen and feeling well.  Tolerating diet.  Feels a bit bloated but clinically less distended.  Plan for Gastric Stimulater generator replacement on Monday.

## 2024-08-03 NOTE — PROGRESS NOTE ADULT - SUBJECTIVE AND OBJECTIVE BOX
[facility]    Geoffrey Rony Sellers NP    [address] [place] [address] [phone]    INTERVAL HPI/ OVERNIGHT EVENTS:  Pt s/e, Gastografin study noted, on full liq. diet  Reports feeling bloated after eating, +flatus    Allergies:  No Known Allergies    MEDICATIONS  (STANDING):  dextrose 5% + lactated ringers with potassium chloride 20 mEq/L 1000 milliLiter(s) (85 mL/Hr) IV Continuous <Continuous>  dextrose 5% + lactated ringers with potassium chloride 20 mEq/L 1000 milliLiter(s) (75 mL/Hr) IV Continuous <Continuous>  heparin   Injectable 5000 Unit(s) SubCutaneous every 8 hours  pantoprazole  Injectable 40 milliGRAM(s) IV Push daily    MEDICATIONS  (PRN):  aluminum hydroxide/magnesium hydroxide/simethicone Suspension 30 milliLiter(s) Oral every 4 hours PRN Dyspepsia  melatonin 3 milliGRAM(s) Oral at bedtime PRN Insomnia  metoclopramide 5 milliGRAM(s) Oral three times a day before meals PRN Gastroparesis  ondansetron Injectable 4 milliGRAM(s) IV Push every 8 hours PRN Nausea and/or Vomiting        PMHX/PSHX:  HTN (hypertension)    Reactive airway disease    Asthma    GERD (gastroesophageal reflux disease)    Hiatal hernia    Diabetes mellitus    Anxiety    Low testosterone in male    Osteoarthritis    Intervertebral disc disorder    Rotator cuff tear, left    Shoulder pain, left    Depression    PE (pulmonary thromboembolism)    Insomnia    Gastroparesis    COVID-19 virus infection    COVID-19 vaccine series completed    H/O submucous nasal surgery    S/P laparoscopic fundoplication    S/P Achilles tendon repair    S/P carpal tunnel release    S/P tonsillectomy and adenoidectomy    H/O hiatal hernia    Presence of gastric pacemaker        Family history:  Family history of diabetes mellitus (Father, Sibling)    Family history of other heart disease (Mother)    Family history of prostate cancer (Father)    FHx: renal failure (Father, Mother)    Family history of asthma (Sibling, Mother)        Social History:     ROS:     General:  No wt loss, fevers, chills, night sweats, fatigue,   Eyes:  Good vision, no reported pain  ENT:  No sore throat, pain, runny nose, dysphagia  CV:  No pain, palpitations, hypo/hypertension  Resp:  No dyspnea, cough, tachypnea, wheezing  GI:  No pain, No nausea, No vomiting, No diarrhea, No constipation, No weight loss, No fever, No pruritis, No rectal bleeding, No tarry stools, No dysphagia,  :  No pain, bleeding, incontinence, nocturia  Muscle:  No pain, weakness  Neuro:  No weakness, tingling, memory problems  Psych:  No fatigue, insomnia, mood problems, depression  Endocrine:  No polyuria, polydipsia, cold/heat intolerance  Heme:  No petechiae, ecchymosis, easy bruisability  Skin:  No rash, tattoos, scars, edema      PHYSICAL EXAM:   Vital Signs Last 24 Hrs  T(C): 36.7 (03 Aug 2024 05:19), Max: 37 (02 Aug 2024 15:53)  T(F): 98.1 (03 Aug 2024 05:19), Max: 98.6 (02 Aug 2024 15:53)  HR: 62 (03 Aug 2024 05:19) (62 - 76)  BP: 114/66 (03 Aug 2024 05:19) (114/66 - 163/84)  BP(mean): --  RR: 18 (03 Aug 2024 05:19) (16 - 18)  SpO2: 95% (03 Aug 2024 05:19) (95% - 100%)    Parameters below as of 03 Aug 2024 05:19  Patient On (Oxygen Delivery Method): room air        Daily     Daily     GENERAL:  Appears stated age, well-groomed, well-nourished, no distress  HEENT:  NC/AT,  conjunctivae clear and pink, no thyromegaly, nodules, adenopathy, no JVD, sclera -anicteric  CHEST:  Full & symmetric excursion, no increased effort, breath sounds clear  HEART:  Regular rhythm, S1, S2, no murmur/rub/S3/S4, no abdominal bruit, no edema  ABDOMEN:  Soft, non-tender, non-distended, normoactive bowel sounds,  no masses ,no hepato-splenomegaly, no signs of chronic liver disease  EXTEREMITIES:  no cyanosis,clubbing or edema  SKIN:  No rash/erythema/ecchymoses/petechiae/wounds/abscess/warm/dry  NEURO:  Alert, oriented, no asterixis, no tremor, no encephalopathy    LABS:                        12.5   9.05  )-----------( 205      ( 02 Aug 2024 04:35 )             37.5                      08-03    143  |  107  |  8   ----------------------------<  106<H>  3.6   |  30  |  1.20    Ca    8.7      03 Aug 2024 06:15  Phos  2.8     08-02  Mg     1.9     08-02    TPro  6.3  /  Alb  3.2<L>  /  TBili  0.7  /  DBili  x   /  AST  29  /  ALT  39  /  AlkPhos  60  08-03        LIVER FUNCTIONS - ( 02 Aug 2024 04:35 )  Alb: 3.3 g/dL / Pro: 6.8 g/dL / ALK PHOS: 66 U/L / ALT: 41 U/L / AST: 26 U/L / GGT: x           PT/INR - ( 02 Aug 2024 04:35 )   PT: 12.2 sec;   INR: 1.04 ratio         PTT - ( 02 Aug 2024 04:35 )  PTT:36.4 sec  Urinalysis Basic - ( 02 Aug 2024 04:35 )    Color: x / Appearance: x / SG: x / pH: x  Gluc: 105 mg/dL / Ketone: x  / Bili: x / Urobili: x   Blood: x / Protein: x / Nitrite: x   Leuk Esterase: x / RBC: x / WBC x   Sq Epi: x / Non Sq Epi: x / Bacteria: x      Amylase Serum--      Lipase serum23       Ammonia--      Imaging:

## 2024-08-03 NOTE — PROGRESS NOTE ADULT - SUBJECTIVE AND OBJECTIVE BOX
GENERAL SURGERY PROGRESS NOTE    S: Patient seen and examined at bedside.  No acute overnight events. Admits to small amounts of flatus, no bowel movements as of yet. Just tried going to bathroom before encounter but states nothing came out. Still feeling mild abdominal pain and cramping that he attributes to gas. Voiding, ambulating and tolerating clear liquid diet. Patient denies any fever, chills, chest pain, shortness of breath, nausea, vomiting, or urinary complaints.    MEDICATIONS:  aluminum hydroxide/magnesium hydroxide/simethicone Suspension 30 milliLiter(s) Oral every 4 hours PRN  dextrose 5% + lactated ringers with potassium chloride 20 mEq/L 1000 milliLiter(s) IV Continuous <Continuous>  heparin   Injectable 5000 Unit(s) SubCutaneous every 8 hours  melatonin 3 milliGRAM(s) Oral at bedtime PRN  metoclopramide 5 milliGRAM(s) Oral three times a day before meals PRN  ondansetron Injectable 4 milliGRAM(s) IV Push every 8 hours PRN  pantoprazole  Injectable 40 milliGRAM(s) IV Push daily      O:  Vital Signs Last 24 Hrs  T(C): 36.7 (03 Aug 2024 05:19), Max: 37 (02 Aug 2024 15:53)  T(F): 98.1 (03 Aug 2024 05:19), Max: 98.6 (02 Aug 2024 15:53)  HR: 62 (03 Aug 2024 05:19) (62 - 76)  BP: 114/66 (03 Aug 2024 05:19) (114/66 - 163/84)  BP(mean): --  RR: 18 (03 Aug 2024 05:19) (16 - 18)  SpO2: 95% (03 Aug 2024 05:19) (95% - 100%)    Parameters below as of 03 Aug 2024 05:19  Patient On (Oxygen Delivery Method): room air        PHYSICAL EXAM:  GENERAL: No acute distress, lying comfortably in bed  HEAD:  Atraumatic, Normocephalic  CHEST/LUNG: Non labored respirations, no accessory muscle use. CTAB; No wheezes, rales, or rhonchi  HEART: Regular rate and rhythm; No murmurs, rubs, or gallops  ABDOMEN: Soft, non-tender, non-distended; bowel sounds+  EXT: calves non-tender b/l, no edema  NEUROLOGY: A&O x 3, no focal deficits    I&O SUMMARY:    08-02-24 @ 07:01  -  08-03-24 @ 07:00  --------------------------------------------------------  IN:    dextrose 5% + lactated ringers w/ Additives: 255 mL    IV PiggyBack: 100 mL  Total IN: 355 mL    OUT:  Total OUT: 0 mL    Total NET: 355 mL          LABS:                        12.5   9.05  )-----------( 205      ( 02 Aug 2024 04:35 )             37.5     08-02    141  |  108  |  9   ----------------------------<  105<H>  4.4   |  31  |  1.20    Ca    9.0      02 Aug 2024 04:35  Phos  2.8     08-02  Mg     1.9     08-02    TPro  6.8  /  Alb  3.3  /  TBili  0.8  /  DBili  x   /  AST  26  /  ALT  41  /  AlkPhos  66  08-02    PT/INR - ( 02 Aug 2024 04:35 )   PT: 12.2 sec;   INR: 1.04 ratio         PTT - ( 02 Aug 2024 04:35 )  PTT:36.4 sec      Lactate, Blood: 1.2 mmol/L (08-01 @ 22:00)        RADIOLOGY:  < from: CT Abdomen and Pelvis w/ Oral Cont and w/ IV Cont (08.01.24 @ 20:42) >    ACC: [id number] EXAM:  CT ABDOMEN AND PELVIS OC IC   ORDERED BY: TALIA LOPEZ     PROCEDURE DATE:  08/01/2024          INTERPRETATION:  CLINICAL INFORMATION: Abdominal pain nausea and vomiting    COMPARISON: CT chest abdomen pelvis 3/14/2022..    CONTRAST/COMPLICATIONS:  IV Contrast: Omnipaque 350  90 cc administered   10 cc discarded  Oral Contrast: Omnipaque 300  Complications: None reported at time of study completion    PROCEDURE:  CT of the Abdomen and Pelvis was performed.  [place] coronal reformats were performed.    FINDINGS:  LOWER CHEST: Within normal limits.    LIVER: Small left lobe cyst and other hypodensities too small to   characterize, unchanged.  BILE DUCTS: Normal caliber.  GALLBLADDER: Within normal limits.  SPLEEN: Within normal limits.  PANCREAS: Within normal limits.  ADRENALS: Within normal limits.  KIDNEYS/URETERS: Right renal cyst and other cortical hypodensity too   small to characterize.    BLADDER: Within normal limits.  REPRODUCTIVE ORGANS: Prostate within normal limits.    BOWEL: Status post hiatal hernia repair. Normal appendix. There are   dilated loops of fluid-filled small bowel measuring up to 3.7 cm. There   is a transition point in the mid abdomen (601:43) (301:73). No   significant bowel wall thickening. PERITONEUM/RETROPERITONEUM: There is a   gastric pacemaker device in the anterior right subcutaneous tissues with   the lead extending to the stomach. There is nearby short tubular   structure and another similar tubular structureseen in the pelvis which   are of uncertain etiology but unchanged. There is trace perisplenic   ascites and trace free fluid in the pelvis.  VESSELS: Within normal limits.  LYMPH NODES: No lymphadenopathy.  ABDOMINAL WALL: Within normal limits.  BONES: Degenerative changes.    IMPRESSION:    Small bowel obstruction as described above.      --- End of Report ---    < end of copied text >    ASSESSMENT:  59yM with PMHx of HTN, b/l PE (on eliquis), gastroparesis, Dobbins's esophagus, anemia, COPD, Depression, PTSD, and PSHx of fundoplication x 3 (repair of recurrent hiatal hernia), gastric pacemaker w/ Dr. Herrera 2018 and "sinus surgery" admitted for abdominal pain, CT with SBO. Patient reports small amount of flatus, no BM. Tolerating diet. Tentative plan to replace gastric pacemaker battery on Monday, 8/5.     PLAN:  - Advance diet to fulls, continue to adv as tolerated.  - Refusing NGT unless sedated.  - f/u AM labs  - Serial abdominal exams  - DVT prophylaxis with HSQ  - OOB, pain control    Case to be discussed with Dr. Ogden (covering for Dr. Schaffer)

## 2024-08-04 LAB
ABO RH CONFIRMATION: SIGNIFICANT CHANGE UP
APTT BLD: 52.9 SEC — HIGH (ref 24.5–35.6)
INR BLD: 1.08 RATIO — SIGNIFICANT CHANGE UP (ref 0.85–1.18)
PROTHROM AB SERPL-ACNC: 12.6 SEC — SIGNIFICANT CHANGE UP (ref 9.5–13)

## 2024-08-04 PROCEDURE — 99231 SBSQ HOSP IP/OBS SF/LOW 25: CPT

## 2024-08-04 PROCEDURE — 99232 SBSQ HOSP IP/OBS MODERATE 35: CPT

## 2024-08-04 RX ORDER — HEPARIN SODIUM 1000 [USP'U]/ML
5000 INJECTION, SOLUTION INTRAVENOUS; SUBCUTANEOUS EVERY 12 HOURS
Refills: 0 | Status: DISCONTINUED | OUTPATIENT
Start: 2024-08-04 | End: 2024-08-05

## 2024-08-04 RX ORDER — GUAIFEN/P-PROPANOLAMIN/CODEINE
5 EXPECTORANT ORAL ONCE
Refills: 0 | Status: DISCONTINUED | OUTPATIENT
Start: 2024-08-04 | End: 2024-08-04

## 2024-08-04 RX ORDER — CEFAZOLIN SODIUM 10 G
2000 VIAL (EA) INJECTION ONCE
Refills: 0 | Status: DISCONTINUED | OUTPATIENT
Start: 2024-08-05 | End: 2024-08-05

## 2024-08-04 RX ADMIN — HEPARIN SODIUM 5000 UNIT(S): 1000 INJECTION, SOLUTION INTRAVENOUS; SUBCUTANEOUS at 21:35

## 2024-08-04 RX ADMIN — SENNOSIDES 2 TABLET(S): 8.6 TABLET ORAL at 21:35

## 2024-08-04 RX ADMIN — Medication 5 MILLIGRAM(S): at 21:35

## 2024-08-04 RX ADMIN — Medication 3 MILLIGRAM(S): at 21:35

## 2024-08-04 RX ADMIN — PANTOPRAZOLE SODIUM 40 MILLIGRAM(S): 20 TABLET, DELAYED RELEASE ORAL at 12:03

## 2024-08-04 RX ADMIN — HEPARIN SODIUM 5000 UNIT(S): 1000 INJECTION, SOLUTION INTRAVENOUS; SUBCUTANEOUS at 05:28

## 2024-08-04 NOTE — DIETITIAN INITIAL EVALUATION ADULT - PROBLEM SELECTOR PLAN 1
Hx of gastroparesis, hiatal hernia s/p fundoplication x3, gastric pacemaker, Dobbins's esophagus presenting with worsening abdominal pain and distention for past 2 weeks. Last bm this AM and passing gas on admission.   CTAP: There are dilated loops of fluid-filled small bowel measuring up to 3.7 cm. There is a transition point in the mid abdomen. *see full report*    -Admit to general medicine  -s/p 4mg IV Zofran x1, 1g IV Ofirmev x1, 1mg IV Dilaudid x2, 4mg IV morphine x1, 1L 0.9% NS bolus x1  - conservative management per surgery recs  - NPO  - D5 LR w/ 20meq KCl @85cc/hr  - Monitor electrolytes on daily BMP  - Pt refusing NGT placement d/t previous sinus surgery  - Aspiration precautions  - Serial abdominal exams  - 1g Ofirmev q8 PRN for mild pain  - avoid opiate pain management due to exacerbation of gastroparesis  - IV reglan PRN for gastroparesis; EKG qtc 423  - Zofran PRN for nausea  - surgery consulted in ED; appreciate recs  - GI consult (Dr. Modi); appreciate recs

## 2024-08-04 NOTE — DIETITIAN INITIAL EVALUATION ADULT - PERTINENT LABORATORY DATA
08-03    143  |  107  |  8   ----------------------------<  106<H>  3.6   |  30  |  1.20    Ca    8.7      03 Aug 2024 06:15    TPro  6.3  /  Alb  3.2<L>  /  TBili  0.7  /  DBili  x   /  AST  29  /  ALT  39  /  AlkPhos  60  08-03  A1C with Estimated Average Glucose Result: 5.7 % (08-16-23 @ 17:00)

## 2024-08-04 NOTE — DIETITIAN NUTRITION RISK NOTIFICATION - TREATMENT: THE FOLLOWING DIET HAS BEEN RECOMMENDED
Diet, NPO after Midnight:      NPO Start Date: 04-Aug-2024,   NPO Start Time: 23:59 (08-04-24 @ 08:52) [Active]  Diet, Low Fiber (08-04-24 @ 08:52) [Active]

## 2024-08-04 NOTE — DIETITIAN INITIAL EVALUATION ADULT - ORAL INTAKE PTA/DIET HISTORY
patient reports appetite/PO intake vary upon his GI symptoms, but generally consumes a light breakfast, usually a banana and a yogurt, with a clear Ensure, and lunch and dinner vary but may be chicken salad sandwich for lunch, and dinner generally chicken or fish, with starch and vegetable. Based on his overall intake, he may increase consumption of Ensure clear supplement or consume Greek yogurts as a way of increasing caloric and protein intake.

## 2024-08-04 NOTE — PROGRESS NOTE ADULT - SUBJECTIVE AND OBJECTIVE BOX
GENERAL SURGERY PROGRESS NOTE    S: Patient seen and examined at bedside.  No acute overnight events. States he had liquid BM overnight and passing flatus. Still feeling mild abdominal pain and cramping that he attributes to gas. Voiding, ambulating and tolerating clear liquid diet. Aware of gastric battery change scheduled for tomorrow, 8/5. Patient denies any fever, chills, chest pain, shortness of breath, nausea, vomiting, or urinary complaints.    MEDICATIONS:  aluminum hydroxide/magnesium hydroxide/simethicone Suspension 30 milliLiter(s) Oral every 4 hours PRN  dextrose 5% + lactated ringers with potassium chloride 20 mEq/L 1000 milliLiter(s) IV Continuous <Continuous>  heparin   Injectable 5000 Unit(s) SubCutaneous every 8 hours  melatonin 3 milliGRAM(s) Oral at bedtime PRN  metoclopramide 5 milliGRAM(s) Oral three times a day before meals PRN  ondansetron Injectable 4 milliGRAM(s) IV Push every 8 hours PRN  pantoprazole  Injectable 40 milliGRAM(s) IV Push daily      O:  Vital Signs Last 24 Hrs  T(C): 36.7 (03 Aug 2024 05:19), Max: 37 (02 Aug 2024 15:53)  T(F): 98.1 (03 Aug 2024 05:19), Max: 98.6 (02 Aug 2024 15:53)  HR: 62 (03 Aug 2024 05:19) (62 - 76)  BP: 114/66 (03 Aug 2024 05:19) (114/66 - 163/84)  BP(mean): --  RR: 18 (03 Aug 2024 05:19) (16 - 18)  SpO2: 95% (03 Aug 2024 05:19) (95% - 100%)    Parameters below as of 03 Aug 2024 05:19  Patient On (Oxygen Delivery Method): room air        PHYSICAL EXAM:  GENERAL: No acute distress, lying comfortably in bed  HEAD:  Atraumatic, Normocephalic  CHEST/LUNG: Non labored respirations, no accessory muscle use. CTAB; No wheezes, rales, or rhonchi  HEART: Regular rate and rhythm; No murmurs, rubs, or gallops  ABDOMEN: Soft, non-tender, non-distended; bowel sounds+  EXT: calves non-tender b/l, no edema  NEUROLOGY: A&O x 3, no focal deficits    I&O SUMMARY:    08-02-24 @ 07:01  -  08-03-24 @ 07:00  --------------------------------------------------------  IN:    dextrose 5% + lactated ringers w/ Additives: 255 mL    IV PiggyBack: 100 mL  Total IN: 355 mL    OUT:  Total OUT: 0 mL    Total NET: 355 mL          LABS:      RADIOLOGY:  < from: CT Abdomen and Pelvis w/ Oral Cont and w/ IV Cont (08.01.24 @ 20:42) >    ACC: [id number] EXAM:  CT ABDOMEN AND PELVIS OC IC   ORDERED BY: TALIA LOPEZ     PROCEDURE DATE:  08/01/2024          INTERPRETATION:  CLINICAL INFORMATION: Abdominal pain nausea and vomiting    COMPARISON: CT chest abdomen pelvis 3/14/2022..    CONTRAST/COMPLICATIONS:  IV Contrast: Omnipaque 350  90 cc administered   10 cc discarded  Oral Contrast: Omnipaque 300  Complications: None reported at time of study completion    PROCEDURE:  CT of the Abdomen and Pelvis was performed.  [place] coronal reformats were performed.    FINDINGS:  LOWER CHEST: Within normal limits.    LIVER: Small left lobe cyst and other hypodensities too small to   characterize, unchanged.  BILE DUCTS: Normal caliber.  GALLBLADDER: Within normal limits.  SPLEEN: Within normal limits.  PANCREAS: Within normal limits.  ADRENALS: Within normal limits.  KIDNEYS/URETERS: Right renal cyst and other cortical hypodensity too   small to characterize.    BLADDER: Within normal limits.  REPRODUCTIVE ORGANS: Prostate within normal limits.    BOWEL: Status post hiatal hernia repair. Normal appendix. There are   dilated loops of fluid-filled small bowel measuring up to 3.7 cm. There   is a transition point in the mid abdomen (601:43) (301:73). No   significant bowel wall thickening. PERITONEUM/RETROPERITONEUM: There is a   gastric pacemaker device in the anterior right subcutaneous tissues with   the lead extending to the stomach. There is nearby short tubular   structure and another similar tubular structureseen in the pelvis which   are of uncertain etiology but unchanged. There is trace perisplenic   ascites and trace free fluid in the pelvis.  VESSELS: Within normal limits.  LYMPH NODES: No lymphadenopathy.  ABDOMINAL WALL: Within normal limits.  BONES: Degenerative changes.    IMPRESSION:    Small bowel obstruction as described above.      --- End of Report ---    < end of copied text >    ASSESSMENT:  59yM with PMHx of HTN, b/l PE (on eliquis), gastroparesis, Dobbins's esophagus, anemia, COPD, Depression, PTSD, and PSHx of fundoplication x 3 (repair of recurrent hiatal hernia), gastric pacemaker w/ Dr. Herrera 2018 and "sinus surgery" admitted for abdominal pain, CT with SBO. Patient reports BM/passing flatus. Tolerating diet. Scheduled for OR to replace gastric pacemaker battery on Monday, 8/5.     PLAN:  - Advance diet to low residue, NPO after midnight  - OR tomorrow for gastric battery change, will pre-op  - Refusing NGT unless sedated.  - f/u AM labs  - Serial abdominal exams  - DVT prophylaxis with HSQ  - OOB, pain control    Case to be discussed with Dr. Ogden (covering for Dr. Schaffer)

## 2024-08-04 NOTE — DIETITIAN INITIAL EVALUATION ADULT - ADD RECOMMEND
1) encourage small frequent meals as tolerated with emphasis on HBV protein foods  2) encourage menu selection

## 2024-08-04 NOTE — PROGRESS NOTE ADULT - SUBJECTIVE AND OBJECTIVE BOX
CC/F/U for: sbo    HPI:  Pt is a 60 yo M with PMH gastroparesis, hiatal hernia s/p fundoplication x3, gastric pacemaker, Dobbins's esophagus, HTN, diabetes, anemia, COPD, depression, and PTSD presents to the ED with worsening abdominal pain for the past several weeks. States he had a URI ~2 weeks ago and a significant cough after which he has had worsening abdominal pain particularly the last 2 days. Endorses tearing sensation and 10/10 pain in the abdomen starting yesterday AM. Has not had a bowel movement for the past 4 days so took miralax and had a small amount of loose stools this AM before the pain worsened. Is still passing gas. Endorsing nausea at baseline, however, worsened for the past 2 days.     Denies fever, chills, chest pain, palpitations, SOB, cough, vomiting, diarrhea, constipation, urinary frequency, urgency, or dysuria, headaches, changes in vision, dizziness, numbness, tingling.  Denies recent travel, recent antibiotic use, or sick contacts.    ED Course:   Vitals: BP: 175/50, HR: 78, Temp: 98, RR: 16, SpO2: 98% on   Labs:  WBC 13.12, H/H 13.5 39.4, Na 140, K 3.7  UA: Negative  CXR: Clear lungs as per personal read, official read pending   CT: Status post hiatal hernia repair. Normal appendix. There are   dilated loops of fluid-filled small bowel measuring up to 3.7 cm. There   is a transition point in the mid abdomen (601:43) (301:73). No   significant bowel wall thickening. PERITONEUM/RETROPERITONEUM: There is a   gastric pacemaker device in the anterior right subcutaneous tissues with   the lead extending to the stomach. There is nearby short tubular   structure and another similar tubular structureseen in the pelvis which   are of uncertain etiology but unchanged. There is trace perisplenic   ascites and trace free fluid in the pelvis.    EKG: NSR @ 83  Received in the ED: s/p 4mg IV Zofran x1, 1g IV Ofirmev x1, 1mg IV Dilaudid x2, 4mg IV morphine x1, 1L 0.9% NS bolus x1   (02 Aug 2024 02:21)        INTERVAL HPI/OVERNIGHT EVENTS:  Pt seen and examined at bedside - UGI series neg for obstruction; initiated on full liquids- tolerating     Allergies/Intolerance: No Known Allergies      MEDICATIONS  (STANDING):  pantoprazole  Injectable 40 milliGRAM(s) IV Push daily  senna 2 Tablet(s) Oral at bedtime    MEDICATIONS  (PRN):  aluminum hydroxide/magnesium hydroxide/simethicone Suspension 30 milliLiter(s) Oral every 4 hours PRN Dyspepsia  melatonin 3 milliGRAM(s) Oral at bedtime PRN Insomnia  metoclopramide 5 milliGRAM(s) Oral three times a day before meals PRN Gastroparesis  ondansetron Injectable 4 milliGRAM(s) IV Push every 8 hours PRN Nausea and/or Vomiting        ROS: as above; all other systems reviewed and wnl      PHYSICAL EXAMINATION:  Vital Signs Last 24 Hrs  T(C): 36.7 (04 Aug 2024 13:08), Max: 37.1 (03 Aug 2024 20:24)  T(F): 98.1 (04 Aug 2024 13:08), Max: 98.7 (03 Aug 2024 20:24)  HR: 72 (04 Aug 2024 13:08) (61 - 72)  BP: 139/80 (04 Aug 2024 13:08) (139/80 - 151/81)  RR: 18 (04 Aug 2024 13:08) (18 - 19)  SpO2: 98% (04 Aug 2024 13:08) (96% - 98%)    Parameters below as of 04 Aug 2024 13:08  Patient On (Oxygen Delivery Method): room air    GENERAL: middle-aged male, in NAD  HEENT: mucous membranes dry  RESP: respirations unlabored  HEART: HR 70s  ABDOMEN: soft, ND, mild ttp, no rebound/guarding   EXTREMITIES: no edema b/l LEs, no calf tenderness  NEURO: awake, alert, interactive; moves all extremities      LABS:    08-03    143  |  107  |  8   ----------------------------<  106<H>  3.6   |  30  |  1.20    Ca    8.7      03 Aug 2024 06:15    TPro  6.3  /  Alb  3.2<L>  /  TBili  0.7  /  DBili  x   /  AST  29  /  ALT  39  /  AlkPhos  60  08-03    PT/INR - ( 04 Aug 2024 09:40 )   PT: 12.6 sec;   INR: 1.08 ratio         PTT - ( 04 Aug 2024 09:40 )  PTT:52.9 sec  Urinalysis Basic - ( 03 Aug 2024 06:15 )    Color: x / Appearance: x / SG: x / pH: x  Gluc: 106 mg/dL / Ketone: x  / Bili: x / Urobili: x   Blood: x / Protein: x / Nitrite: x   Leuk Esterase: x / RBC: x / WBC x   Sq Epi: x / Non Sq Epi: x / Bacteria: x

## 2024-08-04 NOTE — DIETITIAN INITIAL EVALUATION ADULT - PERTINENT MEDS FT
MEDICATIONS  (STANDING):  heparin   Injectable 5000 Unit(s) SubCutaneous every 8 hours  pantoprazole  Injectable 40 milliGRAM(s) IV Push daily  senna 2 Tablet(s) Oral at bedtime    MEDICATIONS  (PRN):  aluminum hydroxide/magnesium hydroxide/simethicone Suspension 30 milliLiter(s) Oral every 4 hours PRN Dyspepsia  melatonin 3 milliGRAM(s) Oral at bedtime PRN Insomnia  metoclopramide 5 milliGRAM(s) Oral three times a day before meals PRN Gastroparesis  ondansetron Injectable 4 milliGRAM(s) IV Push every 8 hours PRN Nausea and/or Vomiting

## 2024-08-04 NOTE — PROGRESS NOTE ADULT - SUBJECTIVE AND OBJECTIVE BOX
[facility]    Geoffrey Rony Sellers NP    [address] [place] [address]  [phone]    INTERVAL HPI/ OVERNIGHT EVENTS:  Pt s/e, reports feeling better, on full liq. diet  +BM liquid overnight, +flatus    Allergies:  No Known Allergies    MEDICATIONS  (STANDING):  heparin   Injectable 5000 Unit(s) SubCutaneous every 8 hours  pantoprazole  Injectable 40 milliGRAM(s) IV Push daily  senna 2 Tablet(s) Oral at bedtime    MEDICATIONS  (PRN):  aluminum hydroxide/magnesium hydroxide/simethicone Suspension 30 milliLiter(s) Oral every 4 hours PRN Dyspepsia  melatonin 3 milliGRAM(s) Oral at bedtime PRN Insomnia  metoclopramide 5 milliGRAM(s) Oral three times a day before meals PRN Gastroparesis  ondansetron Injectable 4 milliGRAM(s) IV Push every 8 hours PRN Nausea and/or Vomiting      PMHX/PSHX:  HTN (hypertension)    Reactive airway disease    Asthma    GERD (gastroesophageal reflux disease)    Hiatal hernia    Diabetes mellitus    Anxiety    Low testosterone in male    Osteoarthritis    Intervertebral disc disorder    Rotator cuff tear, left    Shoulder pain, left    Depression    PE (pulmonary thromboembolism)    Insomnia    Gastroparesis    COVID-19 virus infection    COVID-19 vaccine series completed    H/O submucous nasal surgery    S/P laparoscopic fundoplication    S/P Achilles tendon repair    S/P carpal tunnel release    S/P tonsillectomy and adenoidectomy    H/O hiatal hernia    Presence of gastric pacemaker        Family history:  Family history of diabetes mellitus (Father, Sibling)    Family history of other heart disease (Mother)    Family history of prostate cancer (Father)    FHx: renal failure (Father, Mother)    Family history of asthma (Sibling, Mother)        Social History:     ROS:     General:  No wt loss, fevers, chills, night sweats, fatigue,   Eyes:  Good vision, no reported pain  ENT:  No sore throat, pain, runny nose, dysphagia  CV:  No pain, palpitations, hypo/hypertension  Resp:  No dyspnea, cough, tachypnea, wheezing  GI:  No pain, No nausea, No vomiting, No diarrhea, No constipation, No weight loss, No fever, No pruritis, No rectal bleeding, No tarry stools, No dysphagia,  :  No pain, bleeding, incontinence, nocturia  Muscle:  No pain, weakness  Neuro:  No weakness, tingling, memory problems  Psych:  No fatigue, insomnia, mood problems, depression  Endocrine:  No polyuria, polydipsia, cold/heat intolerance  Heme:  No petechiae, ecchymosis, easy bruisability  Skin:  No rash, tattoos, scars, edema      PHYSICAL EXAM:   Vital Signs Last 24 Hrs  T(C): 36.6 (04 Aug 2024 04:45), Max: 37.1 (03 Aug 2024 20:24)  T(F): 97.8 (04 Aug 2024 04:45), Max: 98.7 (03 Aug 2024 20:24)  HR: 61 (04 Aug 2024 04:45) (61 - 71)  BP: 147/83 (04 Aug 2024 04:45) (137/76 - 151/81)  BP(mean): --  RR: 19 (04 Aug 2024 04:45) (18 - 19)  SpO2: 96% (04 Aug 2024 04:45) (96% - 98%)    Parameters below as of 04 Aug 2024 04:45  Patient On (Oxygen Delivery Method): room air            Daily     Daily     GENERAL:  Appears stated age, well-groomed, well-nourished, no distress  HEENT:  NC/AT,  conjunctivae clear and pink, no thyromegaly, nodules, adenopathy, no JVD, sclera -anicteric  CHEST:  Full & symmetric excursion, no increased effort, breath sounds clear  HEART:  Regular rhythm, S1, S2, no murmur/rub/S3/S4, no abdominal bruit, no edema  ABDOMEN:  Soft, non-tender, non-distended, normoactive bowel sounds,  no masses ,no hepato-splenomegaly, no signs of chronic liver disease  EXTEREMITIES:  no cyanosis,clubbing or edema  SKIN:  No rash/erythema/ecchymoses/petechiae/wounds/abscess/warm/dry  NEURO:  Alert, oriented, no asterixis, no tremor, no encephalopathy    LABS:                          12.5   9.05  )-----------( 205      ( 02 Aug 2024 04:35 )             37.5                      08-03    143  |  107  |  8   ----------------------------<  106<H>  3.6   |  30  |  1.20    Ca    8.7      03 Aug 2024 06:15  Phos  2.8     08-02  Mg     1.9     08-02    TPro  6.3  /  Alb  3.2<L>  /  TBili  0.7  /  DBili  x   /  AST  29  /  ALT  39  /  AlkPhos  60  08-03        LIVER FUNCTIONS - ( 02 Aug 2024 04:35 )  Alb: 3.3 g/dL / Pro: 6.8 g/dL / ALK PHOS: 66 U/L / ALT: 41 U/L / AST: 26 U/L / GGT: x           PT/INR - ( 02 Aug 2024 04:35 )   PT: 12.2 sec;   INR: 1.04 ratio         PTT - ( 02 Aug 2024 04:35 )  PTT:36.4 sec  Urinalysis Basic - ( 02 Aug 2024 04:35 )    Color: x / Appearance: x / SG: x / pH: x  Gluc: 105 mg/dL / Ketone: x  / Bili: x / Urobili: x   Blood: x / Protein: x / Nitrite: x   Leuk Esterase: x / RBC: x / WBC x   Sq Epi: x / Non Sq Epi: x / Bacteria: x      Amylase Serum--      Lipase serum23       Ammonia--      Imaging:        < from: Xray Small Bowel Series (08.02.24 @ 13:28) >    ACC: [id number] EXAM:  XR SM INTEST SNGL CON STDY   ORDERED BY: FAHRINA T KHAN     PROCEDURE DATE:  08/02/2024          INTERPRETATION:  Gastrografin Challenge    DATE OF EXAM: 8/2/24    COMPARISON: Barium esophagram done 7/23/24. 8/1/24 CT scan ofabdomen and   pelvis done 8/1/24.    CLINICAL INDICATION: Assess for SBO    TECHNIQUE: Gastrografin challenge.  film taken; 100ccs of   Gastrografin given. Immediate film taken; then 1 and 2-hour delayed films   obtained.    FINDINGS:   film shows the lungs to be clear.  Lower right quadrant gastric pacemaker device with leads extending to the   stomach.  Mild gaseousness of the colon likely due to nonspecific ileus. Patient is   S/P prior hiatal hernia repair.  Omnipaque 300 oral contrast seen in cecum and ascending colon. Cecum is   about 8.0cm in widest diameter with coral contrast likely due to   nonspecific ileus. Moderate fecal colonic load noted.  No significant small bowel gaseous distention noted.  Small bowel loops filled with Gastrografin showed no significant   distention no strictures noted. There is normal passage of contrast into   the cecum and ascending colon.    IMPRESSION:  Moderate fecal colonic load with mild ileus of the ascending and   transverse colon.  No small bowel obstruction noted.    --- End of Report ---            ABUNDIO HASTINGS MD; Attending Radiologist  This document has been electronically signed. Aug  2 2024  1:46PM    < end of copied text >

## 2024-08-04 NOTE — DIETITIAN INITIAL EVALUATION ADULT - REASON FOR ADMISSION
Intestinal obstruction  per H&P: Reason for Admission: SBO  History of Present Illness:   Pt is a 60 yo M with PMH gastroparesis, hiatal hernia s/p fundoplication x3, gastric pacemaker, Dobbins's esophagus, HTN, diabetes, anemia, COPD, depression, and PTSD presents to the ED with worsening abdominal pain for the past several weeks. States he had a URI ~2 weeks ago and a significant cough after which he has had worsening abdominal pain particularly the last 2 days. Endorses tearing sensation and 10/10 pain in the abdomen starting yesterday AM. Has not had a bowel movement for the past 4 days so took miralax and had a small amount of loose stools this AM before the pain worsened. Is still passing gas. Endorsing nausea at baseline, however, worsened for the past 2 days.

## 2024-08-04 NOTE — DIETITIAN INITIAL EVALUATION ADULT - OTHER INFO
Patient seen for nutrition assessment. Denies food allergies, denies difficulty chewing/swallowing, reports GI symptoms improving compared to admission. Was able to tolerate diet upgrade to low fiber at breakfast this morning which he reports he tolerated well. . Reports he does have a tendency to lose weight easily, but tries to be proactive to avoid weight loss or regain weight he has lost. He has been ill over the past month and suspects he experienced some weight loss, estimated 10 pounds in about 1 month. Noted weights of 167 lbs vs 187 pounds. Reports his UBW to be 187 pounds, which has been relatively stable for the past year. Receptive to receiving Ensure Clear during hospital admission. Verbally reviewed tips to help maintain adequate PO intake, and verbally reviewed gastroparesis nutrition therapy.

## 2024-08-04 NOTE — DIETITIAN INITIAL EVALUATION ADULT - CALCULATED TO (G/KG)
Patient is a 79y old  Male who presents with a chief complaint of fall, rash , Bandemia , (31 Jan 2019 19:07)        SUBJECTIVE / OVERNIGHT EVENTS: No acute overnight events. Pt off one-to-one. He denies any fevers, chills, CP, N/V/D overnight. He states that he feels better and is ok with going to rehab after his treatment is complete.    MEDICATIONS  (STANDING):  acyclovir IVPB      acyclovir IVPB 750 milliGRAM(s) IV Intermittent every 12 hours  ALBUTerol    90 MICROgram(s) HFA Inhaler 1 Puff(s) Inhalation every 4 hours  ALBUTerol/ipratropium for Nebulization 3 milliLiter(s) Nebulizer every 6 hours  amLODIPine   Tablet 10 milliGRAM(s) Oral daily  aspirin enteric coated 81 milliGRAM(s) Oral daily  atorvastatin 10 milliGRAM(s) Oral at bedtime  buDESOnide 160 MICROgram(s)/formoterol 4.5 MICROgram(s) Inhaler 2 Puff(s) Inhalation two times a day  carvedilol 12.5 milliGRAM(s) Oral every 12 hours  clopidogrel Tablet 75 milliGRAM(s) Oral daily  lactobacillus acidophilus 1 Tablet(s) Oral two times a day with meals  mupirocin 2% Ointment 1 Application(s) Topical three times a day  petrolatum white Ointment 1 Application(s) Topical two times a day  piperacillin/tazobactam IVPB. 3.375 Gram(s) IV Intermittent every 8 hours  thiamine 100 milliGRAM(s) Oral daily  vancomycin  IVPB 1000 milliGRAM(s) IV Intermittent every 12 hours    MEDICATIONS  (PRN):  acetaminophen   Tablet .. 650 milliGRAM(s) Oral every 6 hours PRN Temp greater or equal to 38C (100.4F)  guaiFENesin    Syrup 100 milliGRAM(s) Oral every 6 hours PRN Cough        PHYSICAL EXAM  GENERAL: NAD, well-developed  HEAD:  Atraumatic, Normocephalic  EYES: EOMI, PERRLA, conjunctiva and sclera clear  NECK: Supple, No JVD. Large scabbed lesion on the back of the neck. Non-tender on palpation.  CHEST/LUNG: Expiratory wheezing heard throughout the lung fields. Difficult to hear heart sounds. Pulses regular rate and rhythym.  ABDOMEN: Soft, Nontender, Nondistended; Bowel sounds present  EXTREMITIES:  2+ Peripheral Pulses, No clubbing, cyanosis, or edema  PSYCH: AAOx3  SKIN: Disseminated crusted, erythematous rash throughout the arms, legs and trunk.    LABS:             (02-01 @ 06:00)                      12.1  4.43 )-----------( 95                 36.1    Neutrophils = -- (--%)  Lymphocytes = -- (--%)  Eosinophils = -- (--%)  Basophils = -- (--%)  Monocytes = -- (--%)  Bands = --%    02-01    136  |  100  |  7   ----------------------------<  110<H>  3.8   |  24  |  1.00    Ca    8.9      01 Feb 2019 06:00            RVP:          Tox: 118.72

## 2024-08-04 NOTE — DIETITIAN INITIAL EVALUATION ADULT - NS FNS DIET ORDER
Diet, NPO after Midnight:      NPO Start Date: 04-Aug-2024,   NPO Start Time: 23:59 (08-04-24 @ 08:52)  Diet, Low Fiber (08-04-24 @ 08:52)

## 2024-08-04 NOTE — PROGRESS NOTE ADULT - NS ATTEND AMEND GEN_ALL_CORE FT
Covering for Dr Schaffer and Sharon  Pt seen and feeling well.  Tolerating diet.  Feels a bit bloated but clinically less distended.  Plan for Gastric Stimulater generator replacement on Monday. No change is current condition.  PreOp for OR tomorrow  Plan for Gastric Stimulater generator replacement on Monday.

## 2024-08-04 NOTE — DIETITIAN NUTRITION RISK NOTIFICATION - ADDITIONAL COMMENTS/DIETITIAN RECOMMENDATIONS
recommend addition of Ensure Clear BID  encourage small frequent meals as tolerated with emphasis on HBV protein foods

## 2024-08-04 NOTE — DIETITIAN INITIAL EVALUATION ADULT - NSFNSADHERENCEPTAFT_GEN_A_CORE
patient reports he has been diagnosed with pre-diabetes, not taking oral medications or insulin at home, glucose levels generally good unless he is trying to put weight on and increases consumption of sugary foods. Patient has been monitoring GI symptoms for over a decade, and is aware of which foods are easier [id number] and which ones aggravate symptoms (which he generally avoids)

## 2024-08-05 ENCOUNTER — TRANSCRIPTION ENCOUNTER (OUTPATIENT)
Age: 59
End: 2024-08-05

## 2024-08-05 LAB
ANION GAP SERPL CALC-SCNC: 6 MMOL/L — SIGNIFICANT CHANGE UP (ref 5–17)
BUN SERPL-MCNC: 7 MG/DL — SIGNIFICANT CHANGE UP (ref 7–23)
CALCIUM SERPL-MCNC: 8.9 MG/DL — SIGNIFICANT CHANGE UP (ref 8.5–10.1)
CHLORIDE SERPL-SCNC: 108 MMOL/L — SIGNIFICANT CHANGE UP (ref 96–108)
CO2 SERPL-SCNC: 28 MMOL/L — SIGNIFICANT CHANGE UP (ref 22–31)
CREAT SERPL-MCNC: 1.1 MG/DL — SIGNIFICANT CHANGE UP (ref 0.5–1.3)
EGFR: 77 ML/MIN/1.73M2 — SIGNIFICANT CHANGE UP
GLUCOSE SERPL-MCNC: 98 MG/DL — SIGNIFICANT CHANGE UP (ref 70–99)
HCT VFR BLD CALC: 34.1 % — LOW (ref 39–50)
HGB BLD-MCNC: 11.9 G/DL — LOW (ref 13–17)
MCHC RBC-ENTMCNC: 31.3 PG — SIGNIFICANT CHANGE UP (ref 27–34)
MCHC RBC-ENTMCNC: 34.9 GM/DL — SIGNIFICANT CHANGE UP (ref 32–36)
MCV RBC AUTO: 89.7 FL — SIGNIFICANT CHANGE UP (ref 80–100)
NRBC # BLD: 0 /100 WBCS — SIGNIFICANT CHANGE UP (ref 0–0)
PLATELET # BLD AUTO: 207 K/UL — SIGNIFICANT CHANGE UP (ref 150–400)
POTASSIUM SERPL-MCNC: 3.9 MMOL/L — SIGNIFICANT CHANGE UP (ref 3.5–5.3)
POTASSIUM SERPL-SCNC: 3.9 MMOL/L — SIGNIFICANT CHANGE UP (ref 3.5–5.3)
RBC # BLD: 3.8 M/UL — LOW (ref 4.2–5.8)
RBC # FLD: 13.7 % — SIGNIFICANT CHANGE UP (ref 10.3–14.5)
SODIUM SERPL-SCNC: 142 MMOL/L — SIGNIFICANT CHANGE UP (ref 135–145)
WBC # BLD: 6.19 K/UL — SIGNIFICANT CHANGE UP (ref 3.8–10.5)
WBC # FLD AUTO: 6.19 K/UL — SIGNIFICANT CHANGE UP (ref 3.8–10.5)

## 2024-08-05 PROCEDURE — 99232 SBSQ HOSP IP/OBS MODERATE 35: CPT

## 2024-08-05 PROCEDURE — 99233 SBSQ HOSP IP/OBS HIGH 50: CPT

## 2024-08-05 RX ORDER — LORATADINE 10 MG
17 TABLET,DISINTEGRATING ORAL ONCE
Refills: 0 | Status: COMPLETED | OUTPATIENT
Start: 2024-08-05 | End: 2024-08-05

## 2024-08-05 RX ORDER — SOD SULF/SODIUM/NAHCO3/KCL/PEG
1000 SOLUTION, RECONSTITUTED, ORAL ORAL EVERY 4 HOURS
Refills: 0 | Status: COMPLETED | OUTPATIENT
Start: 2024-08-05 | End: 2024-08-05

## 2024-08-05 RX ORDER — PRUCALOPRIDE 1 MG/1
2 TABLET, FILM COATED ORAL DAILY
Refills: 0 | Status: DISCONTINUED | OUTPATIENT
Start: 2024-08-05 | End: 2024-08-06

## 2024-08-05 RX ORDER — HEPARIN SODIUM 1000 [USP'U]/ML
5000 INJECTION, SOLUTION INTRAVENOUS; SUBCUTANEOUS EVERY 12 HOURS
Refills: 0 | Status: DISCONTINUED | OUTPATIENT
Start: 2024-08-05 | End: 2024-08-06

## 2024-08-05 RX ORDER — PANTOPRAZOLE SODIUM 20 MG/1
40 TABLET, DELAYED RELEASE ORAL
Refills: 0 | Status: DISCONTINUED | OUTPATIENT
Start: 2024-08-06 | End: 2024-08-06

## 2024-08-05 RX ADMIN — PANTOPRAZOLE SODIUM 40 MILLIGRAM(S): 20 TABLET, DELAYED RELEASE ORAL at 12:03

## 2024-08-05 RX ADMIN — HEPARIN SODIUM 5000 UNIT(S): 1000 INJECTION, SOLUTION INTRAVENOUS; SUBCUTANEOUS at 09:24

## 2024-08-05 RX ADMIN — Medication 1000 MILLILITER(S): at 18:18

## 2024-08-05 RX ADMIN — SENNOSIDES 2 TABLET(S): 8.6 TABLET ORAL at 21:02

## 2024-08-05 RX ADMIN — Medication 1000 MILLILITER(S): at 21:19

## 2024-08-05 RX ADMIN — Medication 17 GRAM(S): at 12:07

## 2024-08-05 RX ADMIN — HEPARIN SODIUM 5000 UNIT(S): 1000 INJECTION, SOLUTION INTRAVENOUS; SUBCUTANEOUS at 18:17

## 2024-08-05 RX ADMIN — PRUCALOPRIDE 2 MILLIGRAM(S): 1 TABLET, FILM COATED ORAL at 14:21

## 2024-08-05 NOTE — CASE MANAGEMENT PROGRESS NOTE - NSCMPROGRESSNOTE_GEN_ALL_CORE
Patient discussed during interdisciplinary rounds. Patient remains acute on IV Ancef. Plan for OR 8/6 for gastric PPM battery replacement. CM will remain available throughout hospitalization.

## 2024-08-05 NOTE — PROGRESS NOTE ADULT - SUBJECTIVE AND OBJECTIVE BOX
[facility]  Michael Hua PA-C  [address] [place] [address]  [phone]      INTERVAL HPI/OVERNIGHT EVENTS:  Pt s/e  No BM    MEDICATIONS  (STANDING):  heparin   Injectable 5000 Unit(s) SubCutaneous every 12 hours  pantoprazole  Injectable 40 milliGRAM(s) IV Push daily  polyethylene glycol 3350 17 Gram(s) Oral once  polyethylene glycol/electrolyte Solution 1000 milliLiter(s) Oral every 4 hours  senna 2 Tablet(s) Oral at bedtime    MEDICATIONS  (PRN):  aluminum hydroxide/magnesium hydroxide/simethicone Suspension 30 milliLiter(s) Oral every 4 hours PRN Dyspepsia  melatonin 3 milliGRAM(s) Oral at bedtime PRN Insomnia  metoclopramide 5 milliGRAM(s) Oral three times a day before meals PRN Gastroparesis  ondansetron Injectable 4 milliGRAM(s) IV Push every 8 hours PRN Nausea and/or Vomiting      Allergies  No Known Allergies      PHYSICAL EXAM:   Vital Signs:  Vital Signs Last 24 Hrs  T(C): 36.7 (05 Aug 2024 05:08), Max: 37.3 (04 Aug 2024 20:03)  T(F): 98.1 (05 Aug 2024 05:08), Max: 99.2 (04 Aug 2024 20:03)  HR: 60 (05 Aug 2024 05:08) (60 - 72)  BP: 126/75 (05 Aug 2024 05:08) (126/75 - 139/80)  BP(mean): --  RR: 18 (05 Aug 2024 05:08) (18 - 18)  SpO2: 97% (05 Aug 2024 05:08) (97% - 100%)    Parameters below as of 05 Aug 2024 05:08  Patient On (Oxygen Delivery Method): room air      Daily     Daily Weight in k.1 (05 Aug 2024 05:08)    GENERAL:  Appears stated age  HEENT:  NC/AT  CHEST:  Full & symmetric excursion  HEART:  Regular rhythm  ABDOMEN:  Soft, non-tender, non-distended  EXTEREMITIES:  no cyanosis  SKIN:  No rash  NEURO:  Alert      LABS:                        11.9   6.19  )-----------( 207      ( 05 Aug 2024 05:55 )             34.1     08-    142  |  108  |  7   ----------------------------<  98  3.9   |  28  |  1.10    Ca    8.9      05 Aug 2024 05:55      PT/INR - ( 04 Aug 2024 09:40 )   PT: 12.6 sec;   INR: 1.08 ratio         PTT - ( 04 Aug 2024 09:40 )  PTT:52.9 sec  Urinalysis Basic - ( 05 Aug 2024 05:55 )    Color: x / Appearance: x / SG: x / pH: x  Gluc: 98 mg/dL / Ketone: x  / Bili: x / Urobili: x   Blood: x / Protein: x / Nitrite: x   Leuk Esterase: x / RBC: x / WBC x   Sq Epi: x / Non Sq Epi: x / Bacteria: x

## 2024-08-05 NOTE — PROGRESS NOTE ADULT - SUBJECTIVE AND OBJECTIVE BOX
GENERAL SURGERY PROGRESS NOTE    S: Patient seen and examined at bedside.  No acute overnight events.  Patient reports no new complaints at this time.  Admits to flatus and small amount of BM.  Voiding, ambulating and tolerating diet, was NPO after midnight in preparation for OR today. Patient denies any fever, chills, chest pain, shortness of breath, nausea, vomiting, or urinary complaints.    MEDICATIONS:  aluminum hydroxide/magnesium hydroxide/simethicone Suspension 30 milliLiter(s) Oral every 4 hours PRN  heparin   Injectable 5000 Unit(s) SubCutaneous every 12 hours  melatonin 3 milliGRAM(s) Oral at bedtime PRN  metoclopramide 5 milliGRAM(s) Oral three times a day before meals PRN  ondansetron Injectable 4 milliGRAM(s) IV Push every 8 hours PRN  pantoprazole  Injectable 40 milliGRAM(s) IV Push daily  senna 2 Tablet(s) Oral at bedtime      O:  Vital Signs Last 24 Hrs  T(C): 36.7 (05 Aug 2024 05:08), Max: 37.3 (04 Aug 2024 20:03)  T(F): 98.1 (05 Aug 2024 05:08), Max: 99.2 (04 Aug 2024 20:03)  HR: 60 (05 Aug 2024 05:08) (60 - 72)  BP: 126/75 (05 Aug 2024 05:08) (126/75 - 139/80)  BP(mean): --  RR: 18 (05 Aug 2024 05:08) (18 - 18)  SpO2: 97% (05 Aug 2024 05:08) (97% - 100%)    Parameters below as of 05 Aug 2024 05:08  Patient On (Oxygen Delivery Method): room air        PHYSICAL EXAM:  GENERAL: No acute distress, lying comfortably in bed  HEAD:  Atraumatic, Normocephalic  CHEST/LUNG: Non labored respirations, no accessory muscle use. CTAB; No wheezes, rales, or rhonchi  HEART: Regular rate and rhythm; No murmurs, rubs, or gallops  ABDOMEN: Soft, non-tender, non-distended; bowel sounds+  EXT: calves non-tender b/l, no edema  NEUROLOGY: A&O x 3, no focal deficits    I&O SUMMARY:    08-04-24 @ 07:01  -  08-05-24 @ 07:00  --------------------------------------------------------  IN:    dextrose 5% + lactated ringers w/ Additives: 75 mL    Oral Fluid: 720 mL  Total IN: 795 mL    OUT:    Voided (mL): 300 mL  Total OUT: 300 mL    Total NET: 495 mL          LABS:                        11.9   6.19  )-----------( 207      ( 05 Aug 2024 05:55 )             34.1     08-05    142  |  108  |  7   ----------------------------<  98  3.9   |  28  |  1.10    Ca    8.9      05 Aug 2024 05:55      PT/INR - ( 04 Aug 2024 09:40 )   PT: 12.6 sec;   INR: 1.08 ratio         PTT - ( 04 Aug 2024 09:40 )  PTT:52.9 sec      Lactate, Blood: 1.2 mmol/L (08-01 @ 22:00)        RADIOLOGY:    ASSESSMENT:  59yM with PMHx of HTN, b/l PE (on eliquis), gastroparesis, Dobbins's esophagus, anemia, COPD, Depression, PTSD, and PSHx of fundoplication x 3 (repair of recurrent hiatal hernia), gastric pacemaker w/ Dr. Herrera 2018 and "sinus surgery" admitted for abdominal pain, CT with SBO. Patient reports BM/passing flatus. Tolerating diet. Was scheduled for OR to replace gastric pacemaker battery today but case has been rescheduled for tomorrow, 8/6. Will pre-op patient accordingly.    PLAN:  - Advance diet to low residue, NPO after midnight  - OR tomorrow for gastric battery change, will pre-op  - Refusing NGT unless sedated.  - f/u AM labs  - Serial abdominal exams  - DVT prophylaxis with HSQ  - OOB, pain control    Case to be discussed with Dr. Schaffer

## 2024-08-05 NOTE — PROGRESS NOTE ADULT - SUBJECTIVE AND OBJECTIVE BOX
Subjective: Patient is doing well today. He was rescheduled for OR for tomorrow for gastric ppm battery change. He is tolerating diet and ambulating in the hallway. Has difficulty with defecation     MEDICATIONS  (STANDING):  heparin   Injectable 5000 Unit(s) SubCutaneous every 12 hours  pantoprazole  Injectable 40 milliGRAM(s) IV Push daily  polyethylene glycol/electrolyte Solution 1000 milliLiter(s) Oral every 4 hours  prucalopride Tablet 2 milliGRAM(s) Oral daily  senna 2 Tablet(s) Oral at bedtime    MEDICATIONS  (PRN):  aluminum hydroxide/magnesium hydroxide/simethicone Suspension 30 milliLiter(s) Oral every 4 hours PRN Dyspepsia  melatonin 3 milliGRAM(s) Oral at bedtime PRN Insomnia  metoclopramide 5 milliGRAM(s) Oral three times a day before meals PRN Gastroparesis  ondansetron Injectable 4 milliGRAM(s) IV Push every 8 hours PRN Nausea and/or Vomiting      Allergies    No Known Allergies    Intolerances        Vital Signs Last 24 Hrs  T(C): 36.9 (05 Aug 2024 12:10), Max: 37.3 (04 Aug 2024 20:03)  T(F): 98.4 (05 Aug 2024 12:10), Max: 99.2 (04 Aug 2024 20:03)  HR: 66 (05 Aug 2024 12:10) (60 - 67)  BP: 128/74 (05 Aug 2024 12:10) (126/75 - 134/80)  BP(mean): --  RR: 18 (05 Aug 2024 12:10) (18 - 18)  SpO2: 98% (05 Aug 2024 12:10) (97% - 100%)    Parameters below as of 05 Aug 2024 12:10  Patient On (Oxygen Delivery Method): room air        PHYSICAL EXAM:  GENERAL: NAD, well-groomed, well-developed  HEAD:  Atraumatic, Normocephalic  ENMT: Moist mucous membranes,   NECK: Supple, No JVD, Normal thyroid  NERVOUS SYSTEM:  All 4 extremities mobile, no gross sensory deficits.   CHEST/LUNG: Clear to auscultation bilaterally; No rales, rhonchi, wheezing, or rubs  HEART: Regular rate and rhythm; No murmurs, rubs, or gallops  ABDOMEN: Soft, Nontender, Nondistended; Bowel sounds present  EXTREMITIES:  2+ Peripheral Pulses, No clubbing, cyanosis, or edema      LABS:                        11.9   6.19  )-----------( 207      ( 05 Aug 2024 05:55 )             34.1     05 Aug 2024 05:55    142    |  108    |  7      ----------------------------<  98     3.9     |  28     |  1.10     Ca    8.9        05 Aug 2024 05:55      PT/INR - ( 04 Aug 2024 09:40 )   PT: 12.6 sec;   INR: 1.08 ratio         PTT - ( 04 Aug 2024 09:40 )  PTT:52.9 sec  Urinalysis Basic - ( 05 Aug 2024 05:55 )    Color: x / Appearance: x / SG: x / pH: x  Gluc: 98 mg/dL / Ketone: x  / Bili: x / Urobili: x   Blood: x / Protein: x / Nitrite: x   Leuk Esterase: x / RBC: x / WBC x   Sq Epi: x / Non Sq Epi: x / Bacteria: x      CAPILLARY BLOOD GLUCOSE          RADIOLOGY & ADDITIONAL TESTS:    Imaging Personally Reviewed:  [ ] YES     Consultant(s) Notes Reviewed:      Care Discussed with Consultants/Other Providers:    Advanced Directives: [ ] DNR  [ ] No feeding tube  [ ] MOLST in chart  [ ] MOLST completed today  [ ] Unknown

## 2024-08-06 ENCOUNTER — TRANSCRIPTION ENCOUNTER (OUTPATIENT)
Age: 59
End: 2024-08-06

## 2024-08-06 VITALS
HEART RATE: 67 BPM | SYSTOLIC BLOOD PRESSURE: 155 MMHG | DIASTOLIC BLOOD PRESSURE: 77 MMHG | RESPIRATION RATE: 17 BRPM | OXYGEN SATURATION: 100 % | TEMPERATURE: 98 F

## 2024-08-06 LAB
ALBUMIN SERPL ELPH-MCNC: 3.3 G/DL — SIGNIFICANT CHANGE UP (ref 3.3–5)
ALP SERPL-CCNC: 63 U/L — SIGNIFICANT CHANGE UP (ref 40–120)
ALT FLD-CCNC: 61 U/L — SIGNIFICANT CHANGE UP (ref 12–78)
ANION GAP SERPL CALC-SCNC: 6 MMOL/L — SIGNIFICANT CHANGE UP (ref 5–17)
APTT BLD: 35.8 SEC — HIGH (ref 24.5–35.6)
AST SERPL-CCNC: 55 U/L — HIGH (ref 15–37)
BILIRUB SERPL-MCNC: 0.6 MG/DL — SIGNIFICANT CHANGE UP (ref 0.2–1.2)
BUN SERPL-MCNC: 7 MG/DL — SIGNIFICANT CHANGE UP (ref 7–23)
CALCIUM SERPL-MCNC: 8.7 MG/DL — SIGNIFICANT CHANGE UP (ref 8.5–10.1)
CHLORIDE SERPL-SCNC: 109 MMOL/L — HIGH (ref 96–108)
CO2 SERPL-SCNC: 29 MMOL/L — SIGNIFICANT CHANGE UP (ref 22–31)
CREAT SERPL-MCNC: 1.1 MG/DL — SIGNIFICANT CHANGE UP (ref 0.5–1.3)
EGFR: 77 ML/MIN/1.73M2 — SIGNIFICANT CHANGE UP
GLUCOSE SERPL-MCNC: 97 MG/DL — SIGNIFICANT CHANGE UP (ref 70–99)
HCT VFR BLD CALC: 34.5 % — LOW (ref 39–50)
HGB BLD-MCNC: 12 G/DL — LOW (ref 13–17)
INR BLD: 1.05 RATIO — SIGNIFICANT CHANGE UP (ref 0.85–1.18)
MCHC RBC-ENTMCNC: 31 PG — SIGNIFICANT CHANGE UP (ref 27–34)
MCHC RBC-ENTMCNC: 34.8 GM/DL — SIGNIFICANT CHANGE UP (ref 32–36)
MCV RBC AUTO: 89.1 FL — SIGNIFICANT CHANGE UP (ref 80–100)
NRBC # BLD: 0 /100 WBCS — SIGNIFICANT CHANGE UP (ref 0–0)
PLATELET # BLD AUTO: 197 K/UL — SIGNIFICANT CHANGE UP (ref 150–400)
POTASSIUM SERPL-MCNC: 3.6 MMOL/L — SIGNIFICANT CHANGE UP (ref 3.5–5.3)
POTASSIUM SERPL-SCNC: 3.6 MMOL/L — SIGNIFICANT CHANGE UP (ref 3.5–5.3)
PROT SERPL-MCNC: 6.3 G/DL — SIGNIFICANT CHANGE UP (ref 6–8.3)
PROTHROM AB SERPL-ACNC: 12.3 SEC — SIGNIFICANT CHANGE UP (ref 9.5–13)
RBC # BLD: 3.87 M/UL — LOW (ref 4.2–5.8)
RBC # FLD: 14.1 % — SIGNIFICANT CHANGE UP (ref 10.3–14.5)
SODIUM SERPL-SCNC: 144 MMOL/L — SIGNIFICANT CHANGE UP (ref 135–145)
WBC # BLD: 6.4 K/UL — SIGNIFICANT CHANGE UP (ref 3.8–10.5)
WBC # FLD AUTO: 6.4 K/UL — SIGNIFICANT CHANGE UP (ref 3.8–10.5)

## 2024-08-06 PROCEDURE — 99233 SBSQ HOSP IP/OBS HIGH 50: CPT | Mod: 25

## 2024-08-06 PROCEDURE — 99239 HOSP IP/OBS DSCHRG MGMT >30: CPT

## 2024-08-06 PROCEDURE — 64590 INS/RPL PRPH SAC/GSTR NPG/R: CPT

## 2024-08-06 DEVICE — IMPLANTABLE DEVICE: Type: IMPLANTABLE DEVICE | Status: FUNCTIONAL

## 2024-08-06 RX ORDER — SENNOSIDES 8.6 MG/1
2 TABLET ORAL AT BEDTIME
Refills: 0 | Status: DISCONTINUED | OUTPATIENT
Start: 2024-08-06 | End: 2024-08-06

## 2024-08-06 RX ORDER — ACETAMINOPHEN 500 MG
1000 TABLET ORAL ONCE
Refills: 0 | Status: COMPLETED | OUTPATIENT
Start: 2024-08-06 | End: 2024-08-06

## 2024-08-06 RX ORDER — PANTOPRAZOLE SODIUM 20 MG/1
40 TABLET, DELAYED RELEASE ORAL
Refills: 0 | Status: DISCONTINUED | OUTPATIENT
Start: 2024-08-06 | End: 2024-08-06

## 2024-08-06 RX ORDER — CEFAZOLIN SODIUM 10 G
2000 VIAL (EA) INJECTION ONCE
Refills: 0 | Status: COMPLETED | OUTPATIENT
Start: 2024-08-06 | End: 2024-08-06

## 2024-08-06 RX ORDER — DEXTROSE MONOHYDRATE, SODIUM CHLORIDE, SODIUM LACTATE, CALCIUM CHLORIDE, MAGNESIUM CHLORIDE 1.5; 538; 448; 18.4; 5.08 G/100ML; MG/100ML; MG/100ML; MG/100ML; MG/100ML
1000 SOLUTION INTRAPERITONEAL
Refills: 0 | Status: DISCONTINUED | OUTPATIENT
Start: 2024-08-06 | End: 2024-08-06

## 2024-08-06 RX ORDER — ACETAMINOPHEN 500 MG
650 TABLET ORAL EVERY 6 HOURS
Refills: 0 | Status: DISCONTINUED | OUTPATIENT
Start: 2024-08-06 | End: 2024-08-06

## 2024-08-06 RX ORDER — HEPARIN SODIUM 1000 [USP'U]/ML
5000 INJECTION, SOLUTION INTRAVENOUS; SUBCUTANEOUS EVERY 8 HOURS
Refills: 0 | Status: DISCONTINUED | OUTPATIENT
Start: 2024-08-06 | End: 2024-08-06

## 2024-08-06 RX ORDER — SENNOSIDES 8.6 MG/1
2 TABLET ORAL
Qty: 0 | Refills: 0 | DISCHARGE
Start: 2024-08-06

## 2024-08-06 RX ORDER — KETOROLAC TROMETHAMINE 10 MG
15 TABLET ORAL EVERY 6 HOURS
Refills: 0 | Status: DISCONTINUED | OUTPATIENT
Start: 2024-08-06 | End: 2024-08-06

## 2024-08-06 RX ORDER — ACETAMINOPHEN 500 MG
2 TABLET ORAL
Qty: 0 | Refills: 0 | DISCHARGE
Start: 2024-08-06

## 2024-08-06 RX ORDER — ONDANSETRON HCL/PF 4 MG/2 ML
4 VIAL (ML) INJECTION ONCE
Refills: 0 | Status: DISCONTINUED | OUTPATIENT
Start: 2024-08-06 | End: 2024-08-06

## 2024-08-06 RX ORDER — KETOROLAC TROMETHAMINE 10 MG
1 TABLET ORAL
Qty: 15 | Refills: 0
Start: 2024-08-06

## 2024-08-06 RX ORDER — PRUCALOPRIDE 1 MG/1
2 TABLET, FILM COATED ORAL DAILY
Refills: 0 | Status: DISCONTINUED | OUTPATIENT
Start: 2024-08-06 | End: 2024-08-06

## 2024-08-06 RX ORDER — METOCLOPRAMIDE HCL 5 MG/ML
5 VIAL (ML) INJECTION
Refills: 0 | Status: DISCONTINUED | OUTPATIENT
Start: 2024-08-06 | End: 2024-08-06

## 2024-08-06 RX ORDER — HYDROMORPHONE HCL IN 0.9% NACL 0.2 MG/ML
0.5 PLASTIC BAG, INJECTION (ML) INTRAVENOUS
Refills: 0 | Status: DISCONTINUED | OUTPATIENT
Start: 2024-08-06 | End: 2024-08-06

## 2024-08-06 RX ORDER — MELATONIN 3 MG
3 TABLET ORAL AT BEDTIME
Refills: 0 | Status: DISCONTINUED | OUTPATIENT
Start: 2024-08-06 | End: 2024-08-06

## 2024-08-06 RX ADMIN — Medication 15 MILLIGRAM(S): at 13:42

## 2024-08-06 RX ADMIN — DEXTROSE MONOHYDRATE, SODIUM CHLORIDE, SODIUM LACTATE, CALCIUM CHLORIDE, MAGNESIUM CHLORIDE 75 MILLILITER(S): 1.5; 538; 448; 18.4; 5.08 SOLUTION INTRAPERITONEAL at 09:24

## 2024-08-06 RX ADMIN — Medication 1000 MILLIGRAM(S): at 10:42

## 2024-08-06 RX ADMIN — PRUCALOPRIDE 2 MILLIGRAM(S): 1 TABLET, FILM COATED ORAL at 13:52

## 2024-08-06 RX ADMIN — Medication 15 MILLIGRAM(S): at 14:12

## 2024-08-06 RX ADMIN — PANTOPRAZOLE SODIUM 40 MILLIGRAM(S): 20 TABLET, DELAYED RELEASE ORAL at 05:08

## 2024-08-06 RX ADMIN — HEPARIN SODIUM 5000 UNIT(S): 1000 INJECTION, SOLUTION INTRAVENOUS; SUBCUTANEOUS at 13:42

## 2024-08-06 RX ADMIN — Medication 400 MILLIGRAM(S): at 10:12

## 2024-08-06 NOTE — DISCHARGE NOTE NURSING/CASE MANAGEMENT/SOCIAL WORK - NSDCPEELIQUISDIET_GEN_ALL_CORE
Eat healthy foods you enjoy. Apixaban/Eliquis DOES NOT have a special diet. Limit your alcohol intake. show

## 2024-08-06 NOTE — ASU DISCHARGE PLAN (ADULT/PEDIATRIC) - CARE PROVIDER_API CALL
Cruz Herrera  Surgery  [address] [place] [id number]  Phone: [phone]  Fax: [phone]  Follow Up Time:

## 2024-08-06 NOTE — PROGRESS NOTE ADULT - SUBJECTIVE AND OBJECTIVE BOX
[facility]  Michael Hua PA-C  [facility] [place] [address] [phone]      INTERVAL HPI/OVERNIGHT EVENTS:  Pt s/e  S/p gastric PPM battery change  +multiple BM after starting bowel regimen yesterday    MEDICATIONS  (STANDING):  heparin   Injectable 5000 Unit(s) SubCutaneous every 8 hours  pantoprazole    Tablet 40 milliGRAM(s) Oral before breakfast  senna 2 Tablet(s) Oral at bedtime    MEDICATIONS  (PRN):  melatonin 3 milliGRAM(s) Oral at bedtime PRN Insomnia  metoclopramide 5 milliGRAM(s) Oral three times a day before meals PRN Gastroparesis  ondansetron Injectable 4 milliGRAM(s) IV Push once PRN Nausea and/or Vomiting      Allergies  No Known Allergies      PHYSICAL EXAM:   Vital Signs:  Vital Signs Last 24 Hrs  T(C): 36.4 (06 Aug 2024 10:38), Max: 37.8 (06 Aug 2024 06:23)  T(F): 97.6 (06 Aug 2024 10:38), Max: 100 (06 Aug 2024 06:23)  HR: 55 (06 Aug 2024 10:38) (55 - 89)  BP: 130/82 (06 Aug 2024 10:38) (93/49 - 133/76)  BP(mean): --  RR: 16 (06 Aug 2024 10:38) (12 - 18)  SpO2: 96% (06 Aug 2024 10:38) (96% - 100%)    Parameters below as of 06 Aug 2024 10:38  Patient On (Oxygen Delivery Method): room air      Daily Height in cm: 182.88 (06 Aug 2024 07:41)    Daily Weight in k.9 (06 Aug 2024 04:58)    GENERAL:  Appears stated age  HEENT:  NC/AT  CHEST:  Full & symmetric excursion  HEART:  Regular rhythm  ABDOMEN:  Soft, non-tender, non-distended  EXTEREMITIES:  no cyanosis  SKIN:  No rash  NEURO:  Alert      LABS:                        12.0   6.40  )-----------( 197      ( 06 Aug 2024 06:27 )             34.5     08-    144  |  109<H>  |  7   ----------------------------<  97  3.6   |  29  |  1.10    Ca    8.7      06 Aug 2024 06:27    TPro  6.3  /  Alb  3.3  /  TBili  0.6  /  DBili  x   /  AST  55<H>  /  ALT  61  /  AlkPhos  63  08-    PT/INR - ( 06 Aug 2024 06:27 )   PT: 12.3 sec;   INR: 1.05 ratio         PTT - ( 06 Aug 2024 06:27 )  PTT:35.8 sec  Urinalysis Basic - ( 06 Aug 2024 06:27 )    Color: x / Appearance: x / SG: x / pH: x  Gluc: 97 mg/dL / Ketone: x  / Bili: x / Urobili: x   Blood: x / Protein: x / Nitrite: x   Leuk Esterase: x / RBC: x / WBC x   Sq Epi: x / Non Sq Epi: x / Bacteria: x

## 2024-08-06 NOTE — CHART NOTE - NSCHARTNOTEFT_GEN_A_CORE
General Surgery Post op Check    Pt seen and examined without complaints. Pain is controlled. Denies SOB/CP/N/V.     Vital Signs Last 24 Hrs  T(C): 36.4 (06 Aug 2024 10:38), Max: 37.8 (06 Aug 2024 06:23)  T(F): 97.6 (06 Aug 2024 10:38), Max: 100 (06 Aug 2024 06:23)  HR: 55 (06 Aug 2024 10:38) (55 - 89)  BP: 130/82 (06 Aug 2024 10:38) (93/49 - 133/76)  BP(mean): --  RR: 16 (06 Aug 2024 10:38) (12 - 18)  SpO2: 96% (06 Aug 2024 10:38) (96% - 100%)    Parameters below as of 06 Aug 2024 10:38  Patient On (Oxygen Delivery Method): room air        I&O's Summary      Physical Exam  Gen: NAD, A&Ox3  Pulm: No respiratory distress, no subcostal retractions  CV: RRR, no JVD  Abd: Soft, NT, ND  Drains: ALEIDA x   Extremities:  FROM, warm and well perfused, equal bilateral muscle strength      A/P: 59y Male s/p gastric stimulator battery exchange.    -DVT prophylaxis w/ SCD.  Encouraged OOB  -Strict I&O's  -Analgesia and antiemetics as needed  -Advance to soft diet this evening  -Dispo:  Discharge home if he tolerates dinner
Called by RN for patient requesting at home Ambien for bedtime.   Patient takes Ambien 12.5mg PO extended release QD according to surescripts and outpatient med rec. No standing order placed.   Ordered Ambien 5mg PO 1x dose.   Primary team to assess the need for standing order.   RN to call if any changes.
Called by RN for patient requesting benadryl to sleep. Patient usually takes ativan at home for insomnia, but only able to tolerate IV.  Received 1x dose of benadryl yesterday.   Ordered benadryl 1x dose.   Primary team to consider any standing medication for insomnia.  RN to call if any changes.
General Surgery consultation requested for SBO.  PMH reviewed via chart and with patient.  PSH remarkable for hiatal hernia repair times three and gastric pacemaker.  Vital signs non suggestive.  Abdomen with mild tenderness diffusely to deep palpation without guarding/ rebound at time of my exam.  Labs overall reassuring with mild leukocytosis and shift only without bandemia.  Chemistry and lactate negative for acidosis.  CT scan report and images personally reviewed and discussed with patient in detail.    Imp/Plan- SBO, likely secondary to adhesions.  Will attempt to manage medically as no immediate indication for surgical intervention.  Recommending medical admission secondary to significant co-morbid conditions.  NG tube recommended and personally offered in the strongest terms to decompress stomach.  Patient requests full unconsciousness for insertion.  I have discussed with his sinus surgery, full anticoagulation with Eliquis today and known gastric wrap, this is medically inappropriate.  He remains polite, but adamant in his refusal.  Discussed with patient and in-house Surgical PA that if emesis occurs, we remain available with 10F/ pediatric NG available.  Will reassess progress and patient's wishes in am.  If stable, can consider Gastrografin challenge.    Please note than more than 50 minutes was spent in face to face time with greater than 50% in counseling and coordination of care.

## 2024-08-06 NOTE — DISCHARGE NOTE PROVIDER - CARE PROVIDERS DIRECT ADDRESSES
,DirectAddress_Unknown ,DirectAddress_Unknown,[email or web address] ,DirectAddress_Unknown,[email or web address]

## 2024-08-06 NOTE — PROGRESS NOTE ADULT - PROVIDER SPECIALTY LIST ADULT
Gastroenterology
Hospitalist
Surgery
Surgery
Gastroenterology
Hospitalist
Surgery
Surgery
Gastroenterology
Gastroenterology
Hospitalist
Surgery
Hospitalist

## 2024-08-06 NOTE — PROGRESS NOTE ADULT - TIME BILLING
Reviewed with patient in detail, Surgical PA team as well as today's RN's.
Reviewed with patient in detail, GI team, Surgical PA's as well as today's RN team.
Reviewed with patient in detail, Hospitalist, Surgical team as well as today's RN team.

## 2024-08-06 NOTE — PROGRESS NOTE ADULT - ASSESSMENT
Pt is a 60 yo M with PMH gastroparesis, hiatal hernia s/p fundoplication x3, gastric pacemaker, Dobbins's esophagus, HTN, diabetes, anemia, COPD, depression, and PTSD admitted for SBO treatment     Small Bowel obstruction  - OR tomorrow for gastric PPM battery change  - NPO after midnight  - Monitor electrolytes on daily BMP  - Pt refusing NGT placement d/t previous sinus surgery  - Aspiration precautions  - Serial abdominal exams  - may give 1g Ofirmev q8 PRN for mild pain  - avoid opiate pain management due to exacerbation of gastroparesis  - IV reglan PRN and Motegrity for gastroparesis  - Zofran PRN for nausea  - f/u surgery recs  - f/u GI recs     GERD  -IV protonix    HTN  -follow BP   -not currently on any meds    Heparin for DVT ppx  Code status: full code  Dispo: home pending hospital course    time spent 45 min  
sbo  +gastric pacemaker     surgery eval noted  patient refusing NGT  Gastrografin study 8/2 no SBO noted  on Full liquid diet  monitor clinical course  no role for  upper gastrointestinal endoscopy at present  outpatient follow up with primary GI after discharge
sbo  +gastric pacemaker     surgery eval noted  Gastrografin study 8/2 no SBO noted  no role for  upper gastrointestinal endoscopy at present  cont bowel regimen and home motegrity  s/p OR for PPM battery change  outpatient follow up with primary GI after discharge    I reviewed the overnight course of events on the unit, re-confirming the patient history. I discussed the care with the patient  Differential diagnosis and plan of care discussed with patient after the evaluation  35 minutes spent on total encounter of which more than fifty percent of the encounter was spent counseling and/or coordinating care by the attending physician.
sbo  +gastric pacemaker     surgery eval noted  patient refusing NGT  Gastrografin study 8/2 no SBO noted  on Full liquid diet  monitor clinical course  no role for  upper gastrointestinal endoscopy at present  Pending OR tomorrow for gastric battery change  outpatient follow up with primary GI after discharge
sbo  +gastric pacemaker     surgery eval noted  patient refusing NGT  Gastrografin study 8/2 no SBO noted  on Full liquid diet  monitor clinical course  no role for  upper gastrointestinal endoscopy at present  pt's family are bringing in pt's home Motegrity  bowel regimen  Pending OR tomorrow for gastric battery change  outpatient follow up with primary GI after discharge    I reviewed the overnight course of events on the unit, re-confirming the patient history. I discussed the care with the patient  Differential diagnosis and plan of care discussed with patient after the evaluation  35 minutes spent on total encounter of which more than fifty percent of the encounter was spent counseling and/or coordinating care by the attending physician.  
A/P:    1.  Small Bowel obstruction  -conservative management per surgery recs  - NPO  - D5 LR w/ 20meq KCl @85cc/hr  - Monitor electrolytes on daily BMP  - Pt refusing NGT placement d/t previous sinus surgery  - Aspiration precautions  - Serial abdominal exams  - 1g Ofirmev q8 PRN for mild pain  - avoid opiate pain management due to exacerbation of gastroparesis  - IV reglan PRN for gastroparesis; EKG qtc 423  - Zofran PRN for nausea  - surgery consulted in ED; appreciate recs  - GI consult (Dr. Modi); appreciate recs.    2.  GERD  -IV protonix    3.  HTN  -follow BP and adjust meds as needed    4.  Heparin for DVT ppx    5.  Code status  -full code
As in above full PA notation except if countered below.  Mr. Zambrano personally seen/ examined during daily rounds.  Denies abdominal pain and reports toleration of diet.  However, passing copious flatus and denies BM to me within last 24 hours.  Vitals non-suggestive.  Abdomen soft without tenderness, though +/- soft distension.  Labs reassuring without acidosis or leukocytosis.  Gastrografin challenge/ SBFT negative for ongoing small bowel obstruction, though fecal retention noted or suggested.  Clinically, improving overall.  Surgically, stable for present.  To continue current supportive care with re-initiation of home regimen of motegrity as well as more formal bowel prep/ stimulation.  Awaiting replacement of gastric stimulator generator/ battery. 
59M, HTN, DM2, hx gastroparesis/gastric PPM, hiatal hernia/fundoplication x3, COPD; adm w/abd pain, n/v - w/up w/WBCs 13s, CTAP w/SBO    SBO, vol depletion  hx gastroparesis/gastric PPM  -CTAP - dilated loops of bowel  -per Surgery - conservative mgmt - recomm NGT - pt adamantly refused  -mgmt w/NPO status, IVFs for vol depletion, prn analgesia, prn antiemetics  -s/p UGI series - neg for obstruction  -pt clinically impvd - passing flatus, tolerating full liquid diet ->further diet advancement today to low fiber  -gastric PPM in place - OR tomorrow 8/5 for battery replacement    leukocytosis - resolved - WBCs 13s->nl- likely reactive - pt w/o fever or other signs/sxs of acute infection - monitoring off abxs    DM - glycemic cvrg w/insulin    HTN - antiHTNves on hold w/npo status; BP in acceptable range at this time - monitoring    COPD - stable resp status - no e/o decompensation    dvt prophy    mobilize oob    
As in above full PA notation.  Mr. Zambrano personally seen/ examined during daily rounds.  Denies further nausea or vomiting.  Denies flatus since admission, but reports resolution of discomfort/ pain.  However, mild "cramping" since initiation of Gastrografin challenge.  Vitals non-suggestive since arrival.  Abdomen soft and free of tenderness at time of this examination.  Labs reassuring with resolution leukocytosis and shift with unremarkable chemistry.  Gastrografin challenge/ SBFT initiated.  Clinically, improving overall.  Surgically, stable for present.  To continue current supportive care with ongoing vitals/ follow-up labs/ serial exams and interval imaging.  Will have gastric pacemaker interrogated this admission.   
As in above full Resident notation.  Mr. Zambrano personally seen/ examined during daily rounds following OR.  Denies abdominal pain and reports multiple large BM's overnight.  Vitals non-suggestive.  Wound clean and abdomen soft with appropriate incisional tenderness only.  Labs reassuring from today.  Clinically, improving overall and surgically stable for present without clinical or radiographic evidence of ongoing obstruction.  To continue current supportive care today with advancement of diet.  If tolerated, no objection to discharge in pm with outpatient office follow-up for post-operative care following gastric stimulator replacement.
59M, HTN, DM2, hx gastroparesis/gastric PPM, hiatal hernia/fundoplication x3, COPD; adm w/abd pain, n/v - w/up w/WBCs 13s, CTAP w/SBO    SBO, vol depletion  hx gastroparesis/gastric PPM  -CTAP - dilated loops of bowel  -per Surgery - conservative mgmt for now - recomm NGT - pt adamantly refusing  -continue NPO status  -continue IVFs for vol depletion  -continue prn analgesia, prn antiemetics  -gastric PPM in place - battery replacement pending    leukocytosis - resolved - WBCs 13s->nl- likely reactive - pt w/o fever or other signs/sxs of acute infection - monitoring off abxs    DM - glycemic cvrg w/insulin    HTN - antiHTNves on hold w/npo status; BP in acceptable range at this time - monitoring    COPD - stable resp status - no e/o decompensation    dvt prophy    mobilize oob

## 2024-08-06 NOTE — DISCHARGE NOTE PROVIDER - DETAILS OF MALNUTRITION DIAGNOSIS/DIAGNOSES
This patient has been assessed with a concern for Malnutrition and was treated during this hospitalization for the following Nutrition diagnosis/diagnoses:     -  08/04/2024: Moderate protein-calorie malnutrition

## 2024-08-06 NOTE — ASU DISCHARGE PLAN (ADULT/PEDIATRIC) - ASU DC SPECIAL INSTRUCTIONSFT
You may shower in 24 hours.  Do not remove wound glue.  Do not let water hit wounds directly, do not rub incisions.      No heavy lifting (nothing heavier than 5 lbs.), no strenuous physical activity, no exercise.      You may perform your usual daily tasks as tolerated.      You may resume your usual daily diet as tolerated.      Do not drive for 24 hours following anesthesia.  Do not drive while taking narcotic pain medications.  Do not drive until pain-free.      For post-operative pain, take the following medications as listed:    -1. Take over-the-counter Extra-Strength Tylenol (500mg pills): take 2 pills every 6 hours regularly for the first 48 hours after surgery, then as needed for mild pain.  Do not exceed 4,000mg a day of acetaminophen.    -2. Take over-the-counter Ibuprofen (200mg pills): take 2-3 pills every 6 hours regularly for the first 48 hours after surgery, then as needed for moderate pain.  TAKE WITH FOOD.    -3. Take prescription Oxycodone (which is a narcotic pain medication): take 1 pill every 6 hours as needed for pain that is not relieved with the Tylenol and Ibuprofen for the first 48 hours after surgery.   *NOTE: you may take all three of these medications together, but space the Tylenol and the Ibuprofen out ((take them 1 hour apart from each other).   Over-the-counter stool-softeners (such as Colace) are recommended to prevent constipation while taking narcotic pain medications - you may take 200mg of Colace 2 times a day.      Follow-up with Dr. Herrera in his office next week - call office for appointment (see below).

## 2024-08-06 NOTE — DISCHARGE NOTE PROVIDER - HOSPITAL COURSE
HPI:  Pt is a 58 yo M with PMH gastroparesis, hiatal hernia s/p fundoplication x3, gastric pacemaker, Dobbins's esophagus, HTN, diabetes, anemia, COPD, depression, and PTSD presents to the ED with worsening abdominal pain for the past several weeks. States he had a URI ~2 weeks ago and a significant cough after which he has had worsening abdominal pain particularly the last 2 days. Endorses tearing sensation and 10/10 pain in the abdomen starting yesterday AM. Has not had a bowel movement for the past 4 days so took miralax and had a small amount of loose stools this AM before the pain worsened. Is still passing gas. Endorsing nausea at baseline, however, worsened for the past 2 days.     Denies fever, chills, chest pain, palpitations, SOB, cough, vomiting, diarrhea, constipation, urinary frequency, urgency, or dysuria, headaches, changes in vision, dizziness, numbness, tingling.  Denies recent travel, recent antibiotic use, or sick contacts.    ED Course:   Vitals: BP: 175/50, HR: 78, Temp: 98, RR: 16, SpO2: 98% on   Labs:  WBC 13.12, H/H 13.5 39.4, Na 140, K 3.7  UA: Negative  CXR: Clear lungs as per personal read, official read pending   CT: Status post hiatal hernia repair. Normal appendix. There are   dilated loops of fluid-filled small bowel measuring up to 3.7 cm. There   is a transition point in the mid abdomen (601:43) (301:73). No   significant bowel wall thickening. PERITONEUM/RETROPERITONEUM: There is a   gastric pacemaker device in the anterior right subcutaneous tissues with   the lead extending to the stomach. There is nearby short tubular   structure and another similar tubular structureseen in the pelvis which   are of uncertain etiology but unchanged. There is trace perisplenic   ascites and trace free fluid in the pelvis.    EKG: NSR @ 83  Received in the ED: s/p 4mg IV Zofran x1, 1g IV Ofirmev x1, 1mg IV Dilaudid x2, 4mg IV morphine x1, 1L 0.9% NS bolus x1   (02 Aug 2024 02:21)      ---  HOSPITAL COURSE: Patient admitted to medicine floor for management of     Pt seen and examined on day of discharge. Patient is medically optimized for discharge to home with close outpatient followup.    PHYSICAL EXAM ON DAY OF DISCHARGE:        ---  CONSULTANTS:     ---  TIME SPENT:  I, the attending physician, was physically present for the key portions of the evaluation and management (E/M) service provided. The total amount of time spent reviewing the hospital notes, laboratory values, imaging findings, assessing/counseling the patient, discussing with consultant physicians, social work, nursing staff was -- minutes    ---  Primary care provider was made aware of plan for discharge:      [  ] NO     [  ] YES   HPI:  Pt is a 58 yo M with PMH gastroparesis, hiatal hernia s/p fundoplication x3, gastric pacemaker, Dobbins's esophagus, HTN, diabetes, anemia, COPD, depression, and PTSD presents to the ED with worsening abdominal pain for the past several weeks. States he had a URI ~2 weeks ago and a significant cough after which he has had worsening abdominal pain particularly the last 2 days. Endorses tearing sensation and 10/10 pain in the abdomen starting yesterday AM. Has not had a bowel movement for the past 4 days so took miralax and had a small amount of loose stools this AM before the pain worsened. Is still passing gas. Endorsing nausea at baseline, however, worsened for the past 2 days.     Denies fever, chills, chest pain, palpitations, SOB, cough, vomiting, diarrhea, constipation, urinary frequency, urgency, or dysuria, headaches, changes in vision, dizziness, numbness, tingling. Denies recent travel, recent antibiotic use, or sick contacts.    ED Course:   Vitals: BP: 175/50, HR: 78, Temp: 98, RR: 16, SpO2: 98% on   Labs:  WBC 13.12, H/H 13.5 39.4, Na 140, K 3.7  UA: Negative  CXR: Clear lungs as per personal read, official read pending   CT: Status post hiatal hernia repair. Normal appendix. There are   dilated loops of fluid-filled small bowel measuring up to 3.7 cm. There   is a transition point in the mid abdomen (601:43) (301:73). No   significant bowel wall thickening. PERITONEUM/RETROPERITONEUM: There is a   gastric pacemaker device in the anterior right subcutaneous tissues with   the lead extending to the stomach. There is nearby short tubular   structure and another similar tubular structureseen in the pelvis which   are of uncertain etiology but unchanged. There is trace perisplenic   ascites and trace free fluid in the pelvis.    EKG: NSR @ 83  Received in the ED: s/p 4mg IV Zofran x1, 1g IV Ofirmev x1, 1mg IV Dilaudid x2, 4mg IV morphine x1, 1L 0.9% NS bolus x1   (02 Aug 2024 02:21)      ---  HOSPITAL COURSE: Patient admitted to medicine floor for management of SBO. Pt refused NGT placement d/t previous sinus surgery. He had gastric PPM battery change in OR today 8/6 and tolerating diet. Diet to be advanced to soft diet tonight and if able to tolerate, may be discharged home.   Pt seen and examined on day of discharge. Patient is medically optimized for discharge to home with close outpatient followup.    PLAN:   DC home today  f/u GI outpatient  f/u surgery outpatient  f/u PCP within 1 week  Continue home meds  Toradol script ordered

## 2024-08-06 NOTE — DISCHARGE NOTE NURSING/CASE MANAGEMENT/SOCIAL WORK - PATIENT PORTAL LINK FT
You can access the FollowMyHealth Patient Portal offered by [facility] by registering at the following website: [email or web address]. By joining [employer]’s FollowMyHealth portal, you will also be able to view your health information using other applications (apps) compatible with our system.

## 2024-08-06 NOTE — DISCHARGE NOTE PROVIDER - PROVIDER TOKENS
PROVIDER:[TOKEN:[[id number]],FOLLOWUP:[1 week]] PROVIDER:[TOKEN:[[id number]],FOLLOWUP:[1 week]],PROVIDER:[TOKEN:[[id number]]] PROVIDER:[TOKEN:[[id number]],FOLLOWUP:[1 week]],PROVIDER:[TOKEN:[[id number]]],PROVIDER:[TOKEN:[[id number]]]

## 2024-08-06 NOTE — PRE-OP CHECKLIST - AICD PRESENT
SUBJECTIVE:       HPI: Brigitte Robles is a 45 year old  who presents today for presents today for consultation regarding abnormal US, referral from Kehr, Kristen M .      Menses every 21-25 days, lasting 7-8 days. Flow heavy with large clots with overflow bleeding. + associated cramping.   Menarche 16yo. Has tried taking OCPs with unwanted SE.   Patient is sexually active. One male partner. She is using vasectomy for contraception.    +fatigue with BRITTANY. On iron supplements.  She denies vaginal discharge, dyspareunia. She denies fevers/chills, nausea/vomiting, abdominal pain or bloating. +urgency, pelvic pressure. Denies dysuria, hematuria, constipation or diarrhea.        Ob Hx:  s/p SVDx2.      Gyn Hx: Patient's last menstrual period was 2021 (approximate).     Last pap was 21 nil, neg hpv            reports that she has never smoked. She has never used smokeless tobacco.      Today's PHQ-2 Score:   PHQ-2 (  [employer]) 2021   Q1: Little interest or pleasure in doing things 0   Q2: Feeling down, depressed or hopeless 0   PHQ-2 Score 0   Q1: Little interest or pleasure in doing things Not at all   Q2: Feeling down, depressed or hopeless Not at all   PHQ-2 Score 0     Today's PHQ-9 Score:   PHQ-9 SCORE 2021   PHQ-9 Total Score -   PHQ-9 Total Score MyChart -   PHQ-9 Total Score 0     Today's MONTSERRAT-7 Score:   MONTSERRAT-7 SCORE 2021   Total Score -   Total Score -   Total Score 0       Problem list and histories reviewed & adjusted, as indicated.  Additional history: as documented.    Patient Active Problem List   Diagnosis     Depressive disorder, not elsewhere classified     Hypothyroidism     GLUCOCORTICOID DEFICIENT     Anxiety     CARDIOVASCULAR SCREENING; LDL GOAL LESS THAN 160     Seasonal allergic rhinitis     Type 1 diabetes mellitus without complication (H)     Morbid obesity (H)     MONTSERRAT (generalized anxiety disorder)     Mild major depression (H)     Metrorrhagia     Past  Surgical History:   Procedure Laterality Date     HC REMOVAL OF TONSILS,12+ Y/O        Social History     Tobacco Use     Smoking status: Never Smoker     Smokeless tobacco: Never Used   Substance Use Topics     Alcohol use: Yes     Comment: rare      Problem (# of Occurrences) Relation (Name,Age of Onset)    Coronary Artery Disease (1) Maternal Grandfather (Aman Conte)    Depression (1) Sister (mrs)    Diabetes (9) Mother (Rosio Hampton), Father (Pedro Hampton): 1, Sister, Maternal Grandmother (Lucía Conte): 2, Paternal Aunt, Paternal Uncle, Brother (Elgin Hampton): 1, Nephew (Salo Hampton): 1, Sister (mrs): 1    Heart Disease (2) Maternal Grandmother (Lucía Conte): TRIPLE BYPASS, Maternal Grandfather (Aman Conte): TRIPLE BYPASS    Hypertension (1) Mother (Rosio Hampton)    Thyroid Disease (8) Mother (Rosio Hampton), Father (Pedro Hampton): BORIS'S, Sister, Maternal Grandmother (Lucía Conte), Maternal Grandfather (Aman Conte), Brother (mr), Brother (Elgin Hampton), Sister (mrs)       Negative family history of: Breast Cancer, Cancer - colorectal            atorvastatin (LIPITOR) 10 MG tablet, Take 1 tablet (10 mg) by mouth daily  blood glucose monitoring (ONETOUCH VERIO IQ) test strip, Use to test blood sugar 10 times daily or as directed.  citalopram (CELEXA) 20 MG tablet, Take 1 tablet (20 mg) by mouth daily  Continuous Blood Gluc  (DEXCOM G6 ) MAGGY, 1 Device continuous  Continuous Blood Gluc Sensor (DEXCOM G6 SENSOR) MISC, 1 each See Admin Instructions Change sensor every 10 days.  Continuous Blood Gluc Transmit (DEXCOM G6 TRANSMITTER) MISC, 1 each every 3 months  insulin aspart (NOVOLOG FLEXPEN) 100 UNIT/ML pen, USE 1 UNIT PER 10 GM CARB WITH BREAKFAST AND LUNCH AND 1 UNIT PER 8 GM CARB W/DINNER -=50UNITS/DAY  insulin aspart (NOVOLOG VIAL) 100 UNITS/ML vial, Use as directed via insulin pump. Total daily dose approx 80 units.  insulin degludec (TRESIBA FLEXTOUCH) 200 UNIT/ML pen, INJECT  30 UNITS SUBCUTANEOUS DAILY  insulin pen needle (B-D U/F) 31G X 5 MM miscellaneous, USE 4 DAILY OR AS DIRECTED.  INSULIN PUMP - OUTPATIENT, Date Last Updated: 1/25/2021  Tandem  Pump Website Username: [email or web address]  Pump Website Password: [email or web address]  lisinopril (ZESTRIL) 5 MG tablet, Take 1 tablet (5 mg) by mouth daily  ONETOUCH DELICA LANCETS 33G MISC, 1 Box 6 times daily  SYNTHROID 125 MCG tablet, Synthroid 125 mcg,  1 pill for 6-day and half a pill 1 day a week  glucagon (GLUCAGON EMERGENCY) 1 MG kit, Inject 1 mg into the muscle once for 1 dose    No current facility-administered medications on file prior to visit.    Allergies   Allergen Reactions     Levothyroxine      Dizzy to generic --- not to synthroid       ROS:  10 Point review of systems negative other noted above in HPI    OBJECTIVE:     BP (!) 162/82 (BP Location: Right arm, Patient Position: Sitting, Cuff Size: Adult Large)   Pulse 75   Wt 104.8 kg (231 lb)   LMP 09/22/2021 (Approximate)   SpO2 97%   BMI 37.28 kg/m    Body mass index is 37.28 kg/m .      Gen: Alert, oriented, appropriately interactive, NAD  Eyes: Eyes grossly normal to inspection, conjunctivae and sclerae normal  CV: No edema  Resp: no audible wheeze, cough, or visible cyanosis.  No visible retractions or increased work of breathing.  Able to speak fully in complete sentences.  Abdomen:  non tender, non distended, no masses, no hernias, soft  External genitalia: no lesions; normal appearing external genitalia, bartholins glands, urethra, skenes glands  Vagina: no masses or lesions or discharge, normally rugated.  Cervix: no masses or lesions or discharge +cervical polyp  Bimanual exam:   Nontender pelvic floor muscles  Urethra: nontender   Bladder: nontender and without massess, well supported   Uterus: midline, anteverted, small, mobile  no masses, non-tender  Adnexa: no masses or tenderness appreciated   No cervical motion tenderness  MSK: normal gait,  symmetric movements UE & LE  Lower extremities: non-tender, no edema  Neuro: Cranial nerves grossly intact, mentation intact and speech normal  Psych: mentation appears normal, affect normal/bright, judgement and insight intact, normal speech and appearance well-groomed        In-Clinic Test Results:  No results found for this or any previous visit (from the past 24 hour(s)).   EXAMINATION: US PELVIC COMPLETE WITH TRANSVAGINAL, 2021 3:09 PM      COMPARISON: None.     HISTORY: Metrorrhagia     TECHNIQUE: The pelvis was scanned in standard fashion with  transabdominal and transvaginal transducer(s) using both grey scale  and limited color Doppler techniques.     FINDINGS:  The uterus measures 5.4 cm x 10.0 cm x 4.8 cm, and there is no  evidence of a focal fibroid.  The myometrium is somewhat heterogeneous  which may represent a component of underlying adenomyosis.  The  endometrium is within normal limits and measures 5 mm. There is no  free fluid in the pelvis.  Cervical nabothian cyst.     The right ovary measures 2.4 cm x 3.1 cm x 2.3 cm and the left ovary  measures 2.0 cm x 3.1 cm x 2.7 cm. There is no adnexal mass. There is  normal blood flow to the ovaries.                                                                      IMPRESSION:   1.  No discrete uterine fibroid.  The myometrium is somewhat  heterogeneous which may represent a component of underlying  adenomyosis.  2.  Otherwise negative pelvic ultrasound.     MILIND WYATT MD         SYSTEM ID:  [id number]    ASSESSMENT/PLAN:                                                      Birgitte Robles is a 45 year old  who presents today for consultation for AUB, referral from Kehr, Kristen M       ICD-10-CM    1. Abnormal uterine bleeding (AUB)  N93.9 Surgical Pathology Exam     Viky-Operative Worksheet     ENDOMETRIAL BIOPSY W/O CERVICAL DILATION   2. Metrorrhagia  N92.1 Ob/Gyn Referral   3. Abnormal ultrasound  R93.89 Ob/Gyn Referral   4. Cervical  polyp  N84.1 Surgical Pathology Exam     BIOPSY/EXCIS CERVICAL LESION W/WO FULGURATION       AUB, likely adenomyosis. Options for management reviewed in detail, including medical and surgical treatment. Patient is interested in definitive management, and declines medical or endometrial ablation. Routes of hysterectomy reviewed, recommending TLH/BS, possible cystoscopy. No prior surgical history. Details of the procedure were discussed with the patient.  Risks include, but are not limited to, bleeding, infection, and injury to surrounding organs such as the bowel, urinary system, nerves, and blood vessels.  Injury may result in repair at the time of the surgery or in a separate procedure.  All questions answered, and accepting these risks, the patient elects to proceed with the procedure.   Pre-op EMB recommended given bleeding pattern, procedure note below.    Cervical polyp noted on exam today, removal also recommended. Procedure note below.      Thank you for allowing me to participate in the care of your patient.           I personally reviewed her Primary care provider notes, pelvic ultrasound.    45 minutes spent on the date of the encounter doing chart review, history and exam, documentation and further activities as noted above, not including time spent for procedures    Mireya Harman [facility]     INDICATIONS:                                                      Is a pregnancy test required: No.  Was a consent obtained?  Yes    Today's PHQ-2 Score:   PHQ-2 ( 1999 [employer]izer) 9/20/2021   Q1: Little interest or pleasure in doing things 0   Q2: Feeling down, depressed or hopeless 0   PHQ-2 Score 0   Q1: Little interest or pleasure in doing things Not at all   Q2: Feeling down, depressed or hopeless Not at all   PHQ-2 Score 0       The patient was advised of the risks, benefits and alternatives of the cervical polyp removal procedure and her questions were answered.      PROCEDURE:                                                       A speculum was placed into the vagina and the cervical polyp was identified. Using ring forceps, the polyp was grasped and removed without difficulty.    POST PROCEDURE:                                                      The polyp was sent to pathology for cytologic analysis.  There was no bleeding. The patient tolerated the procedure well and there were no complications. The patient was discharged to home in stable condition.    Patient advised to call the clinic if heavy bleeding occurs.    Mireya Harman DO and   INDICATIONS:                                                    Is a pregnancy test required: No.  Was a consent obtained?  Yes    Having endometrial biopsy for abnormal uterine bleeding    Today's PHQ-2 Score:   PHQ-2 ( 1999 Pfizer) 9/20/2021   Q1: Little interest or pleasure in doing things 0   Q2: Feeling down, depressed or hopeless 0   PHQ-2 Score 0   Q1: Little interest or pleasure in doing things Not at all   Q2: Feeling down, depressed or hopeless Not at all   PHQ-2 Score 0       PROCEDURE;                                                      A speculum was placed in the vagina and cervix prepped with betadine. A tenaculum was attached to the cervix. A small plastic 5 mm Pipelle syringe curette was inserted into the cervical canal. The uterus was sounded to 8 cm's. A vigorous four quadrant biopsy was performed, removing amount moderate  of tissue. The speculum was removed. This tissue was placed in Formalin and sent to pathology.    The patient tolerated the procedure  well.      POST PROCEDURE;                                                      There  was no cramping at the time of discharge. She  tolerated the procedure well. There were no complications. Patient was discharged in stable condition.    Patient advised to call the clinic if severe pelvic pain, fever or heavy bleeding.    Mireya Harman DO   no

## 2024-08-06 NOTE — DISCHARGE NOTE PROVIDER - NSDCFUADDINST_GEN_ALL_CORE_FT
DC home today  f/u GI outpatient  f/u surgery outpatient DC home today  f/u GI outpatient  f/u surgery outpatient  f/u PCP within 1 week  Continue home meds  Toradol script ordered

## 2024-08-06 NOTE — DISCHARGE NOTE PROVIDER - CARE PROVIDER_API CALL
Ashvin Ca  Gastroenterology  [address] [place] [address]  Phone: [phone]  Fax: [phone]  Follow Up Time: 1 week   Ashvin Ca  Gastroenterology  [address] [place] [address]  Phone: [phone]  Fax: [phone]  Follow Up Time: 1 week    Lucero Del Angel  [facility]  [address], [address]  [place] [address]  Phone: [phone]  Fax: [phone]  Follow Up Time:    Ashvin Ca  Gastroenterology  [address] [place] [address]  Phone: [phone]  Fax: [phone]  Follow Up Time: 1 week    Lucero Del Angel  [facility]  [address]  [place] [address]  Phone: [phone]  Fax: [phone]  Follow Up Time:     Cruz Herrera  Surgery  [address] [place] [address]  Phone: [phone]  Fax: [phone]  Follow Up Time:

## 2024-08-06 NOTE — PROGRESS NOTE ADULT - SUBJECTIVE AND OBJECTIVE BOX
POD#0 s/p gastric stimulator battery exchange    SUBJECTIVE:  Patient seen and examined at bedside. POD0 gastric stim battery exchange.  Patient reports no new complaints at this time.  Admits to flatus and BM.  Voiding, ambulating and tolerating diet.  Patient denies any fever, chills, chest pain, shortness of breath, nausea, vomiting, or urinary complaints.    VITALS  Vital Signs Last 24 Hrs  T(C): 36.4 (06 Aug 2024 09:05), Max: 37.8 (06 Aug 2024 06:23)  T(F): 97.5 (06 Aug 2024 09:05), Max: 100 (06 Aug 2024 06:23)  HR: 62 (06 Aug 2024 09:20) (62 - 89)  BP: 93/56 (06 Aug 2024 09:20) (93/49 - 133/76)  BP(mean): --  RR: 13 (06 Aug 2024 09:20) (12 - 18)  SpO2: 98% (06 Aug 2024 09:20) (97% - 100%)    Parameters below as of 06 Aug 2024 09:20  Patient On (Oxygen Delivery Method): room air        PHYSICAL EXAM  GENERAL:  Well-nourished, well-developed Male lying comfortably in bed in [facility].  HEENT:  NC/AT. Sclera white. Mucous membranes moist.  CARDIO:  Regular rate and rhythm.  No murmur, gallop or rub appreciated.  RESPIRATORY:  Nonlabored breathing, no accessory muscle use. Lungs clear to auscultation bilaterally.  No wheezing, rales or rhonchi appreciated.  ABDOMEN:  Abdominal incision covered with dermabond. Soft, nondistended, nontender. BS appreciated on auscultation.  No rebound tenderness or guarding.  EXTREMITIES: No calf tenderness bilaterally.  SKIN:  No jaundice, pallor, or cyanosis  NEURO:  A&O x 3    INTAKE & OUTPUT  I&O's Summary    I&O's Detail      MEDICATIONS  MEDICATIONS  (STANDING):  acetaminophen   IVPB .. 1000 milliGRAM(s) IV Intermittent once  lactated ringers. 1000 milliLiter(s) (75 mL/Hr) IV Continuous <Continuous>    MEDICATIONS  (PRN):  HYDROmorphone  Injectable 0.5 milliGRAM(s) IV Push every 10 minutes PRN Severe Pain (7 - 10)  ondansetron Injectable 4 milliGRAM(s) IV Push once PRN Nausea and/or Vomiting      LABS:                        12.0   6.40  )-----------( 197      ( 06 Aug 2024 06:27 )             34.5     08-06    144  |  109<H>  |  7   ----------------------------<  97  3.6   |  29  |  1.10    Ca    8.7      06 Aug 2024 06:27    TPro  6.3  /  Alb  3.3  /  TBili  0.6  /  DBili  x   /  AST  55<H>  /  ALT  61  /  AlkPhos  63  08-06    PT/INR - ( 06 Aug 2024 06:27 )   PT: 12.3 sec;   INR: 1.05 ratio         PTT - ( 06 Aug 2024 06:27 )  PTT:35.8 sec      ASSESSMENT & PLAN  59yM with PMHx of HTN, b/l PE (on eliquis), gastroparesis, Dobbins's esophagus, anemia, COPD, Depression, PTSD, and PSHx of fundoplication x 3 (repair of recurrent hiatal hernia), gastric pacemaker w/ Dr. Herrera 2018 and "sinus surgery" admitted for abdominal pain, CT with SBO. Patient reports BM/passing flatus. Tolerating diet. POD0 gastric pacemaker battery exchange.     PLAN:  - Advance diet as tolerated  - f/u AM labs  - Serial abdominal exams  - DVT prophylaxis with SQH  - OOB, pain control

## 2024-08-06 NOTE — PROGRESS NOTE ADULT - NUTRITIONAL ASSESSMENT
This patient has been assessed with a concern for Malnutrition and has been determined to have a diagnosis/diagnoses of Moderate protein-calorie malnutrition.    This patient is being managed with:   Diet NPO after Midnight-     NPO Start Date: 05-Aug-2024   NPO Start Time: 23:59  Entered: Aug  5 2024  8:13AM    Diet Low Fiber-  Entered: Aug  4 2024  8:52AM  
This patient has been assessed with a concern for Malnutrition and has been determined to have a diagnosis/diagnoses of Moderate protein-calorie malnutrition.    This patient is being managed with:   Diet Clear Liquid-  Entered: Aug  6 2024  9:26AM  
This patient has been assessed with a concern for Malnutrition and has been determined to have a diagnosis/diagnoses of Moderate protein-calorie malnutrition.    This patient is being managed with:   Diet Low Fiber-  Entered: Aug  4 2024  8:52AM    Diet NPO after Midnight-     NPO Start Date: 04-Aug-2024   NPO Start Time: 23:59  Entered: Aug  4 2024  8:52AM

## 2024-08-06 NOTE — CASE MANAGEMENT PROGRESS NOTE - NSCMPROGRESSNOTE_GEN_ALL_CORE
Per MD, patient is medically cleared for discharge home today.  CM met with patient to discussed discharge disposition is home without homecare services. Discharge notice signed and copy given to patient. Son to transport patient home. Patient verbalized understanding of the transition plan and is in agreement. CM answered all questions to the best of my abilities. CM remains available throughout hospital stay.

## 2024-08-06 NOTE — DISCHARGE NOTE PROVIDER - NSDCCPCAREPLAN_GEN_ALL_CORE_FT
PRINCIPAL DISCHARGE DIAGNOSIS  Diagnosis: Small bowel obstruction  Assessment and Plan of Treatment:       SECONDARY DISCHARGE DIAGNOSES  Diagnosis: HTN (hypertension)  Assessment and Plan of Treatment:     Diagnosis: GERD (gastroesophageal reflux disease)  Assessment and Plan of Treatment:

## 2024-08-13 ENCOUNTER — APPOINTMENT (OUTPATIENT)
Dept: SURGERY | Facility: CLINIC | Age: 59
End: 2024-08-13
Payer: COMMERCIAL

## 2024-08-13 VITALS
OXYGEN SATURATION: 100 % | WEIGHT: 194 LBS | SYSTOLIC BLOOD PRESSURE: 129 MMHG | HEART RATE: 69 BPM | HEIGHT: 72 IN | DIASTOLIC BLOOD PRESSURE: 78 MMHG | BODY MASS INDEX: 26.28 KG/M2

## 2024-08-13 PROCEDURE — 99024 POSTOP FOLLOW-UP VISIT: CPT

## 2024-09-05 ENCOUNTER — RX RENEWAL (OUTPATIENT)
Age: 59
End: 2024-09-05

## 2024-09-12 ENCOUNTER — RX RENEWAL (OUTPATIENT)
Age: 59
End: 2024-09-12

## 2024-09-16 NOTE — ASU PATIENT PROFILE, ADULT - FALL HARM RISK - UNIVERSAL INTERVENTIONS
Bed in lowest position, wheels locked, appropriate side rails in place/Call bell, personal items and telephone in reach/Instruct patient to call for assistance before getting out of bed or chair/Non-slip footwear when patient is out of bed/[place] to call system/Physically safe environment - no spills, clutter or unnecessary equipment/Purposeful Proactive Rounding/Room/bathroom lighting operational, light cord in reach

## 2024-09-17 ENCOUNTER — TRANSCRIPTION ENCOUNTER (OUTPATIENT)
Age: 59
End: 2024-09-17

## 2024-09-17 ENCOUNTER — RESULT REVIEW (OUTPATIENT)
Age: 59
End: 2024-09-17

## 2024-09-17 ENCOUNTER — APPOINTMENT (OUTPATIENT)
Dept: RADIOLOGY | Facility: HOSPITAL | Age: 59
End: 2024-09-17

## 2024-09-17 ENCOUNTER — APPOINTMENT (OUTPATIENT)
Dept: GASTROENTEROLOGY | Facility: HOSPITAL | Age: 59
End: 2024-09-17

## 2024-09-17 ENCOUNTER — OUTPATIENT (OUTPATIENT)
Dept: OUTPATIENT SERVICES | Facility: HOSPITAL | Age: 59
LOS: 1 days | Discharge: HOME | End: 2024-09-17
Payer: MEDICARE

## 2024-09-17 VITALS
SYSTOLIC BLOOD PRESSURE: 143 MMHG | HEART RATE: 57 BPM | RESPIRATION RATE: 20 BRPM | TEMPERATURE: 98 F | DIASTOLIC BLOOD PRESSURE: 78 MMHG | OXYGEN SATURATION: 100 % | WEIGHT: 190.04 LBS

## 2024-09-17 VITALS
SYSTOLIC BLOOD PRESSURE: 137 MMHG | HEART RATE: 88 BPM | RESPIRATION RATE: 19 BRPM | OXYGEN SATURATION: 99 % | DIASTOLIC BLOOD PRESSURE: 90 MMHG

## 2024-09-17 DIAGNOSIS — Z98.890 OTHER SPECIFIED POSTPROCEDURAL STATES: Chronic | ICD-10-CM

## 2024-09-17 DIAGNOSIS — Z87.19 PERSONAL HISTORY OF OTHER DISEASES OF THE DIGESTIVE SYSTEM: Chronic | ICD-10-CM

## 2024-09-17 DIAGNOSIS — Z96.89 PRESENCE OF OTHER SPECIFIED FUNCTIONAL IMPLANTS: Chronic | ICD-10-CM

## 2024-09-17 DIAGNOSIS — R07.89 OTHER CHEST PAIN: ICD-10-CM

## 2024-09-17 DIAGNOSIS — R13.10 DYSPHAGIA, UNSPECIFIED: ICD-10-CM

## 2024-09-17 DIAGNOSIS — Z90.89 ACQUIRED ABSENCE OF OTHER ORGANS: Chronic | ICD-10-CM

## 2024-09-17 PROCEDURE — 71250 CT THORAX DX C-: CPT | Mod: 26,MC

## 2024-09-17 PROCEDURE — 88305 TISSUE EXAM BY PATHOLOGIST: CPT | Mod: 26

## 2024-09-17 PROCEDURE — 43233 EGD BALLOON DIL ESOPH30 MM/>: CPT

## 2024-09-17 PROCEDURE — 88312 SPECIAL STAINS GROUP 1: CPT | Mod: 26

## 2024-09-17 DEVICE — CATH ENDOFLIP MEASURE 16MM: Type: IMPLANTABLE DEVICE | Status: FUNCTIONAL

## 2024-09-17 DEVICE — IMPLANTABLE DEVICE: Type: IMPLANTABLE DEVICE | Status: FUNCTIONAL

## 2024-09-17 RX ORDER — OXYCODONE HYDROCHLORIDE 5 MG/1
5 CAPSULE ORAL
Qty: 10 | Refills: 0 | Status: ACTIVE | COMMUNITY
Start: 2024-09-17 | End: 1900-01-01

## 2024-09-17 RX ORDER — FENTANYL CITRATE 50 UG/ML
50 INJECTION INTRAMUSCULAR; INTRAVENOUS ONCE
Refills: 0 | Status: DISCONTINUED | OUTPATIENT
Start: 2024-09-17 | End: 2024-09-17

## 2024-09-17 RX ADMIN — FENTANYL CITRATE 50 MICROGRAM(S): 50 INJECTION INTRAMUSCULAR; INTRAVENOUS at 12:36

## 2024-09-17 RX ADMIN — Medication 2 MILLIGRAM(S): at 09:58

## 2024-09-17 RX ADMIN — Medication 2 MILLIGRAM(S): at 10:30

## 2024-09-17 RX ADMIN — Medication 30 MILLILITER(S): at 09:00

## 2024-09-17 NOTE — PRE PROCEDURE NOTE - PRE PROCEDURE EVALUATION
Pre-Endoscopy Evaluation    Referring Physician:                                    Indication for Procedure:    Sedation by Anesthesia [X ]    PAST MEDICAL & SURGICAL HISTORY:  HTN (hypertension)      Reactive airway disease      Asthma      GERD (gastroesophageal reflux disease)      Hiatal hernia      Diabetes mellitus      Anxiety  PTSD - [employer] [facility] responnder      Low testosterone in male      Osteoarthritis      Intervertebral disc disorder  bulging discs cervical and lumbar region      Shoulder pain, left      Depression      PE (pulmonary thromboembolism)      Insomnia      Gastroparesis      COVID-19 virus infection  h/o 4/2020      COVID-19 vaccine series completed      H/O submucous nasal surgery  septoplasty      S/P laparoscopic fundoplication  2x      S/P Achilles tendon repair  right - done 2x      S/P carpal tunnel release  right      S/P tonsillectomy and adenoidectomy      H/O hiatal hernia  repaired      Presence of gastric pacemaker  2018          Latex allergy: [ ] yes [X] no    Smoking: [ ] yes  [X] no    AICD/PPM: [ ] yes   [X] no    Pertinent lab data:                      Physical Examination:  Daily     Daily   Vital Signs Last 24 Hrs  T(C): 36.6 (17 Sep 2024 08:05), Max: 36.6 (17 Sep 2024 08:05)  T(F): 97.8 (17 Sep 2024 08:05), Max: 97.8 (17 Sep 2024 08:05)  HR: 57 (17 Sep 2024 08:05) (57 - 57)  BP: 143/78 (17 Sep 2024 08:05) (143/78 - 143/78)  BP(mean): --  RR: 20 (17 Sep 2024 08:05) (20 - 20)  SpO2: 100% (17 Sep 2024 08:05) (100% - 100%)        Constitutional: NAD    HEENT: PERRLA, EOMI,       Neck:  No JVD    Respiratory: CTAB/L    Cardiovascular: S1 and S2    Gastrointestinal: BS+, soft, NT/ND    Extremities: No peripheral edema    Neurological: A/O x 3, no focal deficits    Psychiatric: Normal mood, normal affect    : No Jiménez    Skin: No rashes    Comments:    ASA Class: I [ ]  II [ X]  III [ ]  IV [ ]    The patient is a suitable candidate for the planned procedure unless box checked [ ]  No, explain:

## 2024-09-17 NOTE — ASU DISCHARGE PLAN (ADULT/PEDIATRIC) - PLEASE INDICATE TEMPERATURE IN FAHRENHEIT OR CELSIUS
What Is The Reason For Today's Visit?: Surveillance against skin cancer recurrences [place] Depth?: 0.2mm Year Excised?: 2021 100.5

## 2024-09-25 LAB — SURGICAL PATHOLOGY STUDY: SIGNIFICANT CHANGE UP

## 2024-09-29 PROCEDURE — 85027 COMPLETE CBC AUTOMATED: CPT

## 2024-09-29 PROCEDURE — 86901 BLOOD TYPING SEROLOGIC RH(D): CPT

## 2024-09-29 PROCEDURE — 86850 RBC ANTIBODY SCREEN: CPT

## 2024-09-29 PROCEDURE — 74250 X-RAY XM SM INT 1CNTRST STD: CPT

## 2024-09-29 PROCEDURE — 83735 ASSAY OF MAGNESIUM: CPT

## 2024-09-29 PROCEDURE — 85730 THROMBOPLASTIN TIME PARTIAL: CPT

## 2024-09-29 PROCEDURE — 74177 CT ABD & PELVIS W/CONTRAST: CPT | Mod: MC

## 2024-09-29 PROCEDURE — 84100 ASSAY OF PHOSPHORUS: CPT

## 2024-09-29 PROCEDURE — 96376 TX/PRO/DX INJ SAME DRUG ADON: CPT

## 2024-09-29 PROCEDURE — 80048 BASIC METABOLIC PNL TOTAL CA: CPT

## 2024-09-29 PROCEDURE — 83690 ASSAY OF LIPASE: CPT

## 2024-09-29 PROCEDURE — 36415 COLL VENOUS BLD VENIPUNCTURE: CPT

## 2024-09-29 PROCEDURE — 80053 COMPREHEN METABOLIC PANEL: CPT

## 2024-09-29 PROCEDURE — 81003 URINALYSIS AUTO W/O SCOPE: CPT

## 2024-09-29 PROCEDURE — 96374 THER/PROPH/DIAG INJ IV PUSH: CPT

## 2024-09-29 PROCEDURE — 96375 TX/PRO/DX INJ NEW DRUG ADDON: CPT

## 2024-09-29 PROCEDURE — 71045 X-RAY EXAM CHEST 1 VIEW: CPT

## 2024-09-29 PROCEDURE — 74019 RADEX ABDOMEN 2 VIEWS: CPT

## 2024-09-29 PROCEDURE — 85610 PROTHROMBIN TIME: CPT

## 2024-09-29 PROCEDURE — 86900 BLOOD TYPING SEROLOGIC ABO: CPT

## 2024-09-29 PROCEDURE — 85025 COMPLETE CBC W/AUTO DIFF WBC: CPT

## 2024-09-29 PROCEDURE — C1767: CPT

## 2024-09-29 PROCEDURE — 99285 EMERGENCY DEPT VISIT HI MDM: CPT | Mod: 25

## 2024-09-29 PROCEDURE — 83605 ASSAY OF LACTIC ACID: CPT

## 2024-09-29 PROCEDURE — 93005 ELECTROCARDIOGRAM TRACING: CPT

## 2024-11-07 NOTE — H&P PST ADULT - PAIN ASSESSMENT COMPLETED
Pt calls to reports constipation that has been lasting about 2 weeks. Reports the last time he had a BM was this morning but stool was very hard. Pt reports color is normal, denies abdominal pain, N/V.       Pt reports trying stool softeners at home at home with minimal results. Pt isn't able to discuss what he has been using to help with this as he is not at home.     Pt triaged      Reason for Disposition   Constipation is a recurrent ongoing problem (i.e., < 3 BMs / week or straining > 25% of the time)    Protocols used: Constipation-A-OH    Pt transferred to PSR for scheduling.    Yes

## 2025-01-13 NOTE — ASU PREOP CHECKLIST - WEIGHT IN KG
Chief Complaint  Diabetes, janelle feet  (Cracked heels dry skin 6 months ), and janelle hand cracking  (6 months )    Subjective      Michael Bray  presents to [facility]  History of Present Illness  Patient is here for follow-up on diabetes and multiple other chronic issues.  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  He continues to have a lot of flaking in peeling of the palms of his hands, but also says over the last few months he has developed similar issues with his soles of his feet.  The patient soaping the hands in his last appointment 6 months ago but denied any foot problems, so he was given ketoconazole to treat empirically for possible tinea Buck, and advised to let me know in 2 to 3 weeks if the problem did not clear up.  The patient says the problem did not improve at all, but he did not bother to call me.  The eruption on the feet started a few months ago when he began working again, and he is on his feet more and perspires more.  The rash is not pruritic.  He says the psoriasis on the other aspects of his body has remained pretty silent, although he had trouble keeping his last appointment with rheumatology due to a move in their office, and has decided that he wants to switch to a rheumatologist in [place], since he and his daughter both get a lot of other medical care there.  He has been on Humira for the last several years for psoriatic arthritis.    Other than those issues, the patient says he has been stable overall and his chronic problems have been well-controlled.  He has not been for his diabetic eye exam in a couple of years, and agreed to let us call and schedule an appointment for him.  Objective   Vital Signs:   Vitals:    01/13/25 0928   BP: 128/80   BP Location: Left arm  "  Patient Position: Sitting   Cuff Size: Adult   Pulse: 65   SpO2: 97%   Weight: 113 kg (250 lb)   Height: 184.2 cm (72.5\")      /80 (BP Location: Left arm, Patient Position: Sitting, Cuff Size: Adult)   Pulse 65   Ht 184.2 cm (72.5\")   Wt 113 kg (250 lb)   SpO2 97%   BMI 33.44 kg/m²     Body mass index is 33.44 kg/m².    Review of Systems    Past History:  Medical History: has a past medical history of Abnormal liver function, Arthritis, Atrial fibrillation, Benign essential hypertension, Bladder cancer, Caffeine use, Chest pain (2015), Colon polyps (2018), COPD (chronic obstructive pulmonary disease), Coronary arteriosclerosis in native artery, Diabetes mellitus, Disease of thyroid gland, Gout, High risk medication use, Hyperlipidemia, Hypertension, Hypothyroidism (acquired), Lung nodule, Neuropathy, Nicotine dependence, Obesity, Perforated gastric ulcer (2015), Psoriasis, Psoriatic arthritis, Rotator cuff injury, Rotator cuff tear, Septic shock (2015), Shoulder problem, Sleep apnea with use of continuous positive airway pressure (CPAP), Thyroid disease, Umbilical hernia, and Wears reading eyeglasses.   Surgical History: has a past surgical history that includes Rotator cuff repair (Left, 01/2014); Stomach surgery (2015); Cardiac catheterization (N/A, 08/06/2019); Reverse total shoulder arthroplasty (Bilateral); Colonoscopy (2018); Tracheostomy tube placement (2015); Cystoscopy; Coronary artery bypass graft (N/A, 08/21/2019); Umbilical hernia repair; Bronchoscopy (2019); Bladder surgery (03/2019); Colonoscopy w/ polypectomy (03/2018); Shoulder surgery; Hernia repair; and Cholecystectomy.   Family History: family history includes Arthritis in his daughter; Atrial fibrillation in his mother; COPD in his mother; Cirrhosis in his father; Depression in his sister; Diabetes in his sister; Gout in his father; Heart attack in his father; Heart valve disorder in his mother; Hyperlipidemia in his sister; " Hypertension in his father, mother, and sister; No Known Problems in his maternal grandfather, paternal grandfather, and paternal grandmother; Obesity in his sister; Prostate cancer in his father; Stroke in his maternal grandmother; Thyroid disease in his mother; cardiovascular disease in his father and mother; leaking heart in his mother.   Social History: reports that he has been smoking cigarettes. He started smoking about 53 years ago. He has a 53 pack-year smoking history. He has never used smokeless tobacco. He reports that he does not drink alcohol and does not use drugs.      Current Outpatient Medications:     Adalimumab (Humira, 2 Pen,) 40 MG/0.4ML Pen-injector Kit, Inject 40 mg under the skin into the appropriate area as directed Every 14 (Fourteen) Days., Disp: 6 each, Rfl: 3    albuterol sulfate  (90 Base) MCG/ACT inhaler, Inhale 2 puffs Every 4 (Four) Hours As Needed for Wheezing., Disp: 54 g, Rfl: 3    amiodarone (PACERONE) 200 MG tablet, Take 1 tablet by mouth Daily., Disp: 30 tablet, Rfl: 3    aspirin 81 MG EC tablet, Take 1 tablet by mouth Daily., Disp: 90 tablet, Rfl: 3    atorvastatin (LIPITOR) 40 MG tablet, Take 1 tablet by mouth Daily., Disp: 90 tablet, Rfl: 3    Cholecalciferol (VITAMIN D3) 5000 units capsule capsule, Take 1 capsule by mouth Daily., Disp: , Rfl:     gabapentin (NEURONTIN) 800 MG tablet, Take 1 tablet by mouth 3 (Three) Times a Day., Disp: , Rfl:     gemfibrozil (LOPID) 600 MG tablet, Take 1 tablet by mouth 2 (Two) Times a Day Before Meals., Disp: 180 tablet, Rfl: 3    HYDROcodone-acetaminophen (NORCO) 5-325 MG per tablet, Take 1 tablet by mouth 2 (Two) Times a Day As Needed., Disp: , Rfl: 0    levothyroxine (Synthroid) 125 MCG tablet, Take 1 tablet by mouth Daily., Disp: 90 tablet, Rfl: 2    metFORMIN (GLUCOPHAGE) 1000 MG tablet, Take 1 tablet by mouth 2 (Two) Times a Day With Meals., Disp: 180 tablet, Rfl: 3    metoprolol tartrate (LOPRESSOR) 25 MG tablet, Take 0.5  tablets by mouth Every 12 (Twelve) Hours., Disp: 90 tablet, Rfl: 3    omeprazole (priLOSEC) 20 MG capsule, Take 1 capsule by mouth 2 (Two) Times a Day., Disp: 180 capsule, Rfl: 3    Semaglutide, 1 MG/DOSE, (Ozempic, 1 MG/DOSE,) 2 MG/1.5ML solution pen-injector, Inject 1 mg under the skin into the appropriate area as directed 1 (One) Time Per Week., Disp: 9 mL, Rfl: 3    allopurinol (ZYLOPRIM) 300 MG tablet, TAKE 1 TABLET BY MOUTH EVERY DAY, Disp: 90 tablet, Rfl: 3    clobetasol propionate (TEMOVATE) 0.05 % cream, Apply 1 Application topically to the appropriate area as directed 2 (Two) Times a Day., Disp: 60 g, Rfl: 5    glipizide (GLUCOTROL) 10 MG tablet, TAKE 1 TABLET BY MOUTH TWICE DAILY BEFORE MEALS, Disp: 180 tablet, Rfl: 3    levothyroxine (SYNTHROID, LEVOTHROID) 150 MCG tablet, TAKE 1 TABLET BY MOUTH EVERY DAY, Disp: 90 tablet, Rfl: 1  No current facility-administered medications for this visit.    Facility-Administered Medications Ordered in Other Visits:     Chlorhexidine Gluconate Cloth 2 % pads 1 application, 1 application , Topical, Q12H PRN, Radha Goodson PA-C    Allergies: Patient has no known allergies.    Physical Exam  Constitutional:       General: He is not in acute distress.     Appearance: He is obese. He is not toxic-appearing.   HENT:      Head: Normocephalic and atraumatic.      Right Ear: Ear canal and external ear normal.      Left Ear: Ear canal and external ear normal.      Nose: Nose normal.      Mouth/Throat:      Mouth: Mucous membranes are moist.      Pharynx: Oropharynx is clear.   Eyes:      General: No scleral icterus.     Extraocular Movements: Extraocular movements intact.      Conjunctiva/sclera: Conjunctivae normal.      Pupils: Pupils are equal, round, and reactive to light.   Neck:      Vascular: No carotid bruit.   Cardiovascular:      Rate and Rhythm: Normal rate and regular rhythm.      Pulses: Normal pulses.      Heart sounds: Normal heart sounds. No murmur heard.      No gallop.   Pulmonary:      Effort: Pulmonary effort is normal.      Breath sounds: Normal breath sounds. No wheezing or rales.   Chest:      Chest wall: No tenderness.   Abdominal:      General: Bowel sounds are normal. There is no distension.      Palpations: Abdomen is soft. There is no mass.      Tenderness: There is no abdominal tenderness. There is no guarding or rebound.   Musculoskeletal:         General: No swelling or deformity. Normal range of motion.      Cervical back: Normal range of motion. No rigidity.      Right lower leg: No edema.      Left lower leg: No edema.   Lymphadenopathy:      Cervical: No cervical adenopathy.   Skin:     General: Skin is warm and dry.      Capillary Refill: Capillary refill takes less than 2 seconds.      Coloration: Skin is not jaundiced.      Findings: Rash (Flaking and peeling on the palms and soles) present. No erythema.   Neurological:      General: No focal deficit present.      Mental Status: He is alert and oriented to person, place, and time.      Cranial Nerves: No cranial nerve deficit.      Motor: No weakness.      Coordination: Coordination normal.      Gait: Gait normal.   Psychiatric:         Mood and Affect: Mood normal.         Behavior: Behavior normal.         Thought Content: Thought content normal.         Judgment: Judgment normal.                   Assessment and Plan   Diagnoses and all orders for this visit:    1. Type 2 diabetes mellitus with peripheral neuropathy (Primary)  -     Hemoglobin A1c  Diabetes has been well-controlled with Ozempic 1 mg weekly, metformin 1000 mg twice a day, and glipizide 10 mg twice a day and he will continue, and we will check labs  2. Immunization due  -     Fluzone >6mos    3. Atherosclerosis of native coronary artery of native heart without angina pectoris  Comanaged with cardiology and currently asymptomatic  4. Hypothyroidism (acquired)  -     TSH+Free T4  Patient was not sure if he is on levothyroxine 125 mcg  or 150 mcg daily, so I will have my medical assistant contact the pharmacy to clarify and then update his chart  5. Sleep apnea with use of continuous positive airway pressure (CPAP)  Patient is using his CPAP and benefits from this  6. Mixed hyperlipidemia  -     Lipid Panel    7. S/P CABG x 4 on 8/21/2019    8. Chronic obstructive pulmonary disease, unspecified COPD type  In spite of the fact that the patient continues to smoke, he has very minimal symptoms from this and only occasionally needs albuterol inhaler.  The importance of smoking cessation has been discussed repeatedly  9. Paroxysmal atrial fibrillation  Comanaged with cardiology and well-controlled on current medications including metoprolol 12.5 mg twice a day and amiodarone 200 mg daily, although his anticoagulation is limited to aspirin 81 mg daily due to history of bleeding problems  10. Psoriatic arthritis  -     Ambulatory Referral to Rheumatology    11. Idiopathic chronic gout of multiple sites without tophus  -     Uric Acid    12. Gastroesophageal reflux disease without esophagitis    13. Essential hypertension, benign    14. Long-term use of high-risk medication  -     CBC & Differential  -     Comprehensive Metabolic Panel  -     Magnesium    15. Vitamin D deficiency  -     Vitamin D,25-Hydroxy    16. Psoriasis  Patient will be given clobetasol propionate cream and advised to use for no longer than 2 weeks on the palms and soles, and that he must take a break for 6 to 8 weeks, although he may repeat that cycle indefinitely providing that the treatment helps.  If this is ineffective any we will likely need to see a dermatologist, although the problem is likely to get better when he resumes his Humira  17. Tobacco use disorder  -     CT Chest Low Dose Wo; Future    18. Cigarette nicotine dependence without complication  -     CT Chest Low Dose Wo; Future    Other orders  -     clobetasol propionate (TEMOVATE) 0.05 % cream; Apply 1 Application  topically to the appropriate area as directed 2 (Two) Times a Day.  Dispense: 60 g; Refill: 5  -     Discontinue: Semaglutide, 1 MG/DOSE, (Ozempic, 1 MG/DOSE,) 2 MG/1.5ML solution pen-injector; Inject 1 mg under the skin into the appropriate area as directed 1 (One) Time Per Week.  Dispense: 9 mL; Refill: 3  -     Semaglutide, 1 MG/DOSE, (Ozempic, 1 MG/DOSE,) 2 MG/1.5ML solution pen-injector; Inject 1 mg under the skin into the appropriate area as directed 1 (One) Time Per Week.  Dispense: 9 mL; Refill: 3            Follow Up   Return in about 6 months (around 7/13/2025) for Annual physical, Nurse - AWV Subsequent.  Patient was given instructions and counseling regarding his condition or for health maintenance advice. Please see specific information pulled into the AVS if appropriate.     Berny Dangelo MD   83.9

## 2025-02-27 RX ORDER — POLYETHYLENE GLYCOL 3350 AND ELECTROLYTES WITH LEMON FLAVOR 236; 22.74; 6.74; 5.86; 2.97 G/4L; G/4L; G/4L; G/4L; G/4L
236 POWDER, FOR SOLUTION ORAL
Qty: 1 | Refills: 0 | Status: ACTIVE | COMMUNITY
Start: 2025-02-27 | End: 1900-01-01

## 2025-04-01 ENCOUNTER — NON-APPOINTMENT (OUTPATIENT)
Age: 60
End: 2025-04-01

## 2025-04-02 ENCOUNTER — TRANSCRIPTION ENCOUNTER (OUTPATIENT)
Age: 60
End: 2025-04-02

## 2025-04-02 ENCOUNTER — OUTPATIENT (OUTPATIENT)
Dept: OUTPATIENT SERVICES | Facility: HOSPITAL | Age: 60
LOS: 1 days | Discharge: HOME | End: 2025-04-02
Payer: MEDICARE

## 2025-04-02 ENCOUNTER — APPOINTMENT (OUTPATIENT)
Dept: GASTROENTEROLOGY | Facility: HOSPITAL | Age: 60
End: 2025-04-02

## 2025-04-02 VITALS
HEART RATE: 71 BPM | OXYGEN SATURATION: 100 % | SYSTOLIC BLOOD PRESSURE: 116 MMHG | RESPIRATION RATE: 14 BRPM | DIASTOLIC BLOOD PRESSURE: 72 MMHG

## 2025-04-02 VITALS
HEART RATE: 64 BPM | SYSTOLIC BLOOD PRESSURE: 132 MMHG | RESPIRATION RATE: 21 BRPM | DIASTOLIC BLOOD PRESSURE: 76 MMHG | OXYGEN SATURATION: 100 % | WEIGHT: 186.95 LBS | TEMPERATURE: 98 F

## 2025-04-02 DIAGNOSIS — Z90.89 ACQUIRED ABSENCE OF OTHER ORGANS: Chronic | ICD-10-CM

## 2025-04-02 DIAGNOSIS — Z98.890 OTHER SPECIFIED POSTPROCEDURAL STATES: Chronic | ICD-10-CM

## 2025-04-02 DIAGNOSIS — Z12.11 ENCOUNTER FOR SCREENING FOR MALIGNANT NEOPLASM OF COLON: ICD-10-CM

## 2025-04-02 DIAGNOSIS — Z96.89 PRESENCE OF OTHER SPECIFIED FUNCTIONAL IMPLANTS: Chronic | ICD-10-CM

## 2025-04-02 DIAGNOSIS — Z87.19 PERSONAL HISTORY OF OTHER DISEASES OF THE DIGESTIVE SYSTEM: Chronic | ICD-10-CM

## 2025-04-02 PROCEDURE — 45385 COLONOSCOPY W/LESION REMOVAL: CPT

## 2025-04-02 DEVICE — CLIP RESOLUTION 360 235CM: Type: IMPLANTABLE DEVICE | Status: FUNCTIONAL

## 2025-04-02 RX ORDER — PEG-3350, SODIUM SULFATE, SODIUM CHLORIDE, POTASSIUM CHLORIDE, SODIUM ASCORBATE AND ASCORBIC ACID 7.5-2.691G
100 KIT ORAL
Qty: 1 | Refills: 0 | Status: ACTIVE | COMMUNITY
Start: 2025-04-02 | End: 1900-01-01

## 2025-04-02 RX ORDER — SODIUM CHLORIDE 9 G/1000ML
500 INJECTION, SOLUTION INTRAVENOUS
Refills: 0 | Status: ACTIVE | OUTPATIENT
Start: 2025-04-02

## 2025-04-02 NOTE — ASU DISCHARGE PLAN (ADULT/PEDIATRIC) - NS MD DC FALL RISK RISK
For information on Fall & Injury Prevention, visit: [email or web address] OR  [email or web address] OR  https://www.cdc.gov/steadi/patient.html

## 2025-04-02 NOTE — PRE PROCEDURE NOTE - PRE PROCEDURE EVALUATION
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      Low testosterone in male      Osteoarthritis      Intervertebral disc disorder  bulging discs cervical and lumbar region      Shoulder pain, left      Depression      PE (pulmonary thromboembolism)      Insomnia      Gastroparesis      COVID-19 virus infection  h/o 4/2020      COVID-19 vaccine series completed      H/O submucous nasal surgery  septoplasty      S/P laparoscopic fundoplication  2x      S/P Achilles tendon repair  right - done 2x      S/P carpal tunnel release  right      S/P tonsillectomy and adenoidectomy      H/O hiatal hernia  repaired      Presence of gastric pacemaker  2018          Latex allergy: [ ] yes [X] no    Smoking: [ ] yes  [X] no    AICD/PPM: [ ] yes   [X] no    Pertinent lab data:                      Physical Examination:  Daily     Daily   Vital Signs Last 24 Hrs  T(C): 36.1 (02 Apr 2025 16:30), Max: 36.4 (02 Apr 2025 14:03)  T(F): 97 (02 Apr 2025 16:30), Max: 97.5 (02 Apr 2025 14:03)  HR: 81 (02 Apr 2025 16:30) (64 - 81)  BP: 120/52 (02 Apr 2025 16:30) (120/52 - 132/76)  BP(mean): --  RR: 18 (02 Apr 2025 16:30) (18 - 21)  SpO2: 100% (02 Apr 2025 16:30) (100% - 100%)    Parameters below as of 02 Apr 2025 16:30  Patient On (Oxygen Delivery Method): room air        Constitutional: NAD    HEENT: PERRLA, EOMI,       Neck:  No JVD    Respiratory: CTAB/L    Cardiovascular: S1 and S2    Gastrointestinal: BS+, soft, NT/ND    Extremities: No peripheral edema    Neurological: A/O x 3, no focal deficits    Psychiatric: Normal mood, normal affect    : No Jiménez    Skin: No rashes    Comments:    ASA Class: I [ ]  II [ ]  III [X ]  IV [ ]    The patient is a suitable candidate for the planned procedure unless box checked [ ]  No, explain:

## 2025-04-02 NOTE — ASU DISCHARGE PLAN (ADULT/PEDIATRIC) - FINANCIAL ASSISTANCE
[facility] provides services at a reduced cost to those who are determined to be eligible through [facility]’s financial assistance program. Information regarding [facility]’s financial assistance program can be found by going to [email or web address] or by calling [phone].

## 2025-04-03 ENCOUNTER — RESULT REVIEW (OUTPATIENT)
Age: 60
End: 2025-04-03

## 2025-04-03 PROCEDURE — 88305 TISSUE EXAM BY PATHOLOGIST: CPT | Mod: 26

## 2025-04-08 LAB — SURGICAL PATHOLOGY STUDY: SIGNIFICANT CHANGE UP

## 2025-06-30 ENCOUNTER — RX RENEWAL (OUTPATIENT)
Age: 60
End: 2025-06-30

## 2025-07-09 ENCOUNTER — RX RENEWAL (OUTPATIENT)
Age: 60
End: 2025-07-09

## 2025-07-09 RX ORDER — PRUCALOPRIDE 2 MG/1
2 TABLET, FILM COATED ORAL
Qty: 90 | Refills: 3 | Status: ACTIVE | COMMUNITY
Start: 2025-07-09 | End: 1900-01-01

## 2025-08-06 ENCOUNTER — NON-APPOINTMENT (OUTPATIENT)
Age: 60
End: 2025-08-06

## 2025-08-06 DIAGNOSIS — D50.9 IRON DEFICIENCY ANEMIA, UNSPECIFIED: ICD-10-CM

## 2025-08-27 ENCOUNTER — EMERGENCY (EMERGENCY)
Facility: HOSPITAL | Age: 60
LOS: 1 days | End: 2025-08-27
Attending: EMERGENCY MEDICINE | Admitting: EMERGENCY MEDICINE
Payer: COMMERCIAL

## 2025-08-27 VITALS
HEART RATE: 93 BPM | RESPIRATION RATE: 17 BRPM | TEMPERATURE: 97 F | SYSTOLIC BLOOD PRESSURE: 138 MMHG | DIASTOLIC BLOOD PRESSURE: 82 MMHG | OXYGEN SATURATION: 99 %

## 2025-08-27 VITALS
HEART RATE: 87 BPM | RESPIRATION RATE: 18 BRPM | SYSTOLIC BLOOD PRESSURE: 153 MMHG | DIASTOLIC BLOOD PRESSURE: 97 MMHG | TEMPERATURE: 98 F | OXYGEN SATURATION: 95 %

## 2025-08-27 DIAGNOSIS — Z98.890 OTHER SPECIFIED POSTPROCEDURAL STATES: Chronic | ICD-10-CM

## 2025-08-27 DIAGNOSIS — Z90.89 ACQUIRED ABSENCE OF OTHER ORGANS: Chronic | ICD-10-CM

## 2025-08-27 DIAGNOSIS — Z96.89 PRESENCE OF OTHER SPECIFIED FUNCTIONAL IMPLANTS: Chronic | ICD-10-CM

## 2025-08-27 DIAGNOSIS — Z87.19 PERSONAL HISTORY OF OTHER DISEASES OF THE DIGESTIVE SYSTEM: Chronic | ICD-10-CM

## 2025-08-27 LAB
ALBUMIN SERPL ELPH-MCNC: 4.3 G/DL — SIGNIFICANT CHANGE UP (ref 3.3–5)
ALP SERPL-CCNC: 77 U/L — SIGNIFICANT CHANGE UP (ref 40–120)
ALT FLD-CCNC: 32 U/L — SIGNIFICANT CHANGE UP (ref 4–41)
ANION GAP SERPL CALC-SCNC: 12 MMOL/L — SIGNIFICANT CHANGE UP (ref 7–14)
APTT BLD: 35.8 SEC — SIGNIFICANT CHANGE UP (ref 26.1–36.8)
AST SERPL-CCNC: 33 U/L — SIGNIFICANT CHANGE UP (ref 4–40)
BASOPHILS # BLD AUTO: 0.03 K/UL — SIGNIFICANT CHANGE UP (ref 0–0.2)
BASOPHILS NFR BLD AUTO: 0.3 % — SIGNIFICANT CHANGE UP (ref 0–2)
BILIRUB SERPL-MCNC: 0.7 MG/DL — SIGNIFICANT CHANGE UP (ref 0.2–1.2)
BLOOD GAS VENOUS COMPREHENSIVE RESULT: SIGNIFICANT CHANGE UP
BUN SERPL-MCNC: 10 MG/DL — SIGNIFICANT CHANGE UP (ref 7–23)
CALCIUM SERPL-MCNC: 9.4 MG/DL — SIGNIFICANT CHANGE UP (ref 8.4–10.5)
CHLORIDE SERPL-SCNC: 100 MMOL/L — SIGNIFICANT CHANGE UP (ref 98–107)
CO2 SERPL-SCNC: 22 MMOL/L — SIGNIFICANT CHANGE UP (ref 22–31)
CREAT SERPL-MCNC: 1.03 MG/DL — SIGNIFICANT CHANGE UP (ref 0.5–1.3)
EGFR: 83 ML/MIN/1.73M2 — SIGNIFICANT CHANGE UP
EGFR: 83 ML/MIN/1.73M2 — SIGNIFICANT CHANGE UP
EOSINOPHIL # BLD AUTO: 0.11 K/UL — SIGNIFICANT CHANGE UP (ref 0–0.5)
EOSINOPHIL NFR BLD AUTO: 1 % — SIGNIFICANT CHANGE UP (ref 0–6)
GLUCOSE SERPL-MCNC: 118 MG/DL — HIGH (ref 70–99)
HCT VFR BLD CALC: 37.2 % — LOW (ref 39–50)
HGB BLD-MCNC: 12.8 G/DL — LOW (ref 13–17)
IMM GRANULOCYTES # BLD AUTO: 0.04 K/UL — SIGNIFICANT CHANGE UP (ref 0–0.07)
IMM GRANULOCYTES NFR BLD AUTO: 0.4 % — SIGNIFICANT CHANGE UP (ref 0–0.9)
INR BLD: 1.06 RATIO — SIGNIFICANT CHANGE UP (ref 0.85–1.16)
LIDOCAIN IGE QN: 17 U/L — SIGNIFICANT CHANGE UP (ref 7–60)
LYMPHOCYTES # BLD AUTO: 1.52 K/UL — SIGNIFICANT CHANGE UP (ref 1–3.3)
LYMPHOCYTES NFR BLD AUTO: 14.4 % — SIGNIFICANT CHANGE UP (ref 13–44)
MCHC RBC-ENTMCNC: 30.5 PG — SIGNIFICANT CHANGE UP (ref 27–34)
MCHC RBC-ENTMCNC: 34.4 G/DL — SIGNIFICANT CHANGE UP (ref 32–36)
MCV RBC AUTO: 88.8 FL — SIGNIFICANT CHANGE UP (ref 80–100)
MONOCYTES # BLD AUTO: 0.81 K/UL — SIGNIFICANT CHANGE UP (ref 0–0.9)
MONOCYTES NFR BLD AUTO: 7.7 % — SIGNIFICANT CHANGE UP (ref 2–14)
NEUTROPHILS # BLD AUTO: 8.04 K/UL — HIGH (ref 1.8–7.4)
NEUTROPHILS NFR BLD AUTO: 76.2 % — SIGNIFICANT CHANGE UP (ref 43–77)
NRBC # BLD AUTO: 0 K/UL — SIGNIFICANT CHANGE UP (ref 0–0)
NRBC # FLD: 0 K/UL — SIGNIFICANT CHANGE UP (ref 0–0)
NRBC BLD AUTO-RTO: 0 /100 WBCS — SIGNIFICANT CHANGE UP (ref 0–0)
PLATELET # BLD AUTO: 243 K/UL — SIGNIFICANT CHANGE UP (ref 150–400)
PMV BLD: 10.5 FL — SIGNIFICANT CHANGE UP (ref 7–13)
POTASSIUM SERPL-MCNC: 3.8 MMOL/L — SIGNIFICANT CHANGE UP (ref 3.5–5.3)
POTASSIUM SERPL-SCNC: 3.8 MMOL/L — SIGNIFICANT CHANGE UP (ref 3.5–5.3)
PROT SERPL-MCNC: 7.3 G/DL — SIGNIFICANT CHANGE UP (ref 6–8.3)
PROTHROM AB SERPL-ACNC: 12.3 SEC — SIGNIFICANT CHANGE UP (ref 9.9–13.4)
RBC # BLD: 4.19 M/UL — LOW (ref 4.2–5.8)
RBC # FLD: 14.3 % — SIGNIFICANT CHANGE UP (ref 10.3–14.5)
SODIUM SERPL-SCNC: 134 MMOL/L — LOW (ref 135–145)
TROPONIN T, HIGH SENSITIVITY RESULT: 9 NG/L — SIGNIFICANT CHANGE UP
WBC # BLD: 10.55 K/UL — HIGH (ref 3.8–10.5)
WBC # FLD AUTO: 10.55 K/UL — HIGH (ref 3.8–10.5)

## 2025-08-27 PROCEDURE — 74177 CT ABD & PELVIS W/CONTRAST: CPT | Mod: 26

## 2025-08-27 PROCEDURE — 93010 ELECTROCARDIOGRAM REPORT: CPT

## 2025-08-27 PROCEDURE — 99285 EMERGENCY DEPT VISIT HI MDM: CPT

## 2025-08-27 PROCEDURE — 71260 CT THORAX DX C+: CPT | Mod: 26

## 2025-08-27 RX ORDER — HYDROMORPHONE/SOD CHLOR,ISO/PF 2 MG/10 ML
1 SYRINGE (ML) INJECTION ONCE
Refills: 0 | Status: DISCONTINUED | OUTPATIENT
Start: 2025-08-27 | End: 2025-08-27

## 2025-08-27 RX ORDER — ONDANSETRON HCL/PF 4 MG/2 ML
4 VIAL (ML) INJECTION ONCE
Refills: 0 | Status: COMPLETED | OUTPATIENT
Start: 2025-08-27 | End: 2025-08-27

## 2025-08-27 RX ADMIN — Medication 20 MILLIGRAM(S): at 08:58

## 2025-08-27 RX ADMIN — Medication 1 MILLIGRAM(S): at 08:58

## 2025-08-27 RX ADMIN — Medication 1000 MILLILITER(S): at 08:13

## 2025-08-27 RX ADMIN — Medication 1 MILLIGRAM(S): at 08:13

## 2025-08-27 RX ADMIN — Medication 20 MILLIGRAM(S): at 08:13

## 2025-08-27 RX ADMIN — Medication 4 MILLIGRAM(S): at 08:13

## 2025-09-02 ENCOUNTER — TRANSCRIPTION ENCOUNTER (OUTPATIENT)
Age: 60
End: 2025-09-02

## 2025-09-02 ENCOUNTER — APPOINTMENT (OUTPATIENT)
Dept: GASTROENTEROLOGY | Facility: HOSPITAL | Age: 60
End: 2025-09-02

## 2025-09-02 ENCOUNTER — OUTPATIENT (OUTPATIENT)
Dept: OUTPATIENT SERVICES | Facility: HOSPITAL | Age: 60
LOS: 1 days | End: 2025-09-02
Payer: COMMERCIAL

## 2025-09-02 ENCOUNTER — RESULT REVIEW (OUTPATIENT)
Age: 60
End: 2025-09-02

## 2025-09-02 VITALS
TEMPERATURE: 97 F | RESPIRATION RATE: 19 BRPM | WEIGHT: 184.97 LBS | OXYGEN SATURATION: 98 % | DIASTOLIC BLOOD PRESSURE: 98 MMHG | SYSTOLIC BLOOD PRESSURE: 133 MMHG | HEART RATE: 63 BPM

## 2025-09-02 VITALS
OXYGEN SATURATION: 99 % | DIASTOLIC BLOOD PRESSURE: 76 MMHG | RESPIRATION RATE: 16 BRPM | SYSTOLIC BLOOD PRESSURE: 128 MMHG | HEART RATE: 66 BPM

## 2025-09-02 DIAGNOSIS — Z87.19 PERSONAL HISTORY OF OTHER DISEASES OF THE DIGESTIVE SYSTEM: Chronic | ICD-10-CM

## 2025-09-02 DIAGNOSIS — Z90.89 ACQUIRED ABSENCE OF OTHER ORGANS: Chronic | ICD-10-CM

## 2025-09-02 DIAGNOSIS — Z98.890 OTHER SPECIFIED POSTPROCEDURAL STATES: Chronic | ICD-10-CM

## 2025-09-02 DIAGNOSIS — Z96.89 PRESENCE OF OTHER SPECIFIED FUNCTIONAL IMPLANTS: Chronic | ICD-10-CM

## 2025-09-02 DIAGNOSIS — D50.9 IRON DEFICIENCY ANEMIA, UNSPECIFIED: ICD-10-CM

## 2025-09-02 PROCEDURE — 88305 TISSUE EXAM BY PATHOLOGIST: CPT | Mod: 26

## 2025-09-02 PROCEDURE — 44377 SMALL BOWEL ENDOSCOPY/BIOPSY: CPT

## 2025-09-02 DEVICE — CATH ENDOFLIP MEASURE 16MM: Type: IMPLANTABLE DEVICE | Status: FUNCTIONAL

## 2025-09-02 RX ORDER — SODIUM CHLORIDE 9 G/1000ML
500 INJECTION, SOLUTION INTRAVENOUS
Refills: 0 | Status: DISCONTINUED | OUTPATIENT
Start: 2025-09-02 | End: 2025-09-16

## 2025-09-04 LAB — SURGICAL PATHOLOGY STUDY: SIGNIFICANT CHANGE UP

## 2025-09-04 RX ORDER — FLUCONAZOLE 40 MG/ML
40 POWDER, FOR SUSPENSION ORAL DAILY
Qty: 3 | Refills: 0 | Status: ACTIVE | COMMUNITY
Start: 2025-09-04 | End: 1900-01-01

## 2025-09-12 DIAGNOSIS — K57.32 DIVERTICULITIS OF LARGE INTESTINE W/OUT PERFORATION OR ABSCESS W/OUT BLEEDING: ICD-10-CM

## 2025-09-12 DIAGNOSIS — R10.30 LOWER ABDOMINAL PAIN, UNSPECIFIED: ICD-10-CM

## 2025-09-15 RX ORDER — CIPROFLOXACIN HYDROCHLORIDE 500 MG/1
500 TABLET, FILM COATED ORAL
Qty: 14 | Refills: 0 | Status: DISCONTINUED | COMMUNITY
Start: 2025-09-12 | End: 2025-09-15

## 2025-09-15 RX ORDER — METRONIDAZOLE 500 MG/1
500 TABLET ORAL
Qty: 21 | Refills: 0 | Status: DISCONTINUED | COMMUNITY
Start: 2025-09-12 | End: 2025-09-15

## 2025-09-16 ENCOUNTER — NON-APPOINTMENT (OUTPATIENT)
Age: 60
End: 2025-09-16

## 2025-09-17 ENCOUNTER — APPOINTMENT (OUTPATIENT)
Dept: GASTROENTEROLOGY | Facility: CLINIC | Age: 60
End: 2025-09-17
Payer: COMMERCIAL

## 2025-09-17 DIAGNOSIS — K59.09 OTHER CONSTIPATION: ICD-10-CM

## 2025-09-17 DIAGNOSIS — D50.9 IRON DEFICIENCY ANEMIA, UNSPECIFIED: ICD-10-CM

## 2025-09-17 DIAGNOSIS — R10.84 GENERALIZED ABDOMINAL PAIN: ICD-10-CM

## 2025-09-17 PROCEDURE — 99214 OFFICE O/P EST MOD 30 MIN: CPT | Mod: 95

## 2025-09-20 ENCOUNTER — APPOINTMENT (OUTPATIENT)
Dept: CT IMAGING | Facility: CLINIC | Age: 60
End: 2025-09-20

## (undated) DEVICE — BIOPSY FORCEP RADIAL JAW 4 STANDARD WITH NEEDLE

## (undated) DEVICE — PACK IV START WITH CHG

## (undated) DEVICE — VENODYNE/SCD SLEEVE CALF MEDIUM

## (undated) DEVICE — CONTAINER FORMALIN 10% 20ML

## (undated) DEVICE — SYR LUER LOK 20CC

## (undated) DEVICE — DRSG CURITY GAUZE SPONGE 4 X 4" 12-PLY NON-STERILE

## (undated) DEVICE — FACESHIELD FULL VISOR

## (undated) DEVICE — ELCTR ECG CONDUCTIVE ADHESIVE

## (undated) DEVICE — TUBING STRYKER PNEUMOCLEAR HIGH FLOW

## (undated) DEVICE — DENTURE CUP PINK

## (undated) DEVICE — GOWN LG

## (undated) DEVICE — BITE BLOCK ADULT 20 X 27MM (GREEN)

## (undated) DEVICE — SALIVA EJECTOR (BLUE)

## (undated) DEVICE — DRSG 2X2

## (undated) DEVICE — BASIN EMESIS 10IN GRADUATED MAUVE

## (undated) DEVICE — LINE BREATHE SAMPLNG

## (undated) DEVICE — DRAPE C ARM UNIVERSAL

## (undated) DEVICE — POSITIONER STRAP ARMBOARD VELCRO TS-30

## (undated) DEVICE — CATH IV SAFE BC 22G X 1" (BLUE)

## (undated) DEVICE — TUBING IV SET GRAVITY 3Y 100" MACRO

## (undated) DEVICE — DRAPE TOWEL BLUE 17" X 24"

## (undated) DEVICE — LUBRICATING JELLY HR ONE SHOT 3G

## (undated) DEVICE — GUN DIL ALLIANCE

## (undated) DEVICE — CLAMP BX HOT RAD JAW 3

## (undated) DEVICE — WARMING BLANKET LOWER ADULT

## (undated) DEVICE — BIOPSY FORCEP COLD DISP

## (undated) DEVICE — GLV 7 PROTEXIS (WHITE)

## (undated) DEVICE — UNDERPAD LINEN SAVER 17 X 24"

## (undated) DEVICE — TUBING MEDI-VAC W MAXIGRIP CONNECTORS 1/4"X6'

## (undated) DEVICE — Device

## (undated) DEVICE — ENDOCATCH 10MM SPECIMEN POUCH

## (undated) DEVICE — POLY TRAP ETRAP

## (undated) DEVICE — CONTAINER FORMALIN 80ML YELLOW

## (undated) DEVICE — SYR ALLIANCE II INFLATION 60ML

## (undated) DEVICE — ELCTR BOVIE PENCIL SMOKE EVACUATION

## (undated) DEVICE — SNARE LESIONHUNTER ROTAT NITNL COLD 10MM

## (undated) DEVICE — DRSG BANDAID 0.75X3"

## (undated) DEVICE — SHEARS COVIDIEN ENDO SHEAR 5MM X 31CM W UNIPOLAR CAUTERY

## (undated) DEVICE — TROCAR COVIDIEN VERSAPORT BLADELESS OPTICAL 11MM STANDARD

## (undated) DEVICE — SUCTION YANKAUER NO CONTROL VENT

## (undated) DEVICE — FORCEP RADIAL JAW 4 JUMBO 2.8MM 3.2MM 240CM ORANGE DISP

## (undated) DEVICE — STERIS DEFENDO 3-PIECE KIT (AIR/WATER, SUCTION & BIOPSY VALVES)

## (undated) DEVICE — SUT POLYSORB 0 30" GU-46

## (undated) DEVICE — CHEST DRAIN OASIS DRY SUCTION WATER SEAL

## (undated) DEVICE — SOL IRR POUR H2O 1000ML

## (undated) DEVICE — ENDOCUFF VISION SZ 2 LG GRN

## (undated) DEVICE — ELCTR GROUNDING PAD ADULT COVIDIEN

## (undated) DEVICE — PACK GENERAL LAPAROSCOPY

## (undated) DEVICE — SYR LUER LOK 5CC

## (undated) DEVICE — TROCAR COVIDIEN VERSAONE BLUNT TIP HASSAN 12MM

## (undated) DEVICE — SOL IRR BAG NS 0.9% 3000ML

## (undated) DEVICE — NDL HYPO SAFE 25G X 1.5" (ORANGE)

## (undated) DEVICE — ELCTR BOVIE TIP BLADE INSULATED 2.75" EDGE

## (undated) DEVICE — SYR LUER LOK 10CC

## (undated) DEVICE — SOL IRR POUR NS 0.9% 1000ML

## (undated) DEVICE — TROCAR COVIDIEN VERSAONE FIXATION CANNULA 5MM

## (undated) DEVICE — DRAPE 3/4 SHEET 52X76"

## (undated) DEVICE — SUT SURGIPRO 2-0 30" GS-22

## (undated) DEVICE — PLV/PSP-SUCTION IRRIGATOR STRYKER: Type: DURABLE MEDICAL EQUIPMENT

## (undated) DEVICE — SYR LUER LOK 50CC

## (undated) DEVICE — FOLEY HOLDER STATLOCK 2 WAY ADULT

## (undated) DEVICE — LUBRICATING JELLY ONESHOT 1.25OZ

## (undated) DEVICE — TROCAR COVIDIEN VERSAPORT BLADELESS OPTICAL 5MM STANDARD

## (undated) DEVICE — PLV/PSP-ESU FORCEFX F8I7418A: Type: DURABLE MEDICAL EQUIPMENT

## (undated) DEVICE — D HELP - CLEARVIEW CLEARIFY SYSTEM

## (undated) DEVICE — MARKING PEN W RULER

## (undated) DEVICE — DRSG DERMABOND 0.7ML

## (undated) DEVICE — TUBING SUCTION NONCONDUCTIVE 6MM X 12FT

## (undated) DEVICE — SPECIMEN CONTAINER 100ML

## (undated) DEVICE — BLADE SCALPEL SAFETYLOCK #15

## (undated) DEVICE — SUT SOFSILK 0 30" V-20

## (undated) DEVICE — GOWN SMARTGOWN RAGLAN XLG

## (undated) DEVICE — DRAPE CAMERA ENDOMATE

## (undated) DEVICE — PLV-SCD MACHINE: Type: DURABLE MEDICAL EQUIPMENT

## (undated) DEVICE — VENODYNE/SCD SLEEVE CALF LARGE

## (undated) DEVICE — DRAPE 3/4 SHEET W REINFORCEMENT 56X77"

## (undated) DEVICE — SUT SOFSILK 2-0 30" V-20

## (undated) DEVICE — TUBING SUCTION CONN 6FT STERILE

## (undated) DEVICE — SUT MONOCRYL 3-0 18" PS-2 UNDYED